# Patient Record
Sex: FEMALE | Race: WHITE | NOT HISPANIC OR LATINO | Employment: FULL TIME | URBAN - METROPOLITAN AREA
[De-identification: names, ages, dates, MRNs, and addresses within clinical notes are randomized per-mention and may not be internally consistent; named-entity substitution may affect disease eponyms.]

---

## 2017-05-12 ENCOUNTER — TRANSFERRED RECORDS (OUTPATIENT)
Dept: HEALTH INFORMATION MANAGEMENT | Facility: CLINIC | Age: 62
End: 2017-05-12

## 2017-05-30 ENCOUNTER — TRANSFERRED RECORDS (OUTPATIENT)
Dept: HEALTH INFORMATION MANAGEMENT | Facility: CLINIC | Age: 62
End: 2017-05-30

## 2017-11-20 NOTE — TELEPHONE ENCOUNTER
APPT INFO    Date /Time:  12/15/17 1:30PM   Reason for Appt: Migraines   Ref Provider/Clinic: None listed    Are there internal records? If yes, list: none   Patient Contact (Y/N) & Call Details: No- referral received   Action: Faxed request      OUTSIDE RECORDS CHECKLIST     CLINIC NAME COMMENTS REC (x) IMG (x)   [facility] Fax# [phone]

## 2017-12-14 NOTE — TELEPHONE ENCOUNTER
Phone Call:    Who did you talk to? (or) Who did you call? [facility]    Call Detail/Action: [facility] medical records directs me to their copy service, they do not have my fax.      ACTION    What did you do? Re-faxed request      Phone Call:    Who did you talk to? (or) Who did you call? [facility] [place]    Call Detail/Action: She will fax over records ASAP

## 2017-12-15 ENCOUNTER — PRE VISIT (OUTPATIENT)
Dept: NEUROLOGY | Facility: CLINIC | Age: 62
End: 2017-12-15

## 2017-12-15 ENCOUNTER — OFFICE VISIT (OUTPATIENT)
Dept: NEUROLOGY | Facility: CLINIC | Age: 62
End: 2017-12-15
Payer: COMMERCIAL

## 2017-12-15 VITALS
SYSTOLIC BLOOD PRESSURE: 146 MMHG | HEIGHT: 64 IN | HEART RATE: 74 BPM | WEIGHT: 236.9 LBS | BODY MASS INDEX: 40.45 KG/M2 | DIASTOLIC BLOOD PRESSURE: 72 MMHG

## 2017-12-15 DIAGNOSIS — G43.109 MIGRAINE WITH AURA AND WITHOUT STATUS MIGRAINOSUS, NOT INTRACTABLE: Primary | ICD-10-CM

## 2017-12-15 RX ORDER — AMOXICILLIN 500 MG
2400 CAPSULE ORAL DAILY
COMMUNITY

## 2017-12-15 RX ORDER — PROPRANOLOL HCL 60 MG
60 CAPSULE, EXTENDED RELEASE 24HR ORAL DAILY
Qty: 30 CAPSULE | Refills: 1 | Status: SHIPPED | OUTPATIENT
Start: 2017-12-15 | End: 2018-02-12

## 2017-12-15 RX ORDER — BUTALBITAL, ASPIRIN, AND CAFFEINE 325; 50; 40 MG/1; MG/1; MG/1
1 CAPSULE ORAL EVERY 4 HOURS PRN
COMMUNITY
End: 2018-04-06

## 2017-12-15 ASSESSMENT — PAIN SCALES - GENERAL: PAINLEVEL: MODERATE PAIN (5)

## 2017-12-15 NOTE — MR AVS SNAPSHOT
After Visit Summary   12/15/2017    Nasreen Galarza    MRN: [id number]           Patient Information     Date Of Birth          1955        Visit Information        Provider Department      12/15/2017 1:30 PM Ubaldo Combs MD [facility]        Today's Diagnoses     Migraine with aura and without status migrainosus, not intractable    -  1      Care Instructions    Try 2 naproxen (Aleve) instead of Fiorinal          Follow-ups after your visit        Follow-up notes from your care team     Discussed this visit Return in about 7 weeks (around 2/2/2018).      Your next 10 appointments already scheduled     Feb 09, 2018 10:00 AM CST   (Arrive by 9:45 AM)   Return Visit with Ubaldo Combs MD   [facility] ([facility])    [address] [place] [id number] [phone]              Who to contact     Please call your clinic at [phone] to:    Ask questions about your health    Make or cancel appointments    Discuss your medicines    Learn about your test results    Speak to your doctor   If you have compliments or concerns about an experience at your clinic, or if you wish to file a complaint, please contact [facility] Patient Relations at [phone] or email us at [email or web address]         Additional Information About Your Visit        MyChart Information     [employer] is an electronic gateway that provides easy, online access to your medical records. With [employer], you can request a clinic appointment, read your test results, renew a prescription or communicate with your care team.     To sign up for [employer] visit the website at [email or web address]   You will be asked to enter the access code listed below, as well as some personal information. Please follow the directions to create your username and password.     Your access code is: [email or web address]  Expires: 3/1/2018  6:30 AM     Your  "access code will  in 90 days. If you need help or a new code, please contact your [facility] or call [phone] for assistance.        Care EveryWhere ID     This is your Care EveryWhere ID. This could be used by other organizations to access your [place] medical records  [id number]        Your Vitals Were     Pulse Height BMI (Body Mass Index)             74 1.626 m (5' 4\") 40.66 kg/m2          Blood Pressure from Last 3 Encounters:   12/15/17 146/72    Weight from Last 3 Encounters:   12/15/17 107.5 kg (236 lb 14.4 oz)              Today, you had the following     No orders found for display         Today's Medication Changes          These changes are accurate as of: 12/15/17  1:35 PM.  If you have any questions, ask your nurse or doctor.               Start taking these medicines.        Dose/Directions    propranolol 60 MG 24 hr capsule   Commonly known as:  INDERAL LA   Used for:  Migraine with aura and without status migrainosus, not intractable   Started by:  Ubaldo Combs MD        Dose:  60 mg   Take 1 capsule (60 mg) by mouth daily   Quantity:  30 capsule   Refills:  1            Where to get your medicines      These medications were sent to [facility]pharm[facility] #[id number] - [place], [place] - [address]  [address], [place] [address]     Phone:  [phone]     propranolol 60 MG 24 hr capsule                Primary Care Provider    None Specified       No primary provider on file.        Equal Access to Services     [facility]UDIA [facility] : Dary Montilla, waaxda luqadaha, qaybta kaalmada selena snyder . So [facility] [phone].    ATENCIÓN: Si habla español, tiene a abad disposición servicios gratuitos de asistencia lingüística. Llame al [phone].    We comply with applicable federal civil rights laws and [place] laws. We do not discriminate on the basis of race, color, national origin, age, disability, sex, " sexual orientation, or gender identity.            Thank you!     Thank you for choosing [facility]UR[facility]  for your care. Our goal is always to provide you with excellent care. Hearing back from our patients is one way we can continue to improve our services. Please take a few minutes to complete the written survey that you may receive in the mail after your visit with us. Thank you!             Your Updated Medication List - Protect others around you: Learn how to safely use, store and throw away your medicines at www.disposemymeds.org.          This list is accurate as of: 12/15/17  1:35 PM.  Always use your most recent med list.                   Brand Name Dispense Instructions for use Diagnosis    ATORVASTATIN CALCIUM PO      Take 80 mg by mouth daily        FIORINAL capsule   Generic drug:  butalbital-aspirin-caffeine      Take 1 capsule by mouth every 4 hours as needed for headaches        fish Oil 1200 MG capsule      Take 2,400 mg by mouth daily        magnesium 500 MG Tabs      Take 500 mg by mouth daily        MULTI COMPLETE PO           PAROXETINE HCL PO      Take 10 mg by mouth daily        propranolol 60 MG 24 hr capsule    INDERAL LA    30 capsule    Take 1 capsule (60 mg) by mouth daily    Migraine with aura and without status migrainosus, not intractable       SYNTHROID PO      Take 175 mcg by mouth daily        TOVIAZ PO      Take 8 mg by mouth daily

## 2017-12-15 NOTE — LETTER
12/15/2017       RE: Nasreen Galarza  [address]    [place] [address]     Dear Colleague,    Thank you for referring your patient, Nasreen Galarza, to the [facility] at [facility]. Please see a copy of my visit note below.    Pina Galarza is a 62-year-old female here to establish care for migraine headaches.      She has had these headaches for over 30 years.  They came on after the birth of her son.  They have remained fairly consistent over the years.      Her typical headache starts in the back of her head and then spreads forward.  They are throbbing headaches.  Her most severe headaches are associated with nausea, vomiting and photophobia.  Occasionally, she will get a visual aura that she describes as a fuzziness to her vision.  On occasion she has had some numbness on the right side of her face with the headaches.      She has relocated to [place] from [place] for her job and anticipates she will be here a year or so.      She does recall seeing a neurologist in [place] in around 2003 who prescribed Topamax.  This definitely helped her headaches but she subsequently became aware that it was producing some cognitive side effects and after about 2 years she stopped it.  She recalls trying Effexor which helped a little but was not of great benefit.  She was on Inderal in the past and she thinks it helped, but she is not certain why she discontinued it.      In terms of abortive therapy she tried Imitrex nasal spray in the past which did not help and caused her to vomit.  She may have been on Treximet in the past as well but cannot recall this for certain and does not recall what effect it had.  She has not been on any other triptans that she can recall.      Her current treatment now is with Fiorinal.  She will have about 2 severe migraine headaches a month and then some milder headaches more frequently.  She is averaging about 4 Fiorinal a week.  She  feels that the Fiorinal tends to dalton off her more severe headaches.      Her past medical history is notable for hyperlipidemia.  She had a L4-L5 diskectomy in the past with some residual left L5 sensory symptoms.  She has hot flashes.  She does not have a history of cardiovascular disease and tells me she had a negative stress test in 2011.      CURRENT MEDICATIONS:    1.  Paroxetine 10 mg for hot flashes.   2.  Levothyroxine.   3.  Atorvastatin.   4.  Toviaz.   5.  Fiorinal.   6.  Multivitamin.   7.  Magnesium 500 mg.     8.  Fish oil.      ALLERGIES:  She is allergic to penicillin and sulfa.      She is adopted and is not aware of her family history.      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  She relocated from [place] to [place] in April.  She does not smoke.  She occasionally uses alcohol.      PHYSICAL EXAMINATION:  Reveals patient's heart rate is 74.  Blood pressure 146/72.   Funduscopic examination reveals sharp disc margins.  Pupils are equal, round and react well to light.  Visual fields are intact.  Cranial nerves II-XII are intact.  Motor examination reveals normal strength.  On sensory testing, she reports decreased vibratory appreciation over the left great toe and decreased pinprick over the dorsum of the left foot.  Otherwise, her sensory examination is intact.  She has a slightly antalgic gait.  Cerebellar function is intact.  Reflexes are 1-2+ except for an absent right ankle jerk.  Plantar responses are flexor.      IMPRESSION:  Chronic migraine without and with aura.      PLAN:  I did discuss her Fiorinal use.  I pointed out that frequent use of this drug can sometimes lead to worsening and perpetuation of an underlying headache problem.  We discussed gradually trying to taper off the Fiorinal and consider trying naproxen for acute management for now in place of Fiorinal.      We discussed preventive treatments.  I  suggested we could retry Topamax with a gradual increase in the dose, although she is concerned about cognitive side effects and would not want to do that.      I suggested a tricyclic antidepressant but she declined this.      It was decided to try propranolol which she has been on in the past.  She will start on propranolol XR 60 mg a day.  I reviewed potential side effects including those related to bradycardia and hypotension.      I will be seeing her back in February.         ALBERTO HOLLAND MD             D: 12/15/2017 13:39   T: 12/15/2017 14:14   MT: MEHDI      Name:     NICOLAS WHALEY   MRN:      [id number]        Account:      [id number]   :      1955           Service Date: 12/15/2017      Document: [id number]

## 2017-12-15 NOTE — TELEPHONE ENCOUNTER
Records Received From:  [facility]     Date/Exam/Location  (specify location if different)   Office Notes: 5/12/17, 4/7/16

## 2017-12-15 NOTE — PROGRESS NOTES
Pina Galarza is a 62-year-old female here to establish care for migraine headaches.      She has had these headaches for over 30 years.  They came on after the birth of her son.  They have remained fairly consistent over the years.      Her typical headache starts in the back of her head and then spreads forward.  They are throbbing headaches.  Her most severe headaches are associated with nausea, vomiting and photophobia.  Occasionally, she will get a visual aura that she describes as a fuzziness to her vision.  On occasion she has had some numbness on the right side of her face with the headaches.      She has relocated to [place] from [place] for her job and anticipates she will be here a year or so.      She does recall seeing a neurologist in [place] in around 2003 who prescribed Topamax.  This definitely helped her headaches but she subsequently became aware that it was producing some cognitive side effects and after about 2 years she stopped it.  She recalls trying Effexor which helped a little but was not of great benefit.  She was on Inderal in the past and she thinks it helped, but she is not certain why she discontinued it.      In terms of abortive therapy she tried Imitrex nasal spray in the past which did not help and caused her to vomit.  She may have been on Treximet in the past as well but cannot recall this for certain and does not recall what effect it had.  She has not been on any other triptans that she can recall.      Her current treatment now is with Fiorinal.  She will have about 2 severe migraine headaches a month and then some milder headaches more frequently.  She is averaging about 4 Fiorinal a week.  She feels that the Fiorinal tends to dalton off her more severe headaches.      Her past medical history is notable for hyperlipidemia.  She had a L4-L5 diskectomy in the past with some residual left L5 sensory symptoms.  She has hot flashes.  She does not have a history of  cardiovascular disease and tells me she had a negative stress test in 2011.      CURRENT MEDICATIONS:    1.  Paroxetine 10 mg for hot flashes.   2.  Levothyroxine.   3.  Atorvastatin.   4.  Toviaz.   5.  Fiorinal.   6.  Multivitamin.   7.  Magnesium 500 mg.     8.  Fish oil.      ALLERGIES:  She is allergic to penicillin and sulfa.      She is adopted and is not aware of her family history.      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  She relocated from [place] to [place] in April.  She does not smoke.  She occasionally uses alcohol.      PHYSICAL EXAMINATION:  Reveals patient's heart rate is 74.  Blood pressure 146/72.   Funduscopic examination reveals sharp disc margins.  Pupils are equal, round and react well to light.  Visual fields are intact.  Cranial nerves II-XII are intact.  Motor examination reveals normal strength.  On sensory testing, she reports decreased vibratory appreciation over the left great toe and decreased pinprick over the dorsum of the left foot.  Otherwise, her sensory examination is intact.  She has a slightly antalgic gait.  Cerebellar function is intact.  Reflexes are 1-2+ except for an absent right ankle jerk.  Plantar responses are flexor.      IMPRESSION:  Chronic migraine without and with aura.      PLAN:  I did discuss her Fiorinal use.  I pointed out that frequent use of this drug can sometimes lead to worsening and perpetuation of an underlying headache problem.  We discussed gradually trying to taper off the Fiorinal and consider trying naproxen for acute management for now in place of Fiorinal.      We discussed preventive treatments.  I suggested we could retry Topamax with a gradual increase in the dose, although she is concerned about cognitive side effects and would not want to do that.      I suggested a tricyclic antidepressant but she declined this.      It was decided to try propranolol which she  has been on in the past.  She will start on propranolol XR 60 mg a day.  I reviewed potential side effects including those related to bradycardia and hypotension.      I will be seeing her back in February.         ALBERTO HOLLAND MD             D: 12/15/2017 13:39   T: 12/15/2017 14:14   MT: MEHDI      Name:     NICOLAS WHALEY   MRN:      [id number]        Account:      [id number]   :      1955           Service Date: 12/15/2017      Document: [id number]

## 2018-02-12 DIAGNOSIS — G43.109 MIGRAINE WITH AURA AND WITHOUT STATUS MIGRAINOSUS, NOT INTRACTABLE: ICD-10-CM

## 2018-02-12 RX ORDER — PROPRANOLOL HCL 60 MG
CAPSULE, EXTENDED RELEASE 24HR ORAL
Qty: 30 CAPSULE | Refills: 1 | Status: SHIPPED | OUTPATIENT
Start: 2018-02-12 | End: 2018-04-06

## 2018-02-19 ENCOUNTER — HEALTH MAINTENANCE LETTER (OUTPATIENT)
Age: 63
End: 2018-02-19

## 2018-02-22 ENCOUNTER — HOSPITAL ENCOUNTER (EMERGENCY)
Facility: CLINIC | Age: 63
Discharge: HOME | End: 2018-02-23
Attending: EMERGENCY MEDICINE | Admitting: EMERGENCY MEDICINE
Payer: COMMERCIAL

## 2018-02-22 ENCOUNTER — APPOINTMENT (OUTPATIENT)
Dept: MRI IMAGING | Facility: CLINIC | Age: 63
End: 2018-02-22
Attending: EMERGENCY MEDICINE
Payer: COMMERCIAL

## 2018-02-22 VITALS
RESPIRATION RATE: 18 BRPM | HEART RATE: 78 BPM | DIASTOLIC BLOOD PRESSURE: 104 MMHG | SYSTOLIC BLOOD PRESSURE: 150 MMHG | TEMPERATURE: 99.3 F | WEIGHT: 228.6 LBS | HEIGHT: 64 IN | OXYGEN SATURATION: 98 % | BODY MASS INDEX: 39.03 KG/M2

## 2018-02-22 DIAGNOSIS — R42 VERTIGO: ICD-10-CM

## 2018-02-22 LAB
ANION GAP SERPL CALCULATED.3IONS-SCNC: 7 MMOL/L (ref 3–14)
BASOPHILS # BLD AUTO: 0 10E9/L (ref 0–0.2)
BASOPHILS NFR BLD AUTO: 0.2 %
BUN SERPL-MCNC: 18 MG/DL (ref 7–30)
CALCIUM SERPL-MCNC: 9.1 MG/DL (ref 8.5–10.1)
CHLORIDE SERPL-SCNC: 107 MMOL/L (ref 94–109)
CO2 SERPL-SCNC: 26 MMOL/L (ref 20–32)
CREAT SERPL-MCNC: 0.79 MG/DL (ref 0.52–1.04)
DIFFERENTIAL METHOD BLD: NORMAL
EOSINOPHIL # BLD AUTO: 0.1 10E9/L (ref 0–0.7)
EOSINOPHIL NFR BLD AUTO: 1.5 %
ERYTHROCYTE [DISTWIDTH] IN BLOOD BY AUTOMATED COUNT: 13.9 % (ref 10–15)
GFR SERPL CREATININE-BSD FRML MDRD: 74 ML/MIN/1.7M2
GLUCOSE SERPL-MCNC: 104 MG/DL (ref 70–99)
HCT VFR BLD AUTO: 41.1 % (ref 35–47)
HGB BLD-MCNC: 13.5 G/DL (ref 11.7–15.7)
IMM GRANULOCYTES # BLD: 0 10E9/L (ref 0–0.4)
IMM GRANULOCYTES NFR BLD: 0.2 %
LYMPHOCYTES # BLD AUTO: 1.7 10E9/L (ref 0.8–5.3)
LYMPHOCYTES NFR BLD AUTO: 25.8 %
MCH RBC QN AUTO: 28.6 PG (ref 26.5–33)
MCHC RBC AUTO-ENTMCNC: 32.8 G/DL (ref 31.5–36.5)
MCV RBC AUTO: 87 FL (ref 78–100)
MONOCYTES # BLD AUTO: 0.5 10E9/L (ref 0–1.3)
MONOCYTES NFR BLD AUTO: 7 %
NEUTROPHILS # BLD AUTO: 4.3 10E9/L (ref 1.6–8.3)
NEUTROPHILS NFR BLD AUTO: 65.3 %
NRBC # BLD AUTO: 0 10*3/UL
NRBC BLD AUTO-RTO: 0 /100
PLATELET # BLD AUTO: 288 10E9/L (ref 150–450)
POTASSIUM SERPL-SCNC: 4 MMOL/L (ref 3.4–5.3)
RBC # BLD AUTO: 4.72 10E12/L (ref 3.8–5.2)
SODIUM SERPL-SCNC: 140 MMOL/L (ref 133–144)
WBC # BLD AUTO: 6.6 10E9/L (ref 4–11)

## 2018-02-22 PROCEDURE — A9585 GADOBUTROL INJECTION: HCPCS | Performed by: EMERGENCY MEDICINE

## 2018-02-22 PROCEDURE — 99285 EMERGENCY DEPT VISIT HI MDM: CPT | Mod: 25 | Performed by: EMERGENCY MEDICINE

## 2018-02-22 PROCEDURE — 80048 BASIC METABOLIC PNL TOTAL CA: CPT | Performed by: EMERGENCY MEDICINE

## 2018-02-22 PROCEDURE — 25000128 H RX IP 250 OP 636: Performed by: EMERGENCY MEDICINE

## 2018-02-22 PROCEDURE — 96361 HYDRATE IV INFUSION ADD-ON: CPT | Performed by: EMERGENCY MEDICINE

## 2018-02-22 PROCEDURE — 70544 MR ANGIOGRAPHY HEAD W/O DYE: CPT

## 2018-02-22 PROCEDURE — 85025 COMPLETE CBC W/AUTO DIFF WBC: CPT | Performed by: EMERGENCY MEDICINE

## 2018-02-22 PROCEDURE — 96360 HYDRATION IV INFUSION INIT: CPT | Performed by: EMERGENCY MEDICINE

## 2018-02-22 PROCEDURE — 99284 EMERGENCY DEPT VISIT MOD MDM: CPT | Mod: Z6 | Performed by: EMERGENCY MEDICINE

## 2018-02-22 RX ORDER — GADOBUTROL 604.72 MG/ML
10 INJECTION INTRAVENOUS ONCE
Status: COMPLETED | OUTPATIENT
Start: 2018-02-22 | End: 2018-02-22

## 2018-02-22 RX ORDER — MECLIZINE HCL 12.5 MG 12.5 MG/1
12.5 TABLET ORAL 4 TIMES DAILY PRN
Qty: 30 TABLET | Refills: 0 | Status: SHIPPED | OUTPATIENT
Start: 2018-02-22 | End: 2018-04-06

## 2018-02-22 RX ORDER — SODIUM CHLORIDE 9 MG/ML
1000 INJECTION, SOLUTION INTRAVENOUS CONTINUOUS
Status: DISCONTINUED | OUTPATIENT
Start: 2018-02-22 | End: 2018-02-23 | Stop reason: HOSPADM

## 2018-02-22 RX ADMIN — SODIUM CHLORIDE 1000 ML: 9 INJECTION, SOLUTION INTRAVENOUS at 13:15

## 2018-02-22 RX ADMIN — GADOBUTROL 10 ML: 604.72 INJECTION INTRAVENOUS at 15:28

## 2018-02-22 ASSESSMENT — ENCOUNTER SYMPTOMS
SORE THROAT: 0
HEADACHES: 1
NECK PAIN: 1
NUMBNESS: 0
NAUSEA: 1
WEAKNESS: 0
SPEECH DIFFICULTY: 0
SEIZURES: 0
DIZZINESS: 1
PHOTOPHOBIA: 1

## 2018-02-22 NOTE — ED PROVIDER NOTES
"  History     Chief Complaint   Patient presents with     Dizziness     Hypertension     Headache     HPI  Nasreen Galarza is a 62 year old female with a history of hypercholesterolemia, migraines, depression, and thyroid disease who presents for evaluation of a headache and dizziness. Patient reports on Friday, six days ago, she had a dentist appointment which last much longer than she thought it was going to, and after the appointment she had the onset of \"vertigo\" described as \"things were moving\" and she felt \"off balance\".  She feels that her dizziness worsens with any movement of her head or change in position.  Over the weekend her vertigo persisted, but she complains Monday morning, three days ago, her vertigo acutely worsened to the point where she was dizzy even when laying flat. She was seen and evaluated in a [facility] last night for her vertigo at which time patient was told she was hypertensive with a blood pressure of 199/90. She was surprised by this as she has no prior history of hypertension. This morning, she became increasingly concerned when she had the onset of a left-sided headache with associated neck pain. She does report a history of migraines, but states her current headache is more localized behind her left ear compared to typical migraines which are more diffuse. She has mild associated nausea, and notes her vertigo did worsen when her headache started this morning. She reports if she moves too quickly she will fall over, and yesterday did use a cane while at work. No double vision, though notes it is somewhat difficult for her to focus her vision. No head trauma or recent injury. She denies ear pain, sore throat, or tinnitus. No increased photophobia compared to baseline. No numbness, weakness, or focal deficits. No history of vertigo.      I have reviewed the Medications, Allergies, Past Medical and Surgical History, and Social History in the [employer] system.  Past Medical History: " "  Diagnosis Date     Depressive disorder      Hypercholesterolemia      Migraines      Thyroid disease        Past Surgical History:   Procedure Laterality Date     ARTHROPLASTY HIP       CHOLECYSTECTOMY       GASTRIC RESTRICTIVE PROCEDURE, OPEN, REMOVE/REPLACE SUBCUTANEOUS PORT       ORTHOPEDIC SURGERY      L4-L5 diskectomy '91       No family history on file.    Social History   Substance Use Topics     Smoking status: Former Smoker     Smokeless tobacco: Never Used     Alcohol use Yes      Comment: occ       No current facility-administered medications for this encounter.      Current Outpatient Prescriptions   Medication     meclizine (ANTIVERT) 12.5 MG tablet     PAROXETINE HCL PO     Levothyroxine Sodium (SYNTHROID PO)     ATORVASTATIN CALCIUM PO     Fesoterodine Fumarate (TOVIAZ PO)     Multiple Vitamins-Minerals (MULTI COMPLETE PO)     magnesium 500 MG TABS     Omega-3 Fatty Acids (FISH OIL) 1200 MG capsule     propranolol (INDERAL LA) 60 MG 24 hr capsule     butalbital-aspirin-caffeine (FIORINAL) capsule        Allergies   Allergen Reactions     Penicillins Anaphylaxis     [place] [Sulfur] Rash       Review of Systems   Constitutional: Negative for fever.   HENT: Negative for sore throat and tinnitus.    Eyes: Positive for photophobia (chronic, unchanged). Negative for visual disturbance.   Respiratory: Negative for shortness of breath.    Cardiovascular: Negative for chest pain.   Gastrointestinal: Positive for nausea. Negative for abdominal pain.   Musculoskeletal: Positive for gait problem (\"off balance\") and neck pain (left-sided).   Neurological: Positive for dizziness and headaches (left temporal). Negative for seizures, speech difficulty, weakness and numbness.   All other systems reviewed and are negative.      Physical Exam   BP: 176/89  Pulse: 78  Temp: 99.3  F (37.4  C)  Resp: 18  Height: 162.6 cm (5' 4\")  Weight: 103.7 kg (228 lb 9.6 oz)  SpO2: 98 %      Physical Exam   Constitutional: She is " oriented to person, place, and time. No distress.   HENT:   Head: Atraumatic.   Right Ear: Tympanic membrane and ear canal normal.   Left Ear: Tympanic membrane and ear canal normal.   Mouth/Throat: Oropharynx is clear and moist. No oropharyngeal exudate.   Eyes: Pupils are equal, round, and reactive to light. No scleral icterus.   Neck: Neck supple.       Cardiovascular: Normal heart sounds and intact distal pulses.    Pulmonary/Chest: Breath sounds normal. No respiratory distress.   Abdominal: Soft. Bowel sounds are normal. There is no tenderness.   Musculoskeletal: She exhibits no edema or tenderness.   Neurological: She is alert and oriented to person, place, and time. She has normal strength. No cranial nerve deficit or sensory deficit. GCS eye subscore is 4. GCS verbal subscore is 5. GCS motor subscore is 6.   1 beat of nystagmus bilaterally.  No rotatory or vertical nystagmus.  Normal HINTS exam   Skin: Skin is warm. No rash noted. She is not diaphoretic.   Nursing note and vitals reviewed.      ED Course     ED Course     Procedures       12:51 PM  The patient was seen and examined by Dr. Linda in Room 14.          Critical Care time:  none             Labs Ordered and Resulted from Time of ED Arrival Up to the Time of Departure from the ED   BASIC METABOLIC PANEL - Abnormal; Notable for the following:        Result Value    Glucose 104 (*)     All other components within normal limits   CBC WITH PLATELETS DIFFERENTIAL   PERIPHERAL IV CATHETER            Assessments & Plan (with Medical Decision Making)   The patient has a normal neurologic exam however has worsening vertigo after a prolonged position with her head fell backwards in a dental chair.  This could put her at risk for posterior circulation compromise.  Given her age, high cholesterol and persistent symptoms I spoke with the stroke fellow on-call who agreed with the plan to do a stroke MRI.  The MRI was normal.  Although she was hypertensive  on arrival her blood pressure improved as her symptoms calmed.  Her hypertension is likely a response to her upsetting vertigo symptoms.  She will be treated with Antivert and follow-up with the balance center.  The patient agrees to return if she feels at all worse.  Her headache appears to be more consistent with muscle strain and spasm and not an cranial process.    I have reviewed the nursing notes.    I have reviewed the findings, diagnosis, plan and need for follow up with the patient.    Discharge Medication List as of 2/22/2018  4:43 PM      START taking these medications    Details   meclizine (ANTIVERT) 12.5 MG tablet Take 1 tablet (12.5 mg) by mouth 4 times daily as needed for dizziness, Disp-30 tablet, R-0, Local Print             Final diagnoses:   Vertigo   IJudy, am serving as a trained medical scribe to document services personally performed by Aamir Linda MD, based on the provider's statements to me.   Aamir PHELPS MD, was physically present and have reviewed and verified the accuracy of this note documented by Judy Kincaid.      2/22/2018   [facility], [place], EMERGENCY DEPARTMENT     Alden Lidna MD  02/23/18 1260

## 2018-02-22 NOTE — ED AVS SNAPSHOT
[facility], [place], Emergency Department    [address] [place] [address]    Phone:  [phone]                                       Nasreen Galarza   MRN: [id number]    Department:  [facility], Emergency Department   Date of Visit:  2/22/2018           After Visit Summary Signature Page     I have received my discharge instructions, and my questions have been answered. I have discussed any challenges I see with this plan with the nurse or doctor.    ..........................................................................................................................................  Patient/Patient Representative Signature      ..........................................................................................................................................  Patient Representative Print Name and Relationship to Patient    ..................................................               ................................................  Date                                            Time    ..........................................................................................................................................  Reviewed by Signature/Title    ...................................................              ..............................................  Date                                                            Time

## 2018-02-22 NOTE — ED NOTES
Pt presents with headache, dizziness and high BP. She states she has been having vertigo since Fri. She went to a [facility] for eval and they found her BP to be elevated 190s/90s. Pt does not have hx of HTN. She does have hx of migraine headaches. Denies vision changes, no N/V.

## 2018-02-22 NOTE — DISCHARGE INSTRUCTIONS
Please make an appointment to follow up with [facility] (phone: [phone]) and Neurology Clinic (phone: [phone]) as soon as possible if not improving.           *BENIGN POSITIONAL VERTIGO         The inner ear is located behind the middle ear. It is a part of the balance center of the body. It contains small calcium particles within fluid filled canals (semi-circular canals). These particles can move out of position as a result of aging, head trauma or disease of the inner ear. Once that happens, movement of the head into certain positions may cause the particles to stimulate the inner ear and create the feeling of vertigo.  Vertigo is a false feeling of motion (as if you or the room is spinning). A vertigo attack may cause sudden nausea, vomiting and heavy sweating. Severe vertigo causes a loss of balance and may result in falling. During an attack of vertigo, head movement and body position changes will worsen symptoms.  An episode of vertigo may last seconds, minutes or hours. Once you are over the first episode of vertigo, it may never return. Sometimes symptoms recur off and on over several weeks or longer.  HOME CARE:    If symptoms are severe, rest quietly in bed. Change positions slowly. There is usually one position that will feel best, such as lying on one side or lying on your back with your head slightly raised on pillows.    Do not drive or work with dangerous machinery for one week after symptoms disappear, in case of a sudden return of symptoms.    Take medicine as prescribed to relieve your symptoms. Unless another medicine was prescribed for nausea, vomiting and vertigo, you may use over-the-counter motion sickness pills, such as meclizine (Bonine, Bonamine, Antivert) or dimenhydrinate (Dramamine).  FOLLOW UP with your doctor or as directed by our staff. Report any persistent ringing in the ear or hearing loss to your doctor.  [NOTE: If you had a CT or MRI scan, it will be reviewed by  a specialist. You will be notified of any new findings that may affect your care.]  GET PROMPT MEDICAL ATTENTION if any of the following occur:    Worsening of vertigo not controlled by the medicine prescribed    Repeated vomiting not controlled by the medicine prescribed    Increased weakness or fainting    Severe headache or unusual drowsiness or confusion    Weakness of an arm or leg or one side of the face    Difficulty with speech or vision    Seizure    0226-6662 [employer], [address] [place] [address], [place] [address]. All rights reserved. This information is not intended as a substitute for professional medical care. Always follow your healthcare professional's instructions.

## 2018-02-22 NOTE — ED AVS SNAPSHOT
[facility], Emergency Department    [address] [facility] [address]    Phone:  [phone]                                       Nasreen Galarza   MRN: [id number]    Department:  [facility], [place], Emergency Department   Date of Visit:  2/22/2018           Patient Information     Date Of Birth          1955        Your diagnoses for this visit were:     Vertigo        You were seen by Alden Linda MD.        Discharge Instructions       Please make an appointment to follow up with [facility] (phone: [phone]) and Neurology Clinic (phone: [phone]) as soon as possible if not improving.           *BENIGN POSITIONAL VERTIGO         The inner ear is located behind the middle ear. It is a part of the balance center of the body. It contains small calcium particles within fluid filled canals (semi-circular canals). These particles can move out of position as a result of aging, head trauma or disease of the inner ear. Once that happens, movement of the head into certain positions may cause the particles to stimulate the inner ear and create the feeling of vertigo.  Vertigo is a false feeling of motion (as if you or the room is spinning). A vertigo attack may cause sudden nausea, vomiting and heavy sweating. Severe vertigo causes a loss of balance and may result in falling. During an attack of vertigo, head movement and body position changes will worsen symptoms.  An episode of vertigo may last seconds, minutes or hours. Once you are over the first episode of vertigo, it may never return. Sometimes symptoms recur off and on over several weeks or longer.  HOME CARE:    If symptoms are severe, rest quietly in bed. Change positions slowly. There is usually one position that will feel best, such as lying on one side or lying on your back with your head slightly raised on pillows.    Do not drive or work with dangerous machinery for one week after symptoms disappear, in case of a sudden return  of symptoms.    Take medicine as prescribed to relieve your symptoms. Unless another medicine was prescribed for nausea, vomiting and vertigo, you may use over-the-counter motion sickness pills, such as meclizine (Bonine, Bonamine, Antivert) or dimenhydrinate (Dramamine).  FOLLOW UP with your doctor or as directed by our staff. Report any persistent ringing in the ear or hearing loss to your doctor.  [NOTE: If you had a CT or MRI scan, it will be reviewed by a specialist. You will be notified of any new findings that may affect your care.]  GET PROMPT MEDICAL ATTENTION if any of the following occur:    Worsening of vertigo not controlled by the medicine prescribed    Repeated vomiting not controlled by the medicine prescribed    Increased weakness or fainting    Severe headache or unusual drowsiness or confusion    Weakness of an arm or leg or one side of the face    Difficulty with speech or vision    Seizure    6600-0122 [employer], [address] [place] [address]. All rights reserved. This information is not intended as a substitute for professional medical care. Always follow your healthcare professional's instructions.        Future Appointments        Provider Department Dept Phone Center    4/6/2018 3:00 PM Ubaldo Combs MD [facility] Neurology [phone] [facility]      24 Hour Appointment Hotline       To make an appointment at any [facility], call [phone]). If you don't have a family doctor or clinic, we will help you find one. [facility] clinics are conveniently located to serve the needs of you and your family.             Review of your medicines      START taking        Dose / Directions Last dose taken    meclizine 12.5 MG tablet   Commonly known as:  ANTIVERT   Dose:  12.5 mg   Quantity:  30 tablet        Take 1 tablet (12.5 mg) by mouth 4 times daily as needed for dizziness   Refills:  0          Our records show that you are taking the medicines listed  below. If these are incorrect, please call your family doctor or clinic.        Dose / Directions Last dose taken    ATORVASTATIN CALCIUM PO   Dose:  80 mg        Take 80 mg by mouth daily   Refills:  0        FIORINAL capsule   Dose:  1 capsule   Generic drug:  butalbital-aspirin-caffeine        Take 1 capsule by mouth every 4 hours as needed for headaches   Refills:  0        fish Oil 1200 MG capsule   Dose:  2400 mg        Take 2,400 mg by mouth daily   Refills:  0        magnesium 500 MG Tabs   Dose:  500 mg        Take 500 mg by mouth daily   Refills:  0        MULTI COMPLETE PO        Refills:  0        PAROXETINE HCL PO   Dose:  10 mg        Take 10 mg by mouth daily   Refills:  0        propranolol 60 MG 24 hr capsule   Commonly known as:  INDERAL LA   Quantity:  30 capsule        TAKE 1 CAPSULE (60 MG) BY MOUTH DAILY   Refills:  1        SYNTHROID PO   Dose:  175 mcg        Take 175 mcg by mouth daily   Refills:  0        TOVIAZ PO   Dose:  8 mg        Take 8 mg by mouth daily   Refills:  0                Prescriptions were sent or printed at these locations (1 Prescription)                   Other Prescriptions                Printed at Department/Unit printer (1 of 1)         meclizine (ANTIVERT) 12.5 MG tablet                Procedures and tests performed during your visit     Basic metabolic panel    CBC with platelets differential    MR Brain for Stroke Complete (UU,UA,SH)    Peripheral IV catheter      Orders Needing Specimen Collection     None      Pending Results     Date and Time Order Name Status Description    2/22/2018 1307 MR Brain for Stroke Complete (UU,UA,SH) Preliminary             Pending Culture Results     No orders found from 2/20/2018 to 2/23/2018.            Pending Results Instructions     If you had any lab results that were not finalized at the time of your Discharge, you can call the ED Lab Result RN at [phone]. You will be contacted by this team for any positive Lab results  or changes in treatment. The nurses are available 7 days a week from 10A to 6:30P.  You can leave a message 24 hours per day and they will return your call.        Thank you for choosing [facility]       Thank you for choosing [facility] for your care. Our goal is always to provide you with excellent care. Hearing back from our patients is one way we can continue to improve our services. Please take a few minutes to complete the written survey that you may receive in the mail after you visit with us. Thank you!        [employer] Information     [employer] gives you secure access to your electronic health record. If you see a primary care provider, you can also send messages to your care team and make appointments. If you have questions, please call your primary care clinic.  If you do not have a primary care provider, please call [phone] and they will assist you.        Care EveryWhere ID     This is your Care EveryWhere ID. This could be used by other organizations to access your [facility] medical records  [id number]        Equal Access to Services     TISHA MARQUEZ AH: Hadii wagner Montilla, sandi paul, keshawn snyder, selena juarez . So [facility] [phone].    ATENCIÓN: Si habla español, tiene a abad disposición servicios gratuitos de asistencia lingüística. Llame al [phone].    We comply with applicable federal civil rights laws and [place] laws. We do not discriminate on the basis of race, color, national origin, age, disability, sex, sexual orientation, or gender identity.            After Visit Summary       This is your record. Keep this with you and show to your community pharmacist(s) and doctor(s) at your next visit.

## 2018-02-23 ASSESSMENT — ENCOUNTER SYMPTOMS
FEVER: 0
SHORTNESS OF BREATH: 0
ABDOMINAL PAIN: 0

## 2018-03-23 ASSESSMENT — ANXIETY QUESTIONNAIRES
2. NOT BEING ABLE TO STOP OR CONTROL WORRYING: NOT AT ALL
1. FEELING NERVOUS, ANXIOUS, OR ON EDGE: SEVERAL DAYS
GAD7 TOTAL SCORE: 1
7. FEELING AFRAID AS IF SOMETHING AWFUL MIGHT HAPPEN: NOT AT ALL
4. TROUBLE RELAXING: NOT AT ALL
6. BECOMING EASILY ANNOYED OR IRRITABLE: NOT AT ALL
3. WORRYING TOO MUCH ABOUT DIFFERENT THINGS: NOT AT ALL
5. BEING SO RESTLESS THAT IT IS HARD TO SIT STILL: NOT AT ALL

## 2018-04-03 ASSESSMENT — ENCOUNTER SYMPTOMS
MYALGIAS: 1
DIZZINESS: 1
JOINT SWELLING: 0
MUSCLE WEAKNESS: 0
LOSS OF CONSCIOUSNESS: 0
STIFFNESS: 0
POLYPHAGIA: 0
STIFFNESS: 0
NIGHT SWEATS: 0
INCREASED ENERGY: 0
DECREASED APPETITE: 0
WEAKNESS: 0
DOUBLE VISION: 0
POLYDIPSIA: 0
EYE WATERING: 0
HEADACHES: 1
SPEECH CHANGE: 0
BACK PAIN: 1
HEADACHES: 1
WEIGHT GAIN: 0
ARTHRALGIAS: 0
TREMORS: 0
NECK PAIN: 0
FATIGUE: 0
WEIGHT LOSS: 1
LOSS OF CONSCIOUSNESS: 0
EYE REDNESS: 1
MEMORY LOSS: 0
DIZZINESS: 1
SEIZURES: 0
MUSCLE WEAKNESS: 0
WEAKNESS: 0
TINGLING: 0
SPEECH CHANGE: 0
NUMBNESS: 1
TINGLING: 0
ALTERED TEMPERATURE REGULATION: 0
NUMBNESS: 0
SEIZURES: 0
CHILLS: 0
MYALGIAS: 1
MUSCLE CRAMPS: 0
TREMORS: 0
FEVER: 0
EYE PAIN: 0
MUSCLE CRAMPS: 1
MEMORY LOSS: 1
JOINT SWELLING: 0
HALLUCINATIONS: 0
BACK PAIN: 1
ARTHRALGIAS: 1
PARALYSIS: 0
NECK PAIN: 1
EYE IRRITATION: 1
PARALYSIS: 0
DISTURBANCES IN COORDINATION: 0

## 2018-04-03 ASSESSMENT — ANXIETY QUESTIONNAIRES
6. BECOMING EASILY ANNOYED OR IRRITABLE: SEVERAL DAYS
4. TROUBLE RELAXING: NOT AT ALL
3. WORRYING TOO MUCH ABOUT DIFFERENT THINGS: NOT AT ALL
7. FEELING AFRAID AS IF SOMETHING AWFUL MIGHT HAPPEN: NOT AT ALL
2. NOT BEING ABLE TO STOP OR CONTROL WORRYING: NOT AT ALL
1. FEELING NERVOUS, ANXIOUS, OR ON EDGE: SEVERAL DAYS
7. FEELING AFRAID AS IF SOMETHING AWFUL MIGHT HAPPEN: NOT AT ALL
GAD7 TOTAL SCORE: 2
GAD7 TOTAL SCORE: 2
5. BEING SO RESTLESS THAT IT IS HARD TO SIT STILL: NOT AT ALL

## 2018-04-04 ASSESSMENT — ANXIETY QUESTIONNAIRES: GAD7 TOTAL SCORE: 2

## 2018-04-06 ENCOUNTER — OFFICE VISIT (OUTPATIENT)
Dept: NEUROLOGY | Facility: CLINIC | Age: 63
End: 2018-04-06
Payer: COMMERCIAL

## 2018-04-06 VITALS
HEIGHT: 64 IN | WEIGHT: 229.5 LBS | RESPIRATION RATE: 24 BRPM | HEART RATE: 92 BPM | DIASTOLIC BLOOD PRESSURE: 79 MMHG | BODY MASS INDEX: 39.18 KG/M2 | TEMPERATURE: 98.2 F | SYSTOLIC BLOOD PRESSURE: 130 MMHG | OXYGEN SATURATION: 93 %

## 2018-04-06 DIAGNOSIS — G43.109 MIGRAINE WITH AURA AND WITHOUT STATUS MIGRAINOSUS, NOT INTRACTABLE: Primary | ICD-10-CM

## 2018-04-06 RX ORDER — RIZATRIPTAN BENZOATE 5 MG/1
5-10 TABLET ORAL
Qty: 18 TABLET | Refills: 1 | Status: SHIPPED | OUTPATIENT
Start: 2018-04-06 | End: 2020-08-31

## 2018-04-06 ASSESSMENT — PAIN SCALES - GENERAL: PAINLEVEL: NO PAIN (0)

## 2018-04-06 NOTE — LETTER
4/6/2018       RE: Nasreen Galarza  [address]    [facility] [address]     Dear Colleague,    Thank you for referring your patient, Nasreen Galarza, to the [facility] NEUROLOGY at [facility]. Please see a copy of my visit note below.    Service Date: 04/06/2018      INTERVAL HISTORY:   Nasreen Galarza returns for scheduled followup.  She is a patient who has migraine headaches.  She has had these for over 30 years.  Generally her headaches are without aura but occasionally she has had a visual aura or facial numbness with her headaches.      She tells me she has actually done well since I saw her back in December.  She has only had 2 headaches this past month.  She decided to stop propranolol because it made her sleepy  and she is not on any preventive agent.  She has not used any Fiorinal.  She will just use ibuprofen or an ice pack when she gets a headache.      She previously had tried 2 sumatriptan products (Imitrex nasal spray and Treximet) and did not find them effective.      She was in the emergency room on 02/22/2018 with vertigo.  She had a brain MRI scan that fortunately did not reveal any acute intracranial abnormalities such as a stroke.  I did review the images.  There were a few nonspecific small T2 signal changes in the white matter that could represent small vessel degeneration but also can be seen in migraine.  Neck and intracranial MR angiograms were normal.  She tells me her vertigo has improved.      CURRENT MEDICATIONS:   1.  Paroxetine.     2.  Levothyroxine.     3.  Atorvastatin.     4.  Toviaz.     5.  Multivitamin.     6.  Magnesium.     7.  Omega 3 fish oil.      PHYSICAL EXAMINATION:   GENERAL:   She is alert and in good spirits.     VITAL SIGNS:   Heart rate 92.  Blood pressure 130/79.     NEUROLOGIC:   Funduscopic examination is unremarkable.  Visual fields are intact.  She has full extraocular motility without nystagmus.  Speech is clear.   Motor examination reveals intact strength.  There is no pronator drift.  Sensory examination reveals no deficit.  Finger-to-nose is done well.  Gait is normal.  Reflexes are 1-2+ and symmetric.      IMPRESSION:   1.  Migraine.   2.  Vertigo -- largely resolved.      PLAN:  We discussed trying another preventive agent, but she is comfortable with how she is doing now and is having infrequent headaches.      I discussed trying a different triptan medication for her acute migraines and she would be interested in this.  She is going to try rizatriptan at a dose of 5-10 mg p.r.n.  I reviewed side effects.  I told her if she develops any chest pain she should not use the drug.  She, however, does not have any known history of cardiovascular disease.      She will let me know how she does with the rizatriptan.  She does have Fiorinal that she can utilize if the rizatriptan is ineffective or poorly tolerated.  As long as she uses Fiorinal infrequently I think it is acceptable in her case.      I plan to see her back in 6 months.      Ubaldo Combs MD         D: 2018   T: 2018   MT: AKA      Name:     NICOLAS WHALEY   MRN:      [id number]        Account:      [id number]   :      1955           Service Date: 2018      Document: [id number]

## 2018-04-06 NOTE — PROGRESS NOTES
Service Date: 04/06/2018      INTERVAL HISTORY:   Nasreen Galarza returns for scheduled followup.  She is a patient who has migraine headaches.  She has had these for over 30 years.  Generally her headaches are without aura but occasionally she has had a visual aura or facial numbness with her headaches.      She tells me she has actually done well since I saw her back in December.  She has only had 2 headaches this past month.  She decided to stop propranolol because it made her sleepy  and she is not on any preventive agent.  She has not used any Fiorinal.  She will just use ibuprofen or an ice pack when she gets a headache.      She previously had tried 2 sumatriptan products (Imitrex nasal spray and Treximet) and did not find them effective.      She was in the emergency room on 02/22/2018 with vertigo.  She had a brain MRI scan that fortunately did not reveal any acute intracranial abnormalities such as a stroke.  I did review the images.  There were a few nonspecific small T2 signal changes in the white matter that could represent small vessel degeneration but also can be seen in migraine.  Neck and intracranial MR angiograms were normal.  She tells me her vertigo has improved.      CURRENT MEDICATIONS:   1.  Paroxetine.     2.  Levothyroxine.     3.  Atorvastatin.     4.  Toviaz.     5.  Multivitamin.     6.  Magnesium.     7.  Omega 3 fish oil.      PHYSICAL EXAMINATION:   GENERAL:   She is alert and in good spirits.     VITAL SIGNS:   Heart rate 92.  Blood pressure 130/79.     NEUROLOGIC:   Funduscopic examination is unremarkable.  Visual fields are intact.  She has full extraocular motility without nystagmus.  Speech is clear.  Motor examination reveals intact strength.  There is no pronator drift.  Sensory examination reveals no deficit.  Finger-to-nose is done well.  Gait is normal.  Reflexes are 1-2+ and symmetric.      IMPRESSION:   1.  Migraine.   2.  Vertigo -- largely resolved.      PLAN:  We discussed  trying another preventive agent, but she is comfortable with how she is doing now and is having infrequent headaches.      I discussed trying a different triptan medication for her acute migraines and she would be interested in this.  She is going to try rizatriptan at a dose of 5-10 mg p.r.n.  I reviewed side effects.  I told her if she develops any chest pain she should not use the drug.  She, however, does not have any known history of cardiovascular disease.      She will let me know how she does with the rizatriptan.  She does have Fiorinal that she can utilize if the rizatriptan is ineffective or poorly tolerated.  As long as she uses Fiorinal infrequently I think it is acceptable in her case.      I plan to see her back in 6 months.      MD ALBERTO Cao MD             D: 2018   T: 2018   MT: AKA      Name:     NICOLAS WHALEY   MRN:      [id number]        Account:      [id number]   :      1955           Service Date: 2018      Document: [id number]

## 2018-04-06 NOTE — MR AVS SNAPSHOT
After Visit Summary   4/6/2018    Nasreen Galarza    MRN: [id number]           Patient Information     Date Of Birth          1955        Visit Information        Provider Department      4/6/2018 3:00 PM Ubaldo Combs MD [facility] Neurology        Today's Diagnoses     Migraine with aura and without status migrainosus, not intractable    -  1       Follow-ups after your visit        Follow-up notes from your care team     Discussed this visit Return in about 6 months (around 10/6/2018).      Your next 10 appointments already scheduled     Apr 16, 2018  9:30 AM CDT   New Patient Visit with Gabriela Castañeda MD PhD   [facility] ([facility])    [place] [facility]  [address]  [address]  [address] [address]   [phone]              Who to contact     Please call your clinic at [phone] to:    Ask questions about your health    Make or cancel appointments    Discuss your medicines    Learn about your test results    Speak to your doctor            Additional Information About Your Visit        [employer] Information     [employer] gives you secure access to your electronic health record. If you see a primary care provider, you can also send messages to your care team and make appointments. If you have questions, please call your primary care clinic.  If you do not have a primary care provider, please call [phone] and they will assist you.      [employer] is an electronic gateway that provides easy, online access to your medical records. With [employer], you can request a clinic appointment, read your test results, renew a prescription or communicate with your care team.     To access your existing account, please contact your [facility] or call [phone] for assistance.        Care EveryWhere ID     This is your Care EveryWhere ID. This could be used by other organizations to access your [facility]  "records  [id number]        Your Vitals Were     Pulse Temperature Respirations Height Pulse Oximetry Breastfeeding?    92 98.2  F (36.8  C) (Oral) 24 1.626 m (5' 4\") 93% No    BMI (Body Mass Index)                   39.39 kg/m2            Blood Pressure from Last 3 Encounters:   04/06/18 130/79   02/22/18 (!) 150/104   12/15/17 146/72    Weight from Last 3 Encounters:   04/06/18 104.1 kg (229 lb 8 oz)   02/22/18 103.7 kg (228 lb 9.6 oz)   12/15/17 107.5 kg (236 lb 14.4 oz)              Today, you had the following     No orders found for display         Today's Medication Changes          These changes are accurate as of 4/6/18  3:23 PM.  If you have any questions, ask your nurse or doctor.               Start taking these medicines.        Dose/Directions    rizatriptan 5 MG tablet   Commonly known as:  MAXALT   Used for:  Migraine with aura and without status migrainosus, not intractable   Started by:  Ubaldo Combs MD        Dose:  5-10 mg   Take 1-2 tablets (5-10 mg) by mouth at onset of headache for migraine May repeat in 2 hours. Max 6 tablets/24 hours.   Quantity:  18 tablet   Refills:  1            Where to get your medicines      These medications were sent to [facility]/pharmacy #[id number] - [place], [place] - [address] [place] [address]  [address], [place] [address]     Phone:  [phone]     rizatriptan 5 MG tablet                Primary Care Provider Fax #    Physician No Ref-Primary [phone]       No address on file        Equal Access to Services     [facility] : Hadii wagner shaikh Socecy, waaxda luqadaha, qaybta kaalmada adeegemily, selena george. So [facility] [phone].    ATENCIÓN: Si habla español, tiene a abad disposición servicios gratuitos de asistencia lingüística. Llame al [phone].    We comply with applicable federal civil rights laws and [place] laws. We do not discriminate on the basis of race, color, national origin, age, disability, sex, sexual " orientation, or gender identity.            Thank you!     Thank you for choosing [facility] NEUROLOGY  for your care. Our goal is always to provide you with excellent care. Hearing back from our patients is one way we can continue to improve our services. Please take a few minutes to complete the written survey that you may receive in the mail after your visit with us. Thank you!             Your Updated Medication List - Protect others around you: Learn how to safely use, store and throw away your medicines at www.disposemymeds.org.          This list is accurate as of 4/6/18  3:23 PM.  Always use your most recent med list.                   Brand Name Dispense Instructions for use Diagnosis    ATORVASTATIN CALCIUM PO      Take 80 mg by mouth daily        fish Oil 1200 MG capsule      Take 2,400 mg by mouth daily        magnesium 500 MG Tabs      Take 500 mg by mouth daily        MULTI COMPLETE PO           PAROXETINE HCL PO      Take 10 mg by mouth daily        rizatriptan 5 MG tablet    MAXALT    18 tablet    Take 1-2 tablets (5-10 mg) by mouth at onset of headache for migraine May repeat in 2 hours. Max 6 tablets/24 hours.    Migraine with aura and without status migrainosus, not intractable       SYNTHROID PO      Take 175 mcg by mouth daily        TOVIAZ PO      Take 8 mg by mouth daily

## 2018-04-16 ENCOUNTER — OFFICE VISIT (OUTPATIENT)
Dept: FAMILY MEDICINE | Facility: CLINIC | Age: 63
End: 2018-04-16
Attending: FAMILY MEDICINE
Payer: COMMERCIAL

## 2018-04-16 VITALS
SYSTOLIC BLOOD PRESSURE: 121 MMHG | DIASTOLIC BLOOD PRESSURE: 77 MMHG | HEIGHT: 64 IN | WEIGHT: 230 LBS | HEART RATE: 101 BPM | BODY MASS INDEX: 39.27 KG/M2

## 2018-04-16 DIAGNOSIS — Z00.00 ROUTINE GENERAL MEDICAL EXAMINATION AT A HEALTH CARE FACILITY: Primary | ICD-10-CM

## 2018-04-16 DIAGNOSIS — E78.5 HYPERLIPIDEMIA LDL GOAL <100: ICD-10-CM

## 2018-04-16 DIAGNOSIS — E03.4 HYPOTHYROIDISM DUE TO ACQUIRED ATROPHY OF THYROID: ICD-10-CM

## 2018-04-16 PROCEDURE — 87624 HPV HI-RISK TYP POOLED RSLT: CPT | Performed by: FAMILY MEDICINE

## 2018-04-16 PROCEDURE — G0145 SCR C/V CYTO,THINLAYER,RESCR: HCPCS | Performed by: FAMILY MEDICINE

## 2018-04-16 PROCEDURE — G0463 HOSPITAL OUTPT CLINIC VISIT: HCPCS | Mod: ZF

## 2018-04-16 ASSESSMENT — PAIN SCALES - GENERAL: PAINLEVEL: NO PAIN (0)

## 2018-04-16 NOTE — LETTER
4/16/2018       RE: Nasreen Galarza  [address]    [facility] [address]     Dear Colleague,    Thank you for referring your patient, Nasreen Galarza, to the [facility] at [facility]. Please see a copy of my visit note below.              Primary Care Center  New Patient Evaluation    Name: Nasreen Galarza MRN: [id number]       Age: 62 year old           Chief Complaint:    YOB: 1955       CC:annual and establish care            HPI and ROS:   HPI:    62 year old female with a PMH of  hypothyroidism, migraines, and hypercholesterolemia and wants to establish care and wants a physical today.  She is PM Gr3P10 021. Last pap was 2 yrs  and HPV negative. No vaginal bleeding, no burning or itching. Not sexually active. Has hx of abnormal pap about 10-12 yrs ago and had cryo - all f/u has been normal     Last mammogram 2017   DEXA was >5 yrs ago and was normal  Calcium: yogurt, milk daily - multivit only; no vit D  Exercise: minimal   Colonoscopy - 10 yrs ago           ROS:             Past Medical History:     Past Medical History:   Diagnosis Date     Hypercholesterolemia      Migraines      Thyroid disease              Past Surgical History:      Past Surgical History:   Procedure Laterality Date     ABDOMEN SURGERY  2008    Lap band     ARTHROPLASTY HIP  2011    L hip replacement     CHOLECYSTECTOMY  1996     ORTHOPEDIC SURGERY  1991    L4-L5 diskectomy '91             Social History:     Social History     Social History     Marital status: Single     Spouse name: N/A     Number of children: N/A     Years of education: N/A     Occupational History     Not on file.     Social History Main Topics     Smoking status: Former Smoker     Packs/day: 2.00     Years: 18.00     Types: Cigarettes     Start date: 1/1/1970     Quit date: 10/18/1988     Smokeless tobacco: Never Used     Alcohol use Yes      Comment: occ     Drug use: No     Sexual  "activity: Not Currently     Partners: Male     Birth control/ protection: None     Other Topics Concern     Parent/Sibling W/ Cabg, Mi Or Angioplasty Before 65f 55m? No     Social History Narrative                Family History:     Family History   Problem Relation Age of Onset     Unknown/Adopted No family hx of                  Immunizations:     Immunization History   Administered Date(s) Administered     Pneumococcal 23 valent 04/16/2007     TDAP Vaccine (Boostrix) 12/23/2011     Zoster vaccine, live 04/07/2016             Allergies:     Allergies   Allergen Reactions     Penicillins Anaphylaxis     Sulfa Drugs Rash     [place] [Sulfur] Rash             Medications:     Current Outpatient Prescriptions on File Prior to Visit:  rizatriptan (MAXALT) 5 MG tablet Take 1-2 tablets (5-10 mg) by mouth at onset of headache for migraine May repeat in 2 hours. Max 6 tablets/24 hours.   PAROXETINE HCL PO Take 10 mg by mouth daily   Levothyroxine Sodium (SYNTHROID PO) Take 175 mcg by mouth daily   ATORVASTATIN CALCIUM PO Take 80 mg by mouth daily   Fesoterodine Fumarate (TOVIAZ PO) Take 8 mg by mouth daily   Multiple Vitamins-Minerals (MULTI COMPLETE PO)    magnesium 500 MG TABS Take 500 mg by mouth daily   Omega-3 Fatty Acids (FISH OIL) 1200 MG capsule Take 2,400 mg by mouth daily     No current facility-administered medications on file prior to visit.          Exam:   /77  Pulse 101  Ht 1.626 m (5' 4\")  Wt 104.3 kg (230 lb)  BMI 39.48 kg/m2    General: NAD, alert and conversational  HEENT PEERL; ears clear, OP MMM and upper bridge,   Neck supple and no palpable thyroid, no bruits  CV: Normal S1,S2 with RRR no murmurs, rubs or gallops  Resp: Lungs CTA bilaterally with no wheezes or crackles  Abd: Soft, NTND with no organomegaly or masses  Pelvic Exam:  Vulva: No external lesions, decreased hair distribution, no adenopathy  Vagina: pale and dry, no abnormal discharge, not rugated, no lesions  Cervix: Pap smear is " taken, small speckled appearance to cervix, pale pap done    Uterus: small size, anteverted, non-tender, mobile  Ovaries: No mass, non-tender,   Rectal exam: No mass, non-tender, normal sphincter tone  MSK: no joint or bony abnormalities, normal muscle bulk  Neuro: Grossly nonfocal, CNII-XII intact bilaterally  Skin: No concerning lesions or rashes        Labs:   pending         Assessment and Plan:     Assessment: Postmenopausal older female comes in to establish care has a history of hypothyroidism, hypercholesterolemia and migraines and wants a physical    Plan  1.  Hypothyroidism.  She remains on supplement we will check a TSH  2.  Hypercholesterolemia.  She is currently on a statin will check a lipid when fasting  3.  Migraines.  She recently received a prescription for Maxalt and has not yet tried it.  4.  Hot flashes.  She remains on Paxil and this seems to help.  5.  Urinary urgency.  She currently is taking fesoterodine fumarate and this works well.  6.  Healthcare maintenance.  Immunizations up-to-date.  Encouraged her to get the new zoster vaccine at [facility].  Discussed calcium and vitamin D.  Would like her to schedule a DEXA.  Blood work as above.  Encouraged her to start weightbearing exercise and think about weight loss.  It has been 10 years so her colonoscopy is due.  The cervix had a speckled pattern therefore decided to get a Pap with HPV.Mammogram ordered       Again, thank you for allowing me to participate in the care of your patient.      Sincerely,    Gabriela Castañeda MD PhD

## 2018-04-16 NOTE — PROGRESS NOTES
Primary Care Center  New Patient Evaluation    Name: Nasreen Galarza MRN: [id number]       Age: 62 year old           Chief Complaint:    YOB: 1955       CC:annual and establish care            HPI and ROS:   HPI:    62 year old female with a PMH of  hypothyroidism, migraines, and hypercholesterolemia and wants to establish care and wants a physical today.  She is PM Gr3P10 021. Last pap was 2 yrs  and HPV negative. No vaginal bleeding, no burning or itching. Not sexually active. Has hx of abnormal pap about 10-12 yrs ago and had cryo - all f/u has been normal     Last mammogram 2017   DEXA was >5 yrs ago and was normal  Calcium: yogurt, milk daily - multivit only; no vit D  Exercise: minimal   Colonoscopy - 10 yrs ago           ROS:  Answers for HPI/ROS submitted by the patient on 4/3/2018   PHQ-2 Score: 0  Skin Symptoms: No  HENT Symptoms: No  EYE SYMPTOMS: Yes  HEART SYMPTOMS: No  LUNG SYMPTOMS: No  INTESTINAL SYMPTOMS: No  URINARY SYMPTOMS: No  GYNECOLOGIC SYMPTOMS: No  BREAST SYMPTOMS: No  SKELETAL SYMPTOMS: Yes  BLOOD SYMPTOMS: No  NERVOUS SYSTEM SYMPTOMS: Yes  MENTAL HEALTH SYMPTOMS: No  Eye pain: No  Vision loss: No  Dry eyes: No  Watery eyes: No  Eye bulging: No  Double vision: No  Flashing of lights: No  Spots: No  Floaters: No  Redness: Yes  Tunnel Vision: No  Yellowing of eyes: No  Eye irritation: Yes  Back pain: Yes  Muscle aches: Yes  Neck pain: Yes  Swollen joints: No  Joint pain: No  Bone pain: No  Muscle cramps: No  Muscle weakness: No  Joint stiffness: No  Bone fracture: No  Trouble with coordination: No  Dizziness or trouble with balance: Yes  Fainting or black-out spells: No  Memory loss: No  Headache: Yes  Seizures: No  Speech problems: No  Tingling: No  Tremor: No  Weakness: No  Difficulty walking: No  Paralysis: No  Numbness: No             Past Medical History:     Past Medical History:   Diagnosis Date     Hypercholesterolemia      Migraines      Thyroid disease               Past Surgical History:      Past Surgical History:   Procedure Laterality Date     ABDOMEN SURGERY  2008    Lap band     ARTHROPLASTY HIP  2011    L hip replacement     CHOLECYSTECTOMY  1996     ORTHOPEDIC SURGERY  1991    L4-L5 diskectomy '91             Social History:     Social History     Social History     Marital status: Single     Spouse name: N/A     Number of children: N/A     Years of education: N/A     Occupational History     Not on file.     Social History Main Topics     Smoking status: Former Smoker     Packs/day: 2.00     Years: 18.00     Types: Cigarettes     Start date: 1/1/1970     Quit date: 10/18/1988     Smokeless tobacco: Never Used     Alcohol use Yes      Comment: occ     Drug use: No     Sexual activity: Not Currently     Partners: Male     Birth control/ protection: None     Other Topics Concern     Parent/Sibling W/ Cabg, Mi Or Angioplasty Before 65f 55m? No     Social History Narrative                Family History:     Family History   Problem Relation Age of Onset     Unknown/Adopted No family hx of                  Immunizations:     Immunization History   Administered Date(s) Administered     Pneumococcal 23 valent 04/16/2007     TDAP Vaccine (Boostrix) 12/23/2011     Zoster vaccine, live 04/07/2016             Allergies:     Allergies   Allergen Reactions     Penicillins Anaphylaxis     Sulfa Drugs Rash     [place] [Sulfur] Rash             Medications:     Current Outpatient Prescriptions on File Prior to Visit:  rizatriptan (MAXALT) 5 MG tablet Take 1-2 tablets (5-10 mg) by mouth at onset of headache for migraine May repeat in 2 hours. Max 6 tablets/24 hours.   PAROXETINE HCL PO Take 10 mg by mouth daily   Levothyroxine Sodium (SYNTHROID PO) Take 175 mcg by mouth daily   ATORVASTATIN CALCIUM PO Take 80 mg by mouth daily   Fesoterodine Fumarate (TOVIAZ PO) Take 8 mg by mouth daily   Multiple Vitamins-Minerals (MULTI COMPLETE PO)    magnesium 500 MG TABS Take 500 mg by mouth  "daily   Omega-3 Fatty Acids (FISH OIL) 1200 MG capsule Take 2,400 mg by mouth daily     No current facility-administered medications on file prior to visit.          Exam:   /77  Pulse 101  Ht 1.626 m (5' 4\")  Wt 104.3 kg (230 lb)  BMI 39.48 kg/m2    General: NAD, alert and conversational  HEENT PEERL; ears clear, OP MMM and upper bridge,   Neck supple and no palpable thyroid, no bruits  CV: Normal S1,S2 with RRR no murmurs, rubs or gallops  Resp: Lungs CTA bilaterally with no wheezes or crackles  Abd: Soft, NTND with no organomegaly or masses  Pelvic Exam:  Vulva: No external lesions, decreased hair distribution, no adenopathy  Vagina: pale and dry, no abnormal discharge, not rugated, no lesions  Cervix: Pap smear is taken, small speckled appearance to cervix, pale pap done    Uterus: small size, anteverted, non-tender, mobile  Ovaries: No mass, non-tender,   Rectal exam: No mass, non-tender, normal sphincter tone  MSK: no joint or bony abnormalities, normal muscle bulk  Neuro: Grossly nonfocal, CNII-XII intact bilaterally  Skin: No concerning lesions or rashes        Labs:   pending         Assessment and Plan:     Assessment: Postmenopausal older female comes in to establish care has a history of hypothyroidism, hypercholesterolemia and migraines and wants a physical    Plan  1.  Hypothyroidism.  She remains on supplement we will check a TSH  2.  Hypercholesterolemia.  She is currently on a statin will check a lipid when fasting  3.  Migraines.  She recently received a prescription for Maxalt and has not yet tried it.  4.  Hot flashes.  She remains on Paxil and this seems to help.  5.  Urinary urgency.  She currently is taking fesoterodine fumarate and this works well.  6.  Healthcare maintenance.  Immunizations up-to-date.  Encouraged her to get the new zoster vaccine at [facility].  Discussed calcium and vitamin D.  Would like her to schedule a DEXA.  Blood work as above.  Encouraged her to start " weightbearing exercise and think about weight loss.  It has been 10 years so her colonoscopy is due.  The cervix had a speckled pattern therefore decided to get a Pap with HPV.Mammogram ordered     Gabriela Castañeda MD, PhD

## 2018-04-16 NOTE — MR AVS SNAPSHOT
After Visit Summary   4/16/2018    Nasreen Galarza    MRN: [id number]           Patient Information     Date Of Birth          1955        Visit Information        Provider Department      4/16/2018 9:30 AM Gabriela Castañeda MD PhD [facility]        Today's Diagnoses     Routine general medical examination at a health care facility    -  1    Hyperlipidemia LDL goal <100        Hypothyroidism due to acquired atrophy of thyroid          Care Instructions    Get new zoster vaccine at [facility]  Patient should take 1200mg of calcium/day in divided doses diet and food together  and vitamin D3 1000IU/day.  Schedule a DXA -  - schedule at the [facility]  Blood work when fasting   Find a  and start weight bearing exercise - goal is 30 min 5x/wk  Sign up for my chart here      Nutritious diet  Eat 1200 mg calcium total every day.  Many people achieve this by a diet containing calcium (milk, yogurt, cheese, and other dairy products, supplemented food) and 1 calcium pill. If you don't eat dairy, you should take a calcium supplement 2 times a day.  Eat 1000 IU of vitamin D on daily basis.    Eat a variety of fruits and vegetables and eat whole grains.  Try to choose foods with a high NuVal score, if your grocery store shows this number. High numbers, like 80 or 90, are nutritious foods, and low scores, like 10 are less nutritious foods.          Men should drink no more than 2 alcoholic beverages daily on average; women should drink no more than 1 alcoholic beverage daily on average.    Everyone should avoid all tobacco and nicotine-containing products.    Exercise: Aim for:  Moderate activity (where you can still talk while doing it, such as walking about 100 steps per minute, or 3,000 steps in 30 minutes) for 30 minutes at least 5 days a week  Or  Vigorous exercise (you are working so hard you are breathing hard and fast and your heart rate has gone up, such as playing  basketball or soccer) at least 75 minutes weekly    If you have trouble doing 30 minutes of activity, all at once, try small bursts of activity. Go to www.abefitness.com for suggestions on how you can do this at home or work.     All adults should try to do muscle strengthening exercise at least 2 days a week    Safety  Always wear bike/motorcycle helmet and seatbelt in a vehicle  Make sure your home has smoke and carbon monoxide detectors.  Wear broad spectrum sunscreen of at least SPF 15 on sun-exposed skin.    Preventing disease  See your dentist at least once a year  Have your eyes checked every 1-2 years.  Get a flu shot each year.              Follow-ups after your visit        Additional Services     GASTROENTEROLOGY ADULT REF PROCEDURE ONLY [facility] [phone]       Last Lab Result: Creatinine (mg/dL)       Date                     Value                 02/22/2018               0.79             ----------  Body mass index is 39.48 kg/(m^2).     Needed:  No  Language:  English    Patient will be contacted to schedule procedure.     Please be aware that coverage of these services is subject to the terms and limitations of your health insurance plan.  Call member services at your health plan with any benefit or coverage questions.  Any procedures must be performed at a [place] facility OR coordinated by your clinic's referral office.    Please bring the following with you to your appointment:    (1) Any X-Rays, CTs or MRIs which have been performed.  Contact the facility where they were done to arrange for  prior to your scheduled appointment.    (2) List of current medications   (3) This referral request   (4) Any documents/labs given to you for this referral                  Follow-up notes from your care team     Return in about 1 year (around 4/16/2019).      Future tests that were ordered for you today     Open Future Orders        Priority Expected Expires Ordered     "Mammogram Screening Bilateral Routine  4/16/2019 4/16/2018    TSH - Reflex to FT4 Routine  4/16/2019 4/16/2018    Lipid Panel Routine  4/16/2019 4/16/2018            Who to contact     Please call your clinic at [phone] to:    Ask questions about your health    Make or cancel appointments    Discuss your medicines    Learn about your test results    Speak to your doctor            Additional Information About Your Visit        [employer] Information     [employer] gives you secure access to your electronic health record. If you see a primary care provider, you can also send messages to your care team and make appointments. If you have questions, please call your primary care clinic.  If you do not have a primary care provider, please call [phone] and they will assist you.      [employer] is an electronic gateway that provides easy, online access to your medical records. With [employer], you can request a clinic appointment, read your test results, renew a prescription or communicate with your care team.     To access your existing account, please contact your [facility] or call [phone] for assistance.        Care EveryWhere ID     This is your Care EveryWhere ID. This could be used by other organizations to access your [place] medical records  [id number]        Your Vitals Were     Pulse Height BMI (Body Mass Index)             101 1.626 m (5' 4\") 39.48 kg/m2          Blood Pressure from Last 3 Encounters:   04/16/18 121/77   04/06/18 130/79   02/22/18 (!) 150/104    Weight from Last 3 Encounters:   04/16/18 104.3 kg (230 lb)   04/06/18 104.1 kg (229 lb 8 oz)   02/22/18 103.7 kg (228 lb 9.6 oz)              We Performed the Following     Basic metabolic panel     GASTROENTEROLOGY ADULT REF PROCEDURE ONLY [facility] [phone]        Primary Care Provider Fax #    Physician No Ref-Primary [phone]       No address on file        Equal Access to Services     TISHA " GAAR : Hadii aad ku neel [place], waaxda luqadaha, qaybta kayonda lillian, selena ciro amaliafrancesco faria beckytyrone juarez . So [facility] [phone].    ATENCIÓN: Si habla español, tiene a abad disposición servicios gratuitos de asistencia lingüística. Llame al [phone].    We comply with applicable federal civil rights laws and [place] laws. We do not discriminate on the basis of race, color, national origin, age, disability, sex, sexual orientation, or gender identity.            Thank you!     Thank you for choosing [facility]  for your care. Our goal is always to provide you with excellent care. Hearing back from our patients is one way we can continue to improve our services. Please take a few minutes to complete the written survey that you may receive in the mail after your visit with us. Thank you!             Your Updated Medication List - Protect others around you: Learn how to safely use, store and throw away your medicines at www.disposemymeds.org.          This list is accurate as of 4/16/18 10:23 AM.  Always use your most recent med list.                   Brand Name Dispense Instructions for use Diagnosis    ATORVASTATIN CALCIUM PO      Take 80 mg by mouth daily        fish Oil 1200 MG capsule      Take 2,400 mg by mouth daily        magnesium 500 MG Tabs      Take 500 mg by mouth daily        MULTI COMPLETE PO           PAROXETINE HCL PO      Take 10 mg by mouth daily        rizatriptan 5 MG tablet    MAXALT    18 tablet    Take 1-2 tablets (5-10 mg) by mouth at onset of headache for migraine May repeat in 2 hours. Max 6 tablets/24 hours.    Migraine with aura and without status migrainosus, not intractable       SYNTHROID PO      Take 175 mcg by mouth daily        TOVIAZ PO      Take 8 mg by mouth daily

## 2018-04-16 NOTE — PATIENT INSTRUCTIONS
Get new zoster vaccine at [facility]  Patient should take 1200mg of calcium/day in divided doses diet and food together  and vitamin D3 1000IU/day.  Schedule a DXA -  - schedule at the [facility]  Blood work when fasting   Find a  and start weight bearing exercise - goal is 30 min 5x/wk  Sign up for my chart here      Nutritious diet  Eat 1200 mg calcium total every day.  Many people achieve this by a diet containing calcium (milk, yogurt, cheese, and other dairy products, supplemented food) and 1 calcium pill. If you don't eat dairy, you should take a calcium supplement 2 times a day.  Eat 1000 IU of vitamin D on daily basis.    Eat a variety of fruits and vegetables and eat whole grains.  Try to choose foods with a high NuVal score, if your grocery store shows this number. High numbers, like 80 or 90, are nutritious foods, and low scores, like 10 are less nutritious foods.          Men should drink no more than 2 alcoholic beverages daily on average; women should drink no more than 1 alcoholic beverage daily on average.    Everyone should avoid all tobacco and nicotine-containing products.    Exercise: Aim for:  Moderate activity (where you can still talk while doing it, such as walking about 100 steps per minute, or 3,000 steps in 30 minutes) for 30 minutes at least 5 days a week  Or  Vigorous exercise (you are working so hard you are breathing hard and fast and your heart rate has gone up, such as playing basketball or soccer) at least 75 minutes weekly    If you have trouble doing 30 minutes of activity, all at once, try small bursts of activity. Go to www.[employer] for suggestions on how you can do this at home or work.     All adults should try to do muscle strengthening exercise at least 2 days a week    Safety  Always wear bike/motorcycle helmet and seatbelt in a vehicle  Make sure your home has smoke and carbon monoxide detectors.  Wear broad spectrum sunscreen of at least SPF 15 on sun-exposed  skin.    Preventing disease  See your dentist at least once a year  Have your eyes checked every 1-2 years.  Get a flu shot each year.

## 2018-04-18 LAB
COPATH REPORT: NORMAL
PAP: NORMAL

## 2018-04-20 LAB
FINAL DIAGNOSIS: NORMAL
HPV HR 12 DNA CVX QL NAA+PROBE: NEGATIVE
HPV16 DNA SPEC QL NAA+PROBE: NEGATIVE
HPV18 DNA SPEC QL NAA+PROBE: NEGATIVE
SPECIMEN DESCRIPTION: NORMAL
SPECIMEN SOURCE CVX/VAG CYTO: NORMAL

## 2018-04-24 ENCOUNTER — TELEPHONE (OUTPATIENT)
Dept: GASTROENTEROLOGY | Facility: CLINIC | Age: 63
End: 2018-04-24

## 2018-04-25 ENCOUNTER — TELEPHONE (OUTPATIENT)
Dept: GASTROENTEROLOGY | Facility: CLINIC | Age: 63
End: 2018-04-25

## 2018-04-26 ENCOUNTER — TELEPHONE (OUTPATIENT)
Dept: GASTROENTEROLOGY | Facility: OUTPATIENT CENTER | Age: 63
End: 2018-04-26

## 2018-04-26 NOTE — TELEPHONE ENCOUNTER
Patient taking any blood thinners ? No    Heart disease ? denies    Lung disease ?denies      Sleep apnea ?denies    Diabetic ?denies    Kidney disease ?denies    Dialysis ? n/a    Electronic implanted medical devices ?denies    Are you taking any narcotic pain medication ? no  What is your daily dosage ?    PTSD ? n/a    Prep instructions reviewed with patient ? Patient declined review, has had exam before.  policy, MAC sedation plan reviewed. Advised patient to have someone stay with her post exam    Pharmacy : n/a    Indication for procedure : Screening colonoscopy    Referring provider :Gabriela Castañeda MD PhD      Arrival Time : Patient will arrive at 2 PM

## 2018-04-30 ENCOUNTER — TRANSFERRED RECORDS (OUTPATIENT)
Dept: HEALTH INFORMATION MANAGEMENT | Facility: CLINIC | Age: 63
End: 2018-04-30

## 2018-04-30 ENCOUNTER — DOCUMENTATION ONLY (OUTPATIENT)
Dept: GASTROENTEROLOGY | Facility: OUTPATIENT CENTER | Age: 63
End: 2018-04-30
Payer: COMMERCIAL

## 2018-05-02 LAB — COPATH REPORT: NORMAL

## 2018-05-29 DIAGNOSIS — E78.5 HYPERLIPIDEMIA LDL GOAL <100: ICD-10-CM

## 2018-05-29 DIAGNOSIS — E03.4 HYPOTHYROIDISM DUE TO ACQUIRED ATROPHY OF THYROID: ICD-10-CM

## 2018-05-29 DIAGNOSIS — Z00.00 ROUTINE GENERAL MEDICAL EXAMINATION AT A HEALTH CARE FACILITY: ICD-10-CM

## 2018-05-29 LAB
ANION GAP SERPL CALCULATED.3IONS-SCNC: 10 MMOL/L (ref 3–14)
BUN SERPL-MCNC: 16 MG/DL (ref 7–30)
CALCIUM SERPL-MCNC: 9.7 MG/DL (ref 8.5–10.1)
CHLORIDE SERPL-SCNC: 105 MMOL/L (ref 94–109)
CHOLEST SERPL-MCNC: 169 MG/DL
CO2 SERPL-SCNC: 26 MMOL/L (ref 20–32)
CREAT SERPL-MCNC: 0.84 MG/DL (ref 0.52–1.04)
GFR SERPL CREATININE-BSD FRML MDRD: 69 ML/MIN/1.7M2
GLUCOSE SERPL-MCNC: 113 MG/DL (ref 70–99)
HDLC SERPL-MCNC: 49 MG/DL
LDLC SERPL CALC-MCNC: 100 MG/DL
NONHDLC SERPL-MCNC: 120 MG/DL
POTASSIUM SERPL-SCNC: 4.6 MMOL/L (ref 3.4–5.3)
SODIUM SERPL-SCNC: 141 MMOL/L (ref 133–144)
T4 FREE SERPL-MCNC: 1.43 NG/DL (ref 0.76–1.46)
TRIGL SERPL-MCNC: 100 MG/DL
TSH SERPL DL<=0.005 MIU/L-ACNC: 0.08 MU/L (ref 0.4–4)

## 2018-05-29 PROCEDURE — 84439 ASSAY OF FREE THYROXINE: CPT | Performed by: FAMILY MEDICINE

## 2018-05-29 PROCEDURE — 84443 ASSAY THYROID STIM HORMONE: CPT | Performed by: FAMILY MEDICINE

## 2018-05-29 PROCEDURE — 80061 LIPID PANEL: CPT | Performed by: FAMILY MEDICINE

## 2018-05-29 PROCEDURE — 80048 BASIC METABOLIC PNL TOTAL CA: CPT | Performed by: FAMILY MEDICINE

## 2018-05-29 PROCEDURE — 36415 COLL VENOUS BLD VENIPUNCTURE: CPT | Performed by: FAMILY MEDICINE

## 2018-06-05 ENCOUNTER — MYC MEDICAL ADVICE (OUTPATIENT)
Dept: FAMILY MEDICINE | Facility: CLINIC | Age: 63
End: 2018-06-05

## 2018-06-05 DIAGNOSIS — Z13.820 SCREENING FOR OSTEOPOROSIS: Primary | ICD-10-CM

## 2018-06-07 DIAGNOSIS — Z13.820 SCREENING FOR OSTEOPOROSIS: Primary | ICD-10-CM

## 2018-06-11 NOTE — TELEPHONE ENCOUNTER
Pt. Pharmacy requesting clarificaton on atorvastatin order. Order sent today would only give 11 day supply per fill. Gave verbal authorization to adjust to 80mg tabs, dispense 90 w/ 3 refills

## 2018-06-16 ENCOUNTER — MYC MEDICAL ADVICE (OUTPATIENT)
Dept: FAMILY MEDICINE | Facility: CLINIC | Age: 63
End: 2018-06-16

## 2018-06-16 DIAGNOSIS — R39.15 URINARY URGENCY: Primary | ICD-10-CM

## 2018-06-18 ENCOUNTER — RADIANT APPOINTMENT (OUTPATIENT)
Dept: BONE DENSITY | Facility: CLINIC | Age: 63
End: 2018-06-18
Attending: FAMILY MEDICINE
Payer: COMMERCIAL

## 2018-06-18 DIAGNOSIS — Z00.00 ROUTINE GENERAL MEDICAL EXAMINATION AT A HEALTH CARE FACILITY: ICD-10-CM

## 2018-06-18 DIAGNOSIS — Z13.820 SCREENING FOR OSTEOPOROSIS: ICD-10-CM

## 2018-06-18 DIAGNOSIS — Z12.31 VISIT FOR SCREENING MAMMOGRAM: ICD-10-CM

## 2018-06-18 PROCEDURE — 77067 SCR MAMMO BI INCL CAD: CPT | Mod: TC | Performed by: OBSTETRICS & GYNECOLOGY

## 2018-06-18 PROCEDURE — 77063 BREAST TOMOSYNTHESIS BI: CPT | Mod: TC | Performed by: OBSTETRICS & GYNECOLOGY

## 2018-06-18 PROCEDURE — 77080 DXA BONE DENSITY AXIAL: CPT | Mod: TC | Performed by: OBSTETRICS & GYNECOLOGY

## 2018-06-21 ENCOUNTER — TELEPHONE (OUTPATIENT)
Dept: OBGYN | Facility: CLINIC | Age: 63
End: 2018-06-21

## 2018-06-21 DIAGNOSIS — R39.15 URINARY URGENCY: ICD-10-CM

## 2018-06-21 RX ORDER — FESOTERODINE FUMARATE 8 MG/1
8 TABLET, FILM COATED, EXTENDED RELEASE ORAL DAILY
Qty: 90 TABLET | Refills: 3 | Status: SHIPPED | OUTPATIENT
Start: 2018-06-21 | End: 2020-02-07

## 2018-06-21 RX ORDER — FESOTERODINE FUMARATE 8 MG/1
8 TABLET, FILM COATED, EXTENDED RELEASE ORAL DAILY
Qty: 90 TABLET | Refills: 3 | Status: SHIPPED | OUTPATIENT
Start: 2018-06-21 | End: 2018-06-21

## 2018-06-21 NOTE — TELEPHONE ENCOUNTER
Re- prescribed      Disp Refills Start End ALFRED   Fesoterodine Fumarate (TOVIAZ) 8 MG TB24           so able to print hard copy since Nasreen sends to [facility] d/t cost and had Kathleen Sandra NP approve it  Left vm for Nasreen available for her to  at [facility]

## 2018-06-21 NOTE — TELEPHONE ENCOUNTER
----- Message from Merlene Cui sent at 6/21/2018  1:03 PM CDT -----  Regarding: For Lois/pt call back  Contact: [phone]  Needs hard copy of RX (not electronic).  She fills it in [facility] for $89/90 days vs. $800/month here.

## 2018-06-21 NOTE — TELEPHONE ENCOUNTER
Received refill request for toviaz.  Last in clinic 4/2018 to establish care with Dr Castañeda. Dr Castañeda's note states 5.  Urinary urgency.  She currently is taking fesoterodine fumarate and this works well.  Refill e-prescribed to  [facility]. Left vm for Nasreen with above poc

## 2018-08-23 ENCOUNTER — OFFICE VISIT (OUTPATIENT)
Dept: FAMILY MEDICINE | Facility: CLINIC | Age: 63
End: 2018-08-23
Payer: COMMERCIAL

## 2018-08-23 VITALS
DIASTOLIC BLOOD PRESSURE: 67 MMHG | BODY MASS INDEX: 39.15 KG/M2 | HEART RATE: 70 BPM | TEMPERATURE: 98.2 F | SYSTOLIC BLOOD PRESSURE: 134 MMHG | OXYGEN SATURATION: 96 % | WEIGHT: 229.3 LBS | HEIGHT: 64 IN

## 2018-08-23 DIAGNOSIS — E66.09 CLASS 2 OBESITY DUE TO EXCESS CALORIES WITHOUT SERIOUS COMORBIDITY WITH BODY MASS INDEX (BMI) OF 39.0 TO 39.9 IN ADULT: ICD-10-CM

## 2018-08-23 DIAGNOSIS — N95.1 MENOPAUSAL SYNDROME (HOT FLASHES): ICD-10-CM

## 2018-08-23 DIAGNOSIS — Z00.00 ROUTINE GENERAL MEDICAL EXAMINATION AT A HEALTH CARE FACILITY: Primary | ICD-10-CM

## 2018-08-23 DIAGNOSIS — Z71.89 ADVANCED CARE PLANNING/COUNSELING DISCUSSION: ICD-10-CM

## 2018-08-23 DIAGNOSIS — E03.9 HYPOTHYROIDISM, UNSPECIFIED TYPE: ICD-10-CM

## 2018-08-23 DIAGNOSIS — E66.812 CLASS 2 OBESITY DUE TO EXCESS CALORIES WITHOUT SERIOUS COMORBIDITY WITH BODY MASS INDEX (BMI) OF 39.0 TO 39.9 IN ADULT: ICD-10-CM

## 2018-08-23 DIAGNOSIS — Z11.4 SCREENING FOR HIV (HUMAN IMMUNODEFICIENCY VIRUS): ICD-10-CM

## 2018-08-23 DIAGNOSIS — Z11.59 NEED FOR HEPATITIS C SCREENING TEST: ICD-10-CM

## 2018-08-23 PROCEDURE — 84443 ASSAY THYROID STIM HORMONE: CPT | Performed by: FAMILY MEDICINE

## 2018-08-23 PROCEDURE — 84439 ASSAY OF FREE THYROXINE: CPT | Performed by: FAMILY MEDICINE

## 2018-08-23 PROCEDURE — 99386 PREV VISIT NEW AGE 40-64: CPT | Performed by: FAMILY MEDICINE

## 2018-08-23 PROCEDURE — 87389 HIV-1 AG W/HIV-1&-2 AB AG IA: CPT | Performed by: FAMILY MEDICINE

## 2018-08-23 PROCEDURE — 36415 COLL VENOUS BLD VENIPUNCTURE: CPT | Performed by: FAMILY MEDICINE

## 2018-08-23 PROCEDURE — 86803 HEPATITIS C AB TEST: CPT | Performed by: FAMILY MEDICINE

## 2018-08-23 NOTE — PROGRESS NOTES
SUBJECTIVE:   CC: Nasreen Galarza is an 63 year old woman who presents for preventive health visit.     Physical   Annual:     Getting at least 3 servings of Calcium per day:  Yes    Bi-annual eye exam:  Yes    Dental care twice a year:  Yes    Sleep apnea or symptoms of sleep apnea:  Sleep apnea    Diet:  Regular (no restrictions)    Frequency of exercise:  1 day/week    Duration of exercise:  15-30 minutes    Taking medications regularly:  Yes    Medication side effects:  Not applicable    Additional concerns today:  YES    Patient presents to clinic for routine wellness visit.  Denies any concerns at this time.  Surgical history significant for left hip arthroplasty in 2011.  Also has bursitis on the right hip.  The patient underwent a LAP-BAND for weight loss in 2000 but had many complications of the band had to be deflated.  Continues to have the desire to lose weight.    Today's PHQ-2 Score:   PHQ-2 ( 1999 Pf[employer]) 8/23/2018   Q1: Little interest or pleasure in doing things 0   Q2: Feeling down, depressed or hopeless 0   PHQ-2 Score 0   Q1: Little interest or pleasure in doing things Not at all   Q2: Feeling down, depressed or hopeless Not at all   PHQ-2 Score 0       Abuse: Current or Past(Physical, Sexual or Emotional)- No  Do you feel safe in your environment - Yes    Social History   Substance Use Topics     Smoking status: Former Smoker     Packs/day: 2.00     Years: 18.00     Types: Cigarettes     Start date: 1/1/1970     Quit date: 10/18/1988     Smokeless tobacco: Never Used     Alcohol use Yes      Comment: occ 1x/month     Alcohol Use 8/23/2018   If you drink alcohol do you typically have greater than 3 drinks per day OR greater than 7 drinks per week? No   No flowsheet data found.    Reviewed orders with patient.  Reviewed health maintenance and updated orders accordingly - Yes  Patient over age 50, mutual decision to screen reflected in health maintenance.    Pertinent mammograms are reviewed under  "the imaging tab.  History of abnormal Pap smear: NO - age 30-65 PAP every 5 years with negative HPV co-testing recommended  PAP / HPV Latest Ref Rng & Units 4/16/2018   PAP - NIL   HPV 16 DNA NEG:Negative Negative   HPV 18 DNA NEG:Negative Negative   OTHER HR HPV NEG:Negative Negative     Reviewed and updated as needed this visit by clinical staff  Tobacco  Allergies  Meds         Reviewed and updated as needed this visit by Provider            Review of Systems  CONSTITUTIONAL: NEGATIVE for fever, chills, change in weight  INTEGUMENTARU/SKIN: NEGATIVE for worrisome rashes, moles or lesions  EYES: NEGATIVE for vision changes or irritation  ENT: NEGATIVE for ear, mouth and throat problems  RESP: NEGATIVE for significant cough or SOB  BREAST: NEGATIVE for masses, tenderness or discharge  CV: NEGATIVE for chest pain, palpitations or peripheral edema  GI: NEGATIVE for nausea, abdominal pain, heartburn, or change in bowel habits  : NEGATIVE for unusual urinary or vaginal symptoms. Periods are regular.  MUSCULOSKELETAL: NEGATIVE for significant arthralgias or myalgia  NEURO: NEGATIVE for weakness, dizziness or paresthesias  PSYCHIATRIC: NEGATIVE for changes in mood or affect     OBJECTIVE:   /67  Pulse 70  Temp 98.2  F (36.8  C) (Oral)  Ht 5' 4\" (1.626 m)  Wt 229 lb 4.8 oz (104 kg)  SpO2 96%  BMI 39.36 kg/m2  Physical Exam  GENERAL: healthy, alert and no distress  EYES: Eyes grossly normal to inspection, PERRL and conjunctivae and sclerae normal  HENT: ear canals and TM's normal, nose and mouth without ulcers or lesions  NECK: no adenopathy, no asymmetry, masses, or scars and thyroid normal to palpation  RESP: lungs clear to auscultation - no rales, rhonchi or wheezes  CV: regular rate and rhythm, normal S1 S2, no S3 or S4, no murmur, click or rub, no peripheral edema and peripheral pulses strong  ABDOMEN: soft, nontender, no hepatosplenomegaly, no masses and bowel sounds normal  MS: no gross " "musculoskeletal defects noted, no edema  SKIN: no suspicious lesions or rashes  NEURO: Normal strength and tone, mentation intact and speech normal  PSYCH: mentation appears normal, affect normal/bright    Diagnostic Test Results:  No results found for this or any previous visit (from the past 24 hour(s)).    ASSESSMENT/PLAN:       ICD-10-CM    1. Routine general medical examination at a health care facility Z00.00    2. Hypothyroidism, unspecified type E03.9 TSH with free T4 reflex   3. Need for hepatitis C screening test Z11.59 Hepatitis C antibody   4. Screening for HIV (human immunodeficiency virus) Z11.4 HIV Antigen Antibody Combo     Her levothyroxine level was recently changed to 150 mcg per day.  She is due for a repeat lab.    -[place]  Her disability sticker  and desires to renew it. She is at a risk of falling if she walks more than 200 feet. Has bursitis on the right and morbidly obese with BMI of 39.     We had a discussion regarding advanced care planning.  Healthcare directive was printed and given to patient.  Advised to drop off at the clinic.    COUNSELING:  Reviewed preventive health counseling, as reflected in patient instructions       Regular exercise       Healthy diet/nutrition    BP Readings from Last 1 Encounters:   18 134/67     Estimated body mass index is 39.36 kg/(m^2) as calculated from the following:    Height as of this encounter: 5' 4\" (1.626 m).    Weight as of this encounter: 229 lb 4.8 oz (104 kg).     reports that she quit smoking about 29 years ago. Her smoking use included Cigarettes. She started smoking about 48 years ago. She has a 36.00 pack-year smoking history. She has never used smokeless tobacco.    Counseling Resources:  ATP IV Guidelines  Pooled Cohorts Equation Calculator  Breast Cancer Risk Calculator  FRAX Risk Assessment  ICSI Preventive Guidelines  Dietary Guidelines for Americans,   Safety[employer]'s MyPlate  ASA " Prophylaxis  Lung CA Screening    Noemi Mayre MD  [facility]

## 2018-08-23 NOTE — NURSING NOTE
"Chief Complaint   Patient presents with     Physical     /67  Pulse 70  Temp 98.2  F (36.8  C) (Oral)  Ht 5' 4\" (1.626 m)  Wt 229 lb 4.8 oz (104 kg)  SpO2 96%  BMI 39.36 kg/m2 Estimated body mass index is 39.36 kg/(m^2) as calculated from the following:    Height as of this encounter: 5' 4\" (1.626 m).    Weight as of this encounter: 229 lb 4.8 oz (104 kg).  Medication Reconciliation: complete      Health Maintenance that is potentially due pending provider review:  NONE    n/a    MARQUEZ Perdue  "

## 2018-08-23 NOTE — MR AVS SNAPSHOT
After Visit Summary   8/23/2018    Nasreen Galarza    MRN: [id number]           Patient Information     Date Of Birth          1955        Visit Information        Provider Department      8/23/2018 3:40 PM Noemi Mayer MD [facility]        Today's Diagnoses     Hypothyroidism, unspecified type    -  1      Care Instructions      Preventive Health Recommendations  Female Ages 50 - 64    Yearly exam: See your health care provider every year in order to  o Review health changes.   o Discuss preventive care.    o Review your medicines if your doctor has prescribed any.      Get a Pap test every three years (unless you have an abnormal result and your provider advises testing more often).    If you get Pap tests with HPV test, you only need to test every 5 years, unless you have an abnormal result.     You do not need a Pap test if your uterus was removed (hysterectomy) and you have not had cancer.    You should be tested each year for STDs (sexually transmitted diseases) if you're at risk.     Have a mammogram every 1 to 2 years.    Have a colonoscopy at age 50, or have a yearly FIT test (stool test). These exams screen for colon cancer.      Have a cholesterol test every 5 years, or more often if advised.    Have a diabetes test (fasting glucose) every three years. If you are at risk for diabetes, you should have this test more often.     If you are at risk for osteoporosis (brittle bone disease), think about having a bone density scan (DEXA).    Shots: Get a flu shot each year. Get a tetanus shot every 10 years.    Nutrition:     Eat at least 5 servings of fruits and vegetables each day.    Eat whole-grain bread, whole-wheat pasta and brown rice instead of white grains and rice.    Get adequate Calcium and Vitamin D.     Lifestyle    Exercise at least 150 minutes a week (30 minutes a day, 5 days a week). This will help you control your weight and prevent disease.    Limit alcohol to  "one drink per day.    No smoking.     Wear sunscreen to prevent skin cancer.     See your dentist every six months for an exam and cleaning.    See your eye doctor every 1 to 2 years.            Follow-ups after your visit        Who to contact     If you have questions or need follow up information about today's clinic visit or your schedule please contact [facility] directly at [phone].  Normal or non-critical lab and imaging results will be communicated to you by Eventialshart, letter or phone within 4 business days after the clinic has received the results. If you do not hear from us within 7 days, please contact the clinic through MollyWatrt or phone. If you have a critical or abnormal lab result, we will notify you by phone as soon as possible.  Submit refill requests through eMagin or call your pharmacy and they will forward the refill request to us. Please allow 3 business days for your refill to be completed.          Additional Information About Your Visit        EventialsharBlue Egg Information     eMagin gives you secure access to your electronic health record. If you see a primary care provider, you can also send messages to your care team and make appointments. If you have questions, please call your primary care clinic.  If you do not have a primary care provider, please call [phone] and they will assist you.        Care EveryWhere ID     This is your Care EveryWhere ID. This could be used by other organizations to access your [place] medical records  [id number]        Your Vitals Were     Pulse Temperature Height Pulse Oximetry BMI (Body Mass Index)       70 98.2  F (36.8  C) (Oral) 5' 4\" (1.626 m) 96% 39.36 kg/m2        Blood Pressure from Last 3 Encounters:   08/23/18 134/67   04/16/18 121/77   04/06/18 130/79    Weight from Last 3 Encounters:   08/23/18 229 lb 4.8 oz (104 kg)   04/16/18 230 lb (104.3 kg)   04/06/18 229 lb 8 oz (104.1 kg)              We Performed the Following     Hepatitis C " antibody     HIV Antigen Antibody Combo     TSH with free T4 reflex        Primary Care Provider Office Phone # Fax #    [facility] [phone]       [facility] [address]  [facility] [address]        Equal Access to Services     TISHA MARQUEZ : Hadii aad ku hadasho Soomaali, waaxda luqadaha, qaybta kaalmada adeegyada, waxay alissonin haymaria luisafrancesco fosswilltyrone george. So [facility] [phone].    ATENCIÓN: Si habla español, tiene a abad disposición servicios gratuitos de asistencia lingüística. Rob al [phone].    We comply with applicable federal civil rights laws and [place] laws. We do not discriminate on the basis of race, color, national origin, age, disability, sex, sexual orientation, or gender identity.            Thank you!     Thank you for choosing [facility]  for your care. Our goal is always to provide you with excellent care. Hearing back from our patients is one way we can continue to improve our services. Please take a few minutes to complete the written survey that you may receive in the mail after your visit with us. Thank you!             Your Updated Medication List - Protect others around you: Learn how to safely use, store and throw away your medicines at www.disposemymeds.org.          This list is accurate as of 8/23/18  4:05 PM.  Always use your most recent med list.                   Brand Name Dispense Instructions for use Diagnosis    atorvastatin 10 MG tablet    LIPITOR    90 tablet    Take 8 tablets (80 mg) by mouth daily    Hypercholesterolemia       Fesoterodine Fumarate 8 MG Tb24    TOVIAZ    90 tablet    Take 1 tablet (8 mg) by mouth daily    Urinary urgency       fish Oil 1200 MG capsule      Take 2,400 mg by mouth daily        levothyroxine 150 MCG tablet    SYNTHROID/LEVOTHROID    90 tablet    Take 1 tablet (150 mcg) by mouth daily    Hypothyroidism due to acquired atrophy of thyroid        magnesium 500 MG Tabs      Take 500 mg by mouth daily        MULTI COMPLETE PO           PARoxetine 10 MG tablet    PAXIL    90 tablet    Take 1 tablet (10 mg) by mouth At Bedtime    Menopausal syndrome (hot flashes)       rizatriptan 5 MG tablet    MAXALT    18 tablet    Take 1-2 tablets (5-10 mg) by mouth at onset of headache for migraine May repeat in 2 hours. Max 6 tablets/24 hours.    Migraine with aura and without status migrainosus, not intractable

## 2018-08-24 PROBLEM — E03.9 HYPOTHYROIDISM, UNSPECIFIED TYPE: Status: ACTIVE | Noted: 2018-08-24

## 2018-08-24 PROBLEM — E66.812 CLASS 2 OBESITY DUE TO EXCESS CALORIES WITHOUT SERIOUS COMORBIDITY WITH BODY MASS INDEX (BMI) OF 39.0 TO 39.9 IN ADULT: Status: ACTIVE | Noted: 2018-08-24

## 2018-08-24 PROBLEM — N95.1 MENOPAUSAL SYNDROME (HOT FLASHES): Status: ACTIVE | Noted: 2018-08-24

## 2018-08-24 PROBLEM — E66.09 CLASS 2 OBESITY DUE TO EXCESS CALORIES WITHOUT SERIOUS COMORBIDITY WITH BODY MASS INDEX (BMI) OF 39.0 TO 39.9 IN ADULT: Status: ACTIVE | Noted: 2018-08-24

## 2018-08-24 LAB
HCV AB SERPL QL IA: NONREACTIVE
HIV 1+2 AB+HIV1 P24 AG SERPL QL IA: NONREACTIVE
T4 FREE SERPL-MCNC: 1.29 NG/DL (ref 0.76–1.46)
TSH SERPL DL<=0.005 MIU/L-ACNC: 0.1 MU/L (ref 0.4–4)

## 2018-08-27 ENCOUNTER — TELEPHONE (OUTPATIENT)
Dept: FAMILY MEDICINE | Facility: CLINIC | Age: 63
End: 2018-08-27

## 2018-08-27 DIAGNOSIS — E03.4 HYPOTHYROIDISM DUE TO ACQUIRED ATROPHY OF THYROID: ICD-10-CM

## 2018-08-27 RX ORDER — LEVOTHYROXINE SODIUM 137 UG/1
150 TABLET ORAL DAILY
Qty: 30 TABLET | Refills: 0 | Status: SHIPPED | OUTPATIENT
Start: 2018-08-27 | End: 2018-08-28

## 2018-08-27 NOTE — TELEPHONE ENCOUNTER
Notes Recorded by Noemi Mayer MD on 8/27/2018 at 2:09 PM  Please Call  ______________________________     Dear Nasreen,   Here are your recent results.  Your TSH level  continues to be low at 0.10.  I would recommend decreasing your levothyroxine dose to bring your TSH level to the therapeutic ranges.  A new dose of Levothyroxine 137 mcg was sent to patient's pharmacy.     Noemi Mayer MD

## 2018-08-27 NOTE — PROGRESS NOTES
Please Call  ______________________________     Dear Nasreen,   Here are your recent results.  Your TSH level  continues to be low at 0.10.  I would recommend decreasing your levothyroxine dose to bring your TSH level to the therapeutic ranges.  A new dose of Levothyroxine 137 mcg was sent to patient's pharmacy.     Noemi Mayer MD

## 2018-08-27 NOTE — TELEPHONE ENCOUNTER
Reason for Call:  Other prescription    Detailed comments: patient wants pharmacy changed to [facility] [address], please send synthroid there.  Also asking if it can be for 90 days instead of 30.  She figured that would be the preferred option to try the new dosage.     Phone Number Patient can be reached at: Home number on file [phone] (home)    Best Time: any    Can we leave a detailed message on this number? YES    Call taken on 8/27/2018 at 5:11 PM by Aury Gonzalez

## 2018-08-28 RX ORDER — LEVOTHYROXINE SODIUM 137 UG/1
150 TABLET ORAL DAILY
Qty: 30 TABLET | Refills: 0 | Status: SHIPPED | OUTPATIENT
Start: 2018-08-28 | End: 2018-09-25

## 2018-08-28 NOTE — TELEPHONE ENCOUNTER
Rx sent to [facility].  Attempt to call [facility] on [address] where Rx originally sent.  Pharmacy is closed  FS sent in #30 due to patient needing to recheck her labs  VM left asking patient to call back.  Val Arrington RN

## 2018-08-30 ENCOUNTER — OFFICE VISIT (OUTPATIENT)
Dept: FAMILY MEDICINE | Facility: CLINIC | Age: 63
End: 2018-08-30
Payer: COMMERCIAL

## 2018-08-30 VITALS
TEMPERATURE: 98.4 F | BODY MASS INDEX: 38.93 KG/M2 | HEIGHT: 64 IN | DIASTOLIC BLOOD PRESSURE: 83 MMHG | SYSTOLIC BLOOD PRESSURE: 177 MMHG | OXYGEN SATURATION: 98 % | WEIGHT: 228 LBS

## 2018-08-30 DIAGNOSIS — B00.9 HSV (HERPES SIMPLEX VIRUS) INFECTION: Primary | ICD-10-CM

## 2018-08-30 PROCEDURE — 99213 OFFICE O/P EST LOW 20 MIN: CPT | Performed by: FAMILY MEDICINE

## 2018-08-30 RX ORDER — VALACYCLOVIR HYDROCHLORIDE 500 MG/1
500 TABLET, FILM COATED ORAL 2 TIMES DAILY
Qty: 6 TABLET | Refills: 3 | Status: SHIPPED | OUTPATIENT
Start: 2018-08-30 | End: 2019-10-22

## 2018-08-30 NOTE — PROGRESS NOTES
"  SUBJECTIVE:   Nasreen Galarza is a 63 year old female who presents to clinic today for the following health issues:    Vaginal Problem  Vaginal sore    The patient has a history of herpes diagnosed in the 1970s.  She had a sporadic outbreak once every 1-2 years.  Most outbreaks resolves spontaneously without medications.  Current outbreak is not resolving spontaneously.    Problem list and histories reviewed & adjusted, as indicated.  Additional history: as documented    Patient Active Problem List   Diagnosis     Migraine with aura and without status migrainosus, not intractable     Hypothyroidism, unspecified type     Class 2 obesity due to excess calories without serious comorbidity with body mass index (BMI) of 39.0 to 39.9 in adult     Menopausal syndrome (hot flashes)     Past Surgical History:   Procedure Laterality Date     ABDOMEN SURGERY  2008    Lap band     ARTHROPLASTY HIP  2011    L hip replacement     CHOLECYSTECTOMY  1996     ORTHOPEDIC SURGERY  1991    L4-L5 diskectomy '91       Social History   Substance Use Topics     Smoking status: Former Smoker     Packs/day: 2.00     Years: 18.00     Types: Cigarettes     Start date: 1/1/1970     Quit date: 10/18/1988     Smokeless tobacco: Never Used     Alcohol use Yes      Comment: occ 1x/month     Family History   Problem Relation Age of Onset     Adopted: Yes     Unknown/Adopted No family hx of            Reviewed and updated as needed this visit by clinical staff  Tobacco  Meds       Reviewed and updated as needed this visit by Provider         ROS:  Constitutional, HEENT, cardiovascular, pulmonary, gi and gu systems are negative, except as otherwise noted.    OBJECTIVE:     /83 (BP Location: Right arm, Cuff Size: Adult Large)  Temp 98.4  F (36.9  C) (Oral)  Ht 5' 4\" (1.626 m)  Wt 228 lb (103.4 kg)  SpO2 98%  BMI 39.14 kg/m2  Body mass index is 39.14 kg/(m^2).  GENERAL: healthy, alert and no distress  RESP: lungs clear to auscultation - no " rales, rhonchi or wheezes  PSYCH: mentation appears normal, affect normal/bright    Diagnostic Test Results:  none     ASSESSMENT/PLAN:     1. HSV (herpes simplex virus) infection  - valACYclovir (VALTREX) 500 MG tablet; Take 1 tablet (500 mg) by mouth 2 times daily 3 days only.  Dispense: 6 tablet; Refill: 3    Toriws CHASIDY Mayer MD  [facility]

## 2018-08-30 NOTE — MR AVS SNAPSHOT
"              After Visit Summary   8/30/2018    Nasreen Galarza    MRN: [id number]           Patient Information     Date Of Birth          1955        Visit Information        Provider Department      8/30/2018 4:20 PM Noemi Mayer MD [facility]        Today's Diagnoses     HSV (herpes simplex virus) infection    -  1       Follow-ups after your visit        Who to contact     If you have questions or need follow up information about today's clinic visit or your schedule please contact [facility] directly at [phone].  Normal or non-critical lab and imaging results will be communicated to you by Advanced Mobile Solutionshart, letter or phone within 4 business days after the clinic has received the results. If you do not hear from us within 7 days, please contact the clinic through Advanced Mobile Solutionshart or phone. If you have a critical or abnormal lab result, we will notify you by phone as soon as possible.  Submit refill requests through Photobucket or call your pharmacy and they will forward the refill request to us. Please allow 3 business days for your refill to be completed.          Additional Information About Your Visit        MyChart Information     Photobucket gives you secure access to your electronic health record. If you see a primary care provider, you can also send messages to your care team and make appointments. If you have questions, please call your primary care clinic.  If you do not have a primary care provider, please call [phone] and they will assist you.        Care EveryWhere ID     This is your Care EveryWhere ID. This could be used by other organizations to access your [place] medical records  [id number]        Your Vitals Were     Temperature Height Pulse Oximetry BMI (Body Mass Index)          98.4  F (36.9  C) (Oral) 5' 4\" (1.626 m) 98% 39.14 kg/m2         Blood Pressure from Last 3 Encounters:   08/30/18 177/83   08/23/18 134/67   04/16/18 121/77    Weight from Last 3 Encounters: "   08/30/18 228 lb (103.4 kg)   08/23/18 229 lb 4.8 oz (104 kg)   04/16/18 230 lb (104.3 kg)              Today, you had the following     No orders found for display         Today's Medication Changes          These changes are accurate as of 8/30/18 11:59 PM.  If you have any questions, ask your nurse or doctor.               Start taking these medicines.        Dose/Directions    valACYclovir 500 MG tablet   Commonly known as:  VALTREX   Used for:  HSV (herpes simplex virus) infection   Started by:  Noemi Mayer MD        Dose:  500 mg   Take 1 tablet (500 mg) by mouth 2 times daily 3 days only.   Quantity:  6 tablet   Refills:  3            Where to get your medicines      These medications were sent to [facility] [address] [place] [address] AT [address] [address]    Hours:  24-hours Phone:  [phone]     valACYclovir 500 MG tablet                Primary Care Provider Office Phone # Fax #    Noemi Mayer [phone] [address] [address]        Equal Access to Services     CLAUDIA MARQUEZ AH: Hadii aad quita hadasho Sokhoiali, waaxda luqadaha, qaybta kaalmada adeegyada, selena george. So [facility] [phone].    ATENCIÓN: Si habla español, tiene a abad disposición servicios gratuitos de asistencia lingüística. Llame al [phone].    We comply with applicable federal civil rights laws and [place] laws. We do not discriminate on the basis of race, color, national origin, age, disability, sex, sexual orientation, or gender identity.            Thank you!     Thank you for choosing [facility]  for your care. Our goal is always to provide you with excellent care. Hearing back from our patients is one way we can continue to improve our services. Please take a few minutes to complete the written survey that you may receive in the mail after your visit with us. Thank  you!             Your Updated Medication List - Protect others around you: Learn how to safely use, store and throw away your medicines at www.disposemymeds.org.          This list is accurate as of 8/30/18 11:59 PM.  Always use your most recent med list.                   Brand Name Dispense Instructions for use Diagnosis    atorvastatin 10 MG tablet    LIPITOR    90 tablet    Take 8 tablets (80 mg) by mouth daily    Hypercholesterolemia       Fesoterodine Fumarate 8 MG Tb24    TOVIAZ    90 tablet    Take 1 tablet (8 mg) by mouth daily    Urinary urgency       fish Oil 1200 MG capsule      Take 2,400 mg by mouth daily        levothyroxine 137 MCG tablet    SYNTHROID/LEVOTHROID    30 tablet    Take 1 tablet (137 mcg) by mouth daily    Hypothyroidism due to acquired atrophy of thyroid       magnesium 500 MG Tabs      Take 500 mg by mouth daily        MULTI COMPLETE PO           PARoxetine 10 MG tablet    PAXIL    90 tablet    Take 1 tablet (10 mg) by mouth At Bedtime    Menopausal syndrome (hot flashes)       rizatriptan 5 MG tablet    MAXALT    18 tablet    Take 1-2 tablets (5-10 mg) by mouth at onset of headache for migraine May repeat in 2 hours. Max 6 tablets/24 hours.    Migraine with aura and without status migrainosus, not intractable       valACYclovir 500 MG tablet    VALTREX    6 tablet    Take 1 tablet (500 mg) by mouth 2 times daily 3 days only.    HSV (herpes simplex virus) infection

## 2018-08-30 NOTE — NURSING NOTE
"Chief Complaint   Patient presents with     Vaginal Problem     sore in the vaginal area     initial /83 (BP Location: Right arm, Cuff Size: Adult Large)  Temp 98.4  F (36.9  C) (Oral)  Ht 5' 4\" (1.626 m)  Wt 228 lb (103.4 kg)  SpO2 98%  BMI 39.14 kg/m2 Estimated body mass index is 39.14 kg/(m^2) as calculated from the following:    Height as of this encounter: 5' 4\" (1.626 m).    Weight as of this encounter: 228 lb (103.4 kg).  BP completed using cuff size: regular.   R arm      Health Maintenance that is potentially due pending provider review:  NONE    n/a    Dominik Palacios ma  "

## 2018-08-31 PROBLEM — B00.9 HSV (HERPES SIMPLEX VIRUS) INFECTION: Status: ACTIVE | Noted: 2018-08-31

## 2018-08-31 NOTE — TELEPHONE ENCOUNTER
Called [facility]  Pharmacist states her insurance doesn't cover [facility].  Rx was not filled there.  Val Arrington RN

## 2018-09-14 DIAGNOSIS — E03.4 HYPOTHYROIDISM DUE TO ACQUIRED ATROPHY OF THYROID: ICD-10-CM

## 2018-09-14 RX ORDER — LEVOTHYROXINE SODIUM 137 UG/1
150 TABLET ORAL DAILY
Qty: 30 TABLET | Refills: 0 | Status: CANCELLED | OUTPATIENT
Start: 2018-09-14

## 2018-09-14 NOTE — TELEPHONE ENCOUNTER
"PHARMACY REQUESTING THE 150MCG TABLET DOSE, BUT IT APPEARS DOSE WAS LOWERED TO 137MCG AT THE 8/23/18 OFFICE VISIT.    Requested Prescriptions   Pending Prescriptions Disp Refills     levothyroxine (SYNTHROID/LEVOTHROID) 137 MCG tablet  PHARMACY REQUESTING 150MCG.  Last Written Prescription Date:  8/28/18  Last Fill Quantity: 30 TABLET,  # refills: 0   Last office visit: 8/30/2018 with prescribing provider:  GIDEON   Future Office Visit:     30 tablet 0     Sig: Take 1 tablet (137 mcg) by mouth daily    Thyroid Protocol Failed    9/14/2018  9:02 AM       Failed - Normal TSH on file in past 12 months    Recent Labs   Lab Test  08/23/18   1615   TSH  0.10*             Passed - Patient is 12 years or older       Passed - Recent (12 mo) or future (30 days) visit within the authorizing provider's specialty    Patient had office visit in the last 12 months or has a visit in the next 30 days with authorizing provider or within the authorizing provider's specialty.  See \"Patient Info\" tab in inbasket, or \"Choose Columns\" in Meds & Orders section of the refill encounter.           Passed - No active pregnancy on record    If patient is pregnant or has had a positive pregnancy test, please check TSH.         Passed - No positive pregnancy test in past 12 months    If patient is pregnant or has had a positive pregnancy test, please check TSH.            "

## 2018-09-17 ENCOUNTER — OFFICE VISIT (OUTPATIENT)
Dept: FAMILY MEDICINE | Facility: CLINIC | Age: 63
End: 2018-09-17
Payer: COMMERCIAL

## 2018-09-17 VITALS
DIASTOLIC BLOOD PRESSURE: 69 MMHG | RESPIRATION RATE: 16 BRPM | HEART RATE: 78 BPM | HEIGHT: 64 IN | TEMPERATURE: 98.1 F | BODY MASS INDEX: 38.62 KG/M2 | WEIGHT: 226.2 LBS | OXYGEN SATURATION: 100 % | SYSTOLIC BLOOD PRESSURE: 145 MMHG

## 2018-09-17 DIAGNOSIS — L30.9 PERIORBITAL DERMATITIS: Primary | ICD-10-CM

## 2018-09-17 DIAGNOSIS — Z23 NEED FOR PROPHYLACTIC VACCINATION AND INOCULATION AGAINST INFLUENZA: ICD-10-CM

## 2018-09-17 PROCEDURE — 99213 OFFICE O/P EST LOW 20 MIN: CPT | Mod: 25 | Performed by: FAMILY MEDICINE

## 2018-09-17 PROCEDURE — 90686 IIV4 VACC NO PRSV 0.5 ML IM: CPT | Performed by: FAMILY MEDICINE

## 2018-09-17 PROCEDURE — 90471 IMMUNIZATION ADMIN: CPT | Performed by: FAMILY MEDICINE

## 2018-09-17 NOTE — MR AVS SNAPSHOT
After Visit Summary   9/17/2018    Nasreen Galarza    MRN: [id number]           Patient Information     Date Of Birth          1955        Visit Information        Provider Department      9/17/2018 4:20 PM Noemi Mayer MD [facility]        Today's Diagnoses     Periorbital dermatitis    -  1    Need for prophylactic vaccination and inoculation against influenza          Care Instructions      What is Atopic Dermatitis?  Atopic dermatitis (also called eczema) causes chronic skin irritation. It is often found in infants, teens, and adults. This disease often runs in families (is genetic). It may also be linked to allergies, such as hay fever and sometimes asthma. Patches of skin become dry, red, itchy, and scaly. In older adults, abnormally dry skin is often called xerosis. Sometimes eczema is only on the hands or feet. It often improves when the skin is well hydrated. It gets worse when the skin is dry. You can help control symptoms by practicing good self-care. Avoid anything that causes flare-ups (such as sunburn or vigorous scratching).  Where do you have symptoms?  Atopic dermatitis symptoms can appear anywhere on the body. But in most cases they vary based on the person s age. In infants, irritation is often seen on the cheeks, chin, near the mouth, and under the eyelids. In children ages 2 through 10, skin folds, such as the backs of the knees, or in the arm crease, are most often affected. In children 11 and older and in adults, symptoms can affect many areas.  What triggers symptoms?  Symptoms flare because of many things. These include skin dryness, scratching, stress, harsh soaps, and irritants such as dust or wool. Try to avoid anything that causes flare-ups.  Recognizing what causes flare-ups  To figure out what causes atopic dermatitis to flare, keep a list of things that seem to affect your skin. Start by filling in the spaces below. Then keep writing them down in a  "notebook or diary. The things that affect each person vary. So keep your own list and try to avoid your triggers.    Date Last Reviewed: 2/1/2017 2000-2017 [employer]. [address] [place] [address], [place] [address]. All rights reserved. This information is not intended as a substitute for professional medical care. Always follow your healthcare professional's instructions.                Follow-ups after your visit        Who to contact     If you have questions or need follow up information about today's clinic visit or your schedule please contact [facility] directly at [phone].  Normal or non-critical lab and imaging results will be communicated to you by [employer], letter or phone within 4 business days after the clinic has received the results. If you do not hear from us within 7 days, please contact the clinic through [facility] or phone. If you have a critical or abnormal lab result, we will notify you by phone as soon as possible.  Submit refill requests through [employer] or call your pharmacy and they will forward the refill request to us. Please allow 3 business days for your refill to be completed.          Additional Information About Your Visit        MyChart Information     [employer] gives you secure access to your electronic health record. If you see a primary care provider, you can also send messages to your care team and make appointments. If you have questions, please call your primary care clinic.  If you do not have a primary care provider, please call [phone] and they will assist you.        Care EveryWhere ID     This is your Care EveryWhere ID. This could be used by other organizations to access your [place] medical records  [id number]        Your Vitals Were     Pulse Temperature Respirations Height Pulse Oximetry Breastfeeding?    78 98.1  F (36.7  C) (Oral) 16 5' 4\" (1.626 m) 100% No    BMI (Body Mass Index)                   38.83 kg/m2            Blood " Pressure from Last 3 Encounters:   09/17/18 145/69   08/30/18 177/83   08/23/18 134/67    Weight from Last 3 Encounters:   09/17/18 226 lb 3.2 oz (102.6 kg)   08/30/18 228 lb (103.4 kg)   08/23/18 229 lb 4.8 oz (104 kg)              We Performed the Following     ADMIN INFLUENZA VIRUS VAC     FLU VACCINE, 3 YRS +, IM (FLUZONE)        Primary Care Provider Office Phone # Fax #    Noemi Prakash Mayer [phone] [address]  [facility] [address]        Equal Access to Services     [facility] : Hadii wagner Montilla, wajaimeda beverly, keshawn peoplesalmada lillian, selena juarez . So [facility] [phone].    ATENCIÓN: Si habla español, tiene a abad disposición servicios gratuitos de asistencia lingüística. Llame al [phone].    We comply with applicable federal civil rights laws and [place] laws. We do not discriminate on the basis of race, color, national origin, age, disability, sex, sexual orientation, or gender identity.            Thank you!     Thank you for choosing [facility]  for your care. Our goal is always to provide you with excellent care. Hearing back from our patients is one way we can continue to improve our services. Please take a few minutes to complete the written survey that you may receive in the mail after your visit with us. Thank you!             Your Updated Medication List - Protect others around you: Learn how to safely use, store and throw away your medicines at www.disposemymeds.org.          This list is accurate as of 9/17/18  4:28 PM.  Always use your most recent med list.                   Brand Name Dispense Instructions for use Diagnosis    atorvastatin 10 MG tablet    LIPITOR    90 tablet    Take 8 tablets (80 mg) by mouth daily    Hypercholesterolemia       Fesoterodine Fumarate 8 MG Tb24    TOVIAZ    90 tablet    Take 1 tablet (8 mg) by mouth daily    Urinary urgency       fish Oil 1200 MG capsule       Take 2,400 mg by mouth daily        levothyroxine 137 MCG tablet    SYNTHROID/LEVOTHROID    30 tablet    Take 1 tablet (137 mcg) by mouth daily    Hypothyroidism due to acquired atrophy of thyroid       magnesium 500 MG Tabs      Take 500 mg by mouth daily        MULTI COMPLETE PO           PARoxetine 10 MG tablet    PAXIL    90 tablet    Take 1 tablet (10 mg) by mouth At Bedtime    Menopausal syndrome (hot flashes)       rizatriptan 5 MG tablet    MAXALT    18 tablet    Take 1-2 tablets (5-10 mg) by mouth at onset of headache for migraine May repeat in 2 hours. Max 6 tablets/24 hours.    Migraine with aura and without status migrainosus, not intractable       valACYclovir 500 MG tablet    VALTREX    6 tablet    Take 1 tablet (500 mg) by mouth 2 times daily 3 days only.    HSV (herpes simplex virus) infection

## 2018-09-17 NOTE — PATIENT INSTRUCTIONS
What is Atopic Dermatitis?  Atopic dermatitis (also called eczema) causes chronic skin irritation. It is often found in infants, teens, and adults. This disease often runs in families (is genetic). It may also be linked to allergies, such as hay fever and sometimes asthma. Patches of skin become dry, red, itchy, and scaly. In older adults, abnormally dry skin is often called xerosis. Sometimes eczema is only on the hands or feet. It often improves when the skin is well hydrated. It gets worse when the skin is dry. You can help control symptoms by practicing good self-care. Avoid anything that causes flare-ups (such as sunburn or vigorous scratching).  Where do you have symptoms?  Atopic dermatitis symptoms can appear anywhere on the body. But in most cases they vary based on the person s age. In infants, irritation is often seen on the cheeks, chin, near the mouth, and under the eyelids. In children ages 2 through 10, skin folds, such as the backs of the knees, or in the arm crease, are most often affected. In children 11 and older and in adults, symptoms can affect many areas.  What triggers symptoms?  Symptoms flare because of many things. These include skin dryness, scratching, stress, harsh soaps, and irritants such as dust or wool. Try to avoid anything that causes flare-ups.  Recognizing what causes flare-ups  To figure out what causes atopic dermatitis to flare, keep a list of things that seem to affect your skin. Start by filling in the spaces below. Then keep writing them down in a notebook or diary. The things that affect each person vary. So keep your own list and try to avoid your triggers.    Date Last Reviewed: 2/1/2017 2000-2017 [employer]. [address] [place] [address], [place] [address]. All rights reserved. This information is not intended as a substitute for professional medical care. Always follow your healthcare professional's instructions.

## 2018-09-17 NOTE — PROGRESS NOTES
SUBJECTIVE:   Nasreen Galarza is a 63 year old female who presents to clinic today for the following health issues:      EYE LID CONCERNS- Dry and Itchy      Duration: 1 week    Description (location/character/radiation): both eyelids dry and itchy    Intensity:  moderate    Accompanying signs and symptoms: none    History (similar episodes/previous evaluation): None    Precipitating or alleviating factors: None    Therapies tried and outcome: Aquaphor- didn't help   Denies any other rashes.  Denies any vision changes.  Denies any changes to her face cream, lotion or detergent.    Problem list and histories reviewed & adjusted, as indicated.  Additional history: as documented    Patient Active Problem List   Diagnosis     Migraine with aura and without status migrainosus, not intractable     Hypothyroidism, unspecified type     Class 2 obesity due to excess calories without serious comorbidity with body mass index (BMI) of 39.0 to 39.9 in adult     Menopausal syndrome (hot flashes)     HSV (herpes simplex virus) infection     Past Surgical History:   Procedure Laterality Date     ABDOMEN SURGERY  2008    Lap band     ARTHROPLASTY HIP  2011    L hip replacement     CHOLECYSTECTOMY  1996     ORTHOPEDIC SURGERY  1991    L4-L5 diskectomy '91       Social History   Substance Use Topics     Smoking status: Former Smoker     Packs/day: 2.00     Years: 18.00     Types: Cigarettes     Start date: 1/1/1970     Quit date: 10/18/1988     Smokeless tobacco: Never Used     Alcohol use Yes      Comment: occ 1x/month     Family History   Problem Relation Age of Onset     Adopted: Yes     Unknown/Adopted No family hx of            Reviewed and updated as needed this visit by clinical staff  Allergies  Meds       Reviewed and updated as needed this visit by Provider         ROS:  Constitutional, HEENT, cardiovascular, pulmonary, gi and gu systems are negative, except as otherwise noted.    OBJECTIVE:     /69  Pulse 78  Temp  "98.1  F (36.7  C) (Oral)  Resp 16  Ht 5' 4\" (1.626 m)  Wt 226 lb 3.2 oz (102.6 kg)  SpO2 100%  Breastfeeding? No  BMI 38.83 kg/m2  Body mass index is 38.83 kg/(m^2).  GENERAL: healthy, alert and no distress  RESP: lungs clear to auscultation - no rales, rhonchi or wheezes  CV: regular rate and rhythm, normal S1 S2, no S3 or S4, no murmur, click or rub, no peripheral edema and peripheral pulses strong  SKIN: mild periorbital dermatitis.  Worse in bilateral upper lids.  Some flaky skin was noted.    Diagnostic Test Results:  none     ASSESSMENT/PLAN:       ICD-10-CM    1. Periorbital dermatitis L30.9    2. Need for prophylactic vaccination and inoculation against influenza Z23 FLU VACCINE, 3 YRS +, IM (FLUZONE)     ADMIN INFLUENZA VIRUS VAC     Examinations consistent with periorbital dermatitis.  Medical management versus conservative management were discussed with the patient.  Because the rash is currently very mild, I would recommend conservative management and monitoring for the next few weeks.  If the rash becomes worse I would consider a trial of tacrolimus.     Noemi Mayer MD  [facility]  "

## 2018-09-25 DIAGNOSIS — E03.4 HYPOTHYROIDISM DUE TO ACQUIRED ATROPHY OF THYROID: ICD-10-CM

## 2018-09-25 RX ORDER — LEVOTHYROXINE SODIUM 137 UG/1
150 TABLET ORAL DAILY
Qty: 30 TABLET | Refills: 0 | Status: SHIPPED | OUTPATIENT
Start: 2018-09-25 | End: 2018-10-23

## 2018-09-25 NOTE — TELEPHONE ENCOUNTER
"Medication is being filled for 1 time refill only due to:  Patient needs labs due to recheck labs in October.     Dose changes after 8/23/18 lab result.  Val Arrington RN    Requested Prescriptions   Signed Prescriptions Disp Refills     levothyroxine (SYNTHROID/LEVOTHROID) 137 MCG tablet 30 tablet 0     Sig: Take 1 tablet (137 mcg) by mouth daily Due for lab recheck in October    Thyroid Protocol Failed    9/25/2018 11:53 AM       Failed - Normal TSH on file in past 12 months    Recent Labs   Lab Test  08/23/18   1615   TSH  0.10*             Passed - Patient is 12 years or older       Passed - Recent (12 mo) or future (30 days) visit within the authorizing provider's specialty    Patient had office visit in the last 12 months or has a visit in the next 30 days with authorizing provider or within the authorizing provider's specialty.  See \"Patient Info\" tab in inbasket, or \"Choose Columns\" in Meds & Orders section of the refill encounter.           Passed - No active pregnancy on record    If patient is pregnant or has had a positive pregnancy test, please check TSH.         Passed - No positive pregnancy test in past 12 months    If patient is pregnant or has had a positive pregnancy test, please check TSH.                  "

## 2018-10-06 ENCOUNTER — MYC MEDICAL ADVICE (OUTPATIENT)
Dept: FAMILY MEDICINE | Facility: CLINIC | Age: 63
End: 2018-10-06

## 2018-10-08 NOTE — TELEPHONE ENCOUNTER
I see that hip pain has not been addressed previously. I would recommend an office visit and we will do the X-rays during her visit.     Leyla

## 2018-10-08 NOTE — TELEPHONE ENCOUNTER
FS,   Please see below MyChart message   Doesn't look like pt's seen you for hip pain  Do you want OV to discuss and do xray?  Please advise  Thanks,  Allie ANSARI RN

## 2018-10-19 ENCOUNTER — RADIANT APPOINTMENT (OUTPATIENT)
Dept: GENERAL RADIOLOGY | Facility: CLINIC | Age: 63
End: 2018-10-19
Attending: FAMILY MEDICINE
Payer: COMMERCIAL

## 2018-10-19 ENCOUNTER — OFFICE VISIT (OUTPATIENT)
Dept: FAMILY MEDICINE | Facility: CLINIC | Age: 63
End: 2018-10-19
Payer: COMMERCIAL

## 2018-10-19 VITALS
HEIGHT: 64 IN | SYSTOLIC BLOOD PRESSURE: 134 MMHG | DIASTOLIC BLOOD PRESSURE: 75 MMHG | BODY MASS INDEX: 39.37 KG/M2 | WEIGHT: 230.6 LBS | OXYGEN SATURATION: 99 % | HEART RATE: 81 BPM

## 2018-10-19 DIAGNOSIS — M25.551 HIP PAIN, RIGHT: ICD-10-CM

## 2018-10-19 DIAGNOSIS — E66.09 CLASS 2 OBESITY DUE TO EXCESS CALORIES WITHOUT SERIOUS COMORBIDITY WITH BODY MASS INDEX (BMI) OF 39.0 TO 39.9 IN ADULT: ICD-10-CM

## 2018-10-19 DIAGNOSIS — M25.551 HIP PAIN, RIGHT: Primary | ICD-10-CM

## 2018-10-19 DIAGNOSIS — E66.812 CLASS 2 OBESITY DUE TO EXCESS CALORIES WITHOUT SERIOUS COMORBIDITY WITH BODY MASS INDEX (BMI) OF 39.0 TO 39.9 IN ADULT: ICD-10-CM

## 2018-10-19 PROCEDURE — 73502 X-RAY EXAM HIP UNI 2-3 VIEWS: CPT | Mod: FY

## 2018-10-19 PROCEDURE — 99214 OFFICE O/P EST MOD 30 MIN: CPT | Performed by: FAMILY MEDICINE

## 2018-10-19 NOTE — PROGRESS NOTES
SUBJECTIVE:   Nasreen Galarza is a 63 year old female who presents to clinic today for the following health issues:      Chief Complaint   Patient presents with     Musculoskeletal Problem     Right Hip Pain- Needs to have hip xray done so that she can be seen at [facility]     The patient presents to clinic for evaluation of right hip pain. She reports intermittent episodes of right hip pain. Pain is mild 3-4/10. She had two episodes when she felt that the hip clicked and moved from its socket. Pain at that time was 7/10. Denies any recent fall. Pain has been worsening over the past 3-4 months. Denies any numbness or tingling. She had a left hip replacement in 2011. Current symptoms are similar to her symptoms prior to the last hip replacement. The patient desires to see an orthopedic doctor affiliated with [facility] and desires to have imaging done prior to getting an appointment.     Mail results/images to:   [facility] ortho  [address] [place] [id number]    Pain control with  - Taking aleve a few times per week.     Problem list and histories reviewed & adjusted, as indicated.  Additional history: as documented    Patient Active Problem List   Diagnosis     Migraine with aura and without status migrainosus, not intractable     Hypothyroidism, unspecified type     Class 2 obesity due to excess calories without serious comorbidity with body mass index (BMI) of 39.0 to 39.9 in adult     Menopausal syndrome (hot flashes)     HSV (herpes simplex virus) infection     Past Surgical History:   Procedure Laterality Date     ABDOMEN SURGERY  2008    Lap band     ARTHROPLASTY HIP  2011    L hip replacement     CHOLECYSTECTOMY  1996     ORTHOPEDIC SURGERY  1991    L4-L5 diskectomy '91       Social History   Substance Use Topics     Smoking status: Former Smoker     Packs/day: 2.00     Years: 18.00     Types: Cigarettes     Start date: 1/1/1970     Quit date: 10/18/1988     Smokeless tobacco: Never Used     Alcohol  "use Yes      Comment: occ 1x/month     Family History   Problem Relation Age of Onset     Adopted: Yes     Unknown/Adopted No family hx of            Reviewed and updated as needed this visit by clinical staff  Tobacco  Allergies  Meds  Med Hx  Surg Hx  Fam Hx  Soc Hx      Reviewed and updated as needed this visit by Provider         ROS:  Constitutional, HEENT, cardiovascular, pulmonary, gi and gu systems are negative, except as otherwise noted.    OBJECTIVE:     /75  Pulse 81  Ht 5' 4\" (1.626 m)  Wt 230 lb 9.6 oz (104.6 kg)  SpO2 99%  Breastfeeding? No  BMI 39.58 kg/m2  Body mass index is 39.58 kg/(m^2).  GENERAL: healthy, alert and no distress  RESP: lungs clear to auscultation - no rales, rhonchi or wheezes  CV: regular rate and rhythm, normal S1 S2, no S3 or S4, no murmur, click or rub, no peripheral edema and peripheral pulses strong  ABDOMEN: soft, nontender, no hepatosplenomegaly, no masses and bowel sounds normal  MS: RLE exam shows normal strength and muscle mass, no deformities, no erythema, induration, or nodules, no evidence of joint effusion, ROM of all joints is normal and no evidence of joint instability and LLE exam shows normal strength and muscle mass, no deformities, no erythema, induration, or nodules, no evidence of joint effusion and ROM of all joints is normal    Diagnostic Test Results:  none     ASSESSMENT/PLAN:       ICD-10-CM    1. Hip pain, right M25.551 XR Pelvis w Hip Right G/E 2 Views   2. Class 2 obesity due to excess calories without serious comorbidity with body mass index (BMI) of 39.0 to 39.9 in adult E66.09     Z68.39        Assessment: Exact etiology of the pain is not clear at this time. Will order X-rays today and treat/manage based on X-ray results.   Plan:  - XR Pelvis w Hip Right G/E 2 Views; Future      Noemi Mayer MD  [facility]      "

## 2018-10-19 NOTE — MR AVS SNAPSHOT
After Visit Summary   10/19/2018    Nasreen Galarza    MRN: [id number]           Patient Information     Date Of Birth          1955        Visit Information        Provider Department      10/19/2018 1:00 PM Noemi Mayer MD [facility]        Today's Diagnoses     Hip pain, right    -  1    Class 2 obesity due to excess calories without serious comorbidity with body mass index (BMI) of 39.0 to 39.9 in adult           Follow-ups after your visit        Follow-up notes from your care team     Return in about 3 days (around 10/22/2018).      Who to contact     If you have questions or need follow up information about today's clinic visit or your schedule please contact [facility] directly at [phone].  Normal or non-critical lab and imaging results will be communicated to you by MyChart, letter or phone within 4 business days after the clinic has received the results. If you do not hear from us within 7 days, please contact the clinic through MyChart or phone. If you have a critical or abnormal lab result, we will notify you by phone as soon as possible.  Submit refill requests through [employer] or call your pharmacy and they will forward the refill request to us. Please allow 3 business days for your refill to be completed.          Additional Information About Your Visit        MyChart Information     [employer] gives you secure access to your electronic health record. If you see a primary care provider, you can also send messages to your care team and make appointments. If you have questions, please call your primary care clinic.  If you do not have a primary care provider, please call [phone] and they will assist you.        Care EveryWhere ID     This is your Care EveryWhere ID. This could be used by other organizations to access your [place] medical records  [id number]        Your Vitals Were     Pulse Height Pulse Oximetry Breastfeeding? BMI (Body Mass Index)  "      81 5' 4\" (1.626 m) 99% No 39.58 kg/m2        Blood Pressure from Last 3 Encounters:   10/19/18 134/75   09/17/18 145/69   08/30/18 177/83    Weight from Last 3 Encounters:   10/19/18 230 lb 9.6 oz (104.6 kg)   09/17/18 226 lb 3.2 oz (102.6 kg)   08/30/18 228 lb (103.4 kg)               Primary Care Provider Office Phone # Fax #    Noemi Prakash Mayer [phone] [address] [facility] [address]  [facility] [address]        Equal Access to Services     TISHA MARQUEZ : Hadii wagner Montilla, wamichael paul, keshawn kaalmada lillian, selena george. So [facility] [phone].    ATENCIÓN: Si habla español, tiene a abad disposición servicios gratuitos de asistencia lingüística. Llame al [phone].    We comply with applicable federal civil rights laws and [place] laws. We do not discriminate on the basis of race, color, national origin, age, disability, sex, sexual orientation, or gender identity.            Thank you!     Thank you for choosing [facility]  for your care. Our goal is always to provide you with excellent care. Hearing back from our patients is one way we can continue to improve our services. Please take a few minutes to complete the written survey that you may receive in the mail after your visit with us. Thank you!             Your Updated Medication List - Protect others around you: Learn how to safely use, store and throw away your medicines at www.disposemymeds.org.          This list is accurate as of 10/19/18 11:59 PM.  Always use your most recent med list.                   Brand Name Dispense Instructions for use Diagnosis    atorvastatin 10 MG tablet    LIPITOR    90 tablet    Take 8 tablets (80 mg) by mouth daily    Hypercholesterolemia       Fesoterodine Fumarate 8 MG Tb24    TOVIAZ    90 tablet    Take 1 tablet (8 mg) by mouth daily    Urinary urgency       fish Oil 1200 MG capsule      Take 2,400 mg by mouth daily        " levothyroxine 137 MCG tablet    SYNTHROID/LEVOTHROID    30 tablet    Take 1 tablet (137 mcg) by mouth daily Due for lab recheck in October    Hypothyroidism due to acquired atrophy of thyroid       magnesium 500 MG Tabs      Take 500 mg by mouth daily        MULTI COMPLETE PO           PARoxetine 10 MG tablet    PAXIL    90 tablet    Take 1 tablet (10 mg) by mouth At Bedtime    Menopausal syndrome (hot flashes)       rizatriptan 5 MG tablet    MAXALT    18 tablet    Take 1-2 tablets (5-10 mg) by mouth at onset of headache for migraine May repeat in 2 hours. Max 6 tablets/24 hours.    Migraine with aura and without status migrainosus, not intractable       valACYclovir 500 MG tablet    VALTREX    6 tablet    Take 1 tablet (500 mg) by mouth 2 times daily 3 days only.    HSV (herpes simplex virus) infection

## 2018-10-23 DIAGNOSIS — E03.4 HYPOTHYROIDISM DUE TO ACQUIRED ATROPHY OF THYROID: ICD-10-CM

## 2018-10-24 NOTE — TELEPHONE ENCOUNTER
"Routing refill request to provider for review/approval because:  Labs out of range:  TSH  Patient due for 6 wk recheck of TSH if you approve  Please place lab orders - do you want T3 and T4 too?  Allie ANSARI RN    Requested Prescriptions   Pending Prescriptions Disp Refills     levothyroxine (SYNTHROID/LEVOTHROID) 137 MCG tablet 30 tablet 0     Sig: Take 1 tablet (137 mcg) by mouth daily Due for lab recheck in October    Thyroid Protocol Failed    10/23/2018  4:52 PM       Failed - Normal TSH on file in past 12 months    Recent Labs   Lab Test  08/23/18   1615   TSH  0.10*             Passed - Patient is 12 years or older       Passed - Recent (12 mo) or future (30 days) visit within the authorizing provider's specialty    Patient had office visit in the last 12 months or has a visit in the next 30 days with authorizing provider or within the authorizing provider's specialty.  See \"Patient Info\" tab in inbasket, or \"Choose Columns\" in Meds & Orders section of the refill encounter.             Passed - No active pregnancy on record    If patient is pregnant or has had a positive pregnancy test, please check TSH.         Passed - No positive pregnancy test in past 12 months    If patient is pregnant or has had a positive pregnancy test, please check TSH.              "

## 2018-10-25 RX ORDER — LEVOTHYROXINE SODIUM 137 UG/1
150 TABLET ORAL DAILY
Qty: 30 TABLET | Refills: 0 | Status: SHIPPED | OUTPATIENT
Start: 2018-10-25 | End: 2018-11-08

## 2018-10-25 NOTE — TELEPHONE ENCOUNTER
TSH is sufficient.     Rx for Levothyroxine 137mcg was signed. Nasreen is due for a repeat TSH.     Leyla

## 2018-11-03 ENCOUNTER — MYC MEDICAL ADVICE (OUTPATIENT)
Dept: FAMILY MEDICINE | Facility: CLINIC | Age: 63
End: 2018-11-03

## 2018-11-07 DIAGNOSIS — E03.4 HYPOTHYROIDISM DUE TO ACQUIRED ATROPHY OF THYROID: ICD-10-CM

## 2018-11-07 PROCEDURE — 36415 COLL VENOUS BLD VENIPUNCTURE: CPT | Performed by: FAMILY MEDICINE

## 2018-11-07 PROCEDURE — 84443 ASSAY THYROID STIM HORMONE: CPT | Performed by: FAMILY MEDICINE

## 2018-11-08 ENCOUNTER — MYC MEDICAL ADVICE (OUTPATIENT)
Dept: FAMILY MEDICINE | Facility: CLINIC | Age: 63
End: 2018-11-08

## 2018-11-08 DIAGNOSIS — E03.4 HYPOTHYROIDISM DUE TO ACQUIRED ATROPHY OF THYROID: ICD-10-CM

## 2018-11-08 LAB — TSH SERPL DL<=0.005 MIU/L-ACNC: 3.24 MU/L (ref 0.4–4)

## 2018-11-08 RX ORDER — LEVOTHYROXINE SODIUM 137 UG/1
137 TABLET ORAL DAILY
Qty: 90 TABLET | Refills: 1 | Status: SHIPPED | OUTPATIENT
Start: 2018-11-08 | End: 2019-05-20

## 2018-11-08 NOTE — PROGRESS NOTES
Please call the patient with the results.     __________________  Dear Nasreen,   Here are your recent results. Your TSH is excellent.       Best regards,   Noemi Mayer MD

## 2018-11-09 ENCOUNTER — MYC MEDICAL ADVICE (OUTPATIENT)
Dept: FAMILY MEDICINE | Facility: CLINIC | Age: 63
End: 2018-11-09

## 2018-12-20 ENCOUNTER — TRANSFERRED RECORDS (OUTPATIENT)
Dept: HEALTH INFORMATION MANAGEMENT | Facility: CLINIC | Age: 63
End: 2018-12-20

## 2019-01-11 ENCOUNTER — MYC MEDICAL ADVICE (OUTPATIENT)
Dept: FAMILY MEDICINE | Facility: CLINIC | Age: 64
End: 2019-01-11

## 2019-01-11 ENCOUNTER — E-VISIT (OUTPATIENT)
Dept: FAMILY MEDICINE | Facility: CLINIC | Age: 64
End: 2019-01-11
Payer: COMMERCIAL

## 2019-01-11 DIAGNOSIS — F41.0 ANXIETY ATTACK: Primary | ICD-10-CM

## 2019-01-11 DIAGNOSIS — N95.1 MENOPAUSAL SYNDROME (HOT FLASHES): ICD-10-CM

## 2019-01-11 PROCEDURE — 99444 ZZC PHYSICIAN ONLINE EVALUATION & MANAGEMENT SERVICE: CPT | Performed by: FAMILY MEDICINE

## 2019-01-11 RX ORDER — PAROXETINE 20 MG/1
20 TABLET, FILM COATED ORAL AT BEDTIME
Qty: 30 TABLET | Refills: 0 | Status: CANCELLED | OUTPATIENT
Start: 2019-01-11 | End: 2019-02-10

## 2019-01-11 ASSESSMENT — ANXIETY QUESTIONNAIRES
6. BECOMING EASILY ANNOYED OR IRRITABLE: MORE THAN HALF THE DAYS
5. BEING SO RESTLESS THAT IT IS HARD TO SIT STILL: MORE THAN HALF THE DAYS
GAD7 TOTAL SCORE: 19
7. FEELING AFRAID AS IF SOMETHING AWFUL MIGHT HAPPEN: NEARLY EVERY DAY
GAD7 TOTAL SCORE: 19
2. NOT BEING ABLE TO STOP OR CONTROL WORRYING: NEARLY EVERY DAY
GAD7 TOTAL SCORE: 19
1. FEELING NERVOUS, ANXIOUS, OR ON EDGE: NEARLY EVERY DAY
7. FEELING AFRAID AS IF SOMETHING AWFUL MIGHT HAPPEN: NEARLY EVERY DAY
3. WORRYING TOO MUCH ABOUT DIFFERENT THINGS: NEARLY EVERY DAY
4. TROUBLE RELAXING: NEARLY EVERY DAY

## 2019-01-11 ASSESSMENT — PATIENT HEALTH QUESTIONNAIRE - PHQ9
SUM OF ALL RESPONSES TO PHQ QUESTIONS 1-9: 21
10. IF YOU CHECKED OFF ANY PROBLEMS, HOW DIFFICULT HAVE THESE PROBLEMS MADE IT FOR YOU TO DO YOUR WORK, TAKE CARE OF THINGS AT HOME, OR GET ALONG WITH OTHER PEOPLE: SOMEWHAT DIFFICULT
SUM OF ALL RESPONSES TO PHQ QUESTIONS 1-9: 21

## 2019-01-11 NOTE — TELEPHONE ENCOUNTER
Deanelia Heller.   I read your note from your E visit.  It seems that recent changes in your life are causing anxiety.  We normally manage mental health conditions such as anxiety and depression with serotonin specific reuptake inhibitors.  You are already taking Paxil (10 mg) once daily.  I would recommend increasing the dose to 15mg once daily x 1 week and then increase it to 20 mg once daily.   It will take your body 2 weeks in order to adapt to the new level and your mental health should improve with adjustment of your medication and therapy.    What are your thoughts and going up on the Paxil?    Leyla

## 2019-01-12 ASSESSMENT — PATIENT HEALTH QUESTIONNAIRE - PHQ9: SUM OF ALL RESPONSES TO PHQ QUESTIONS 1-9: 21

## 2019-01-12 ASSESSMENT — ANXIETY QUESTIONNAIRES: GAD7 TOTAL SCORE: 19

## 2019-01-15 ENCOUNTER — MYC MEDICAL ADVICE (OUTPATIENT)
Dept: FAMILY MEDICINE | Facility: CLINIC | Age: 64
End: 2019-01-15

## 2019-01-15 RX ORDER — LORAZEPAM 0.5 MG/1
0.5 TABLET ORAL EVERY 6 HOURS PRN
Qty: 12 TABLET | Refills: 0 | Status: SHIPPED | OUTPATIENT
Start: 2019-01-15 | End: 2019-01-16

## 2019-01-16 DIAGNOSIS — F41.0 ANXIETY ATTACK: ICD-10-CM

## 2019-01-16 RX ORDER — LORAZEPAM 0.5 MG/1
.25-.5 TABLET ORAL EVERY 6 HOURS PRN
Qty: 12 TABLET | Refills: 0 | Status: SHIPPED | OUTPATIENT
Start: 2019-01-16 | End: 2020-08-31

## 2019-01-16 NOTE — TELEPHONE ENCOUNTER
Dear Nasreen.   I placed a prescription for Ativan for you. I will check if we can print it and fax it to your pharmacy.     I hope this helps.      Leyla

## 2019-01-21 ENCOUNTER — OFFICE VISIT (OUTPATIENT)
Dept: OPTOMETRY | Facility: CLINIC | Age: 64
End: 2019-01-21
Payer: COMMERCIAL

## 2019-01-21 DIAGNOSIS — H52.4 PRESBYOPIA: ICD-10-CM

## 2019-01-21 DIAGNOSIS — Z01.01 ENCOUNTER FOR EXAMINATION OF EYES AND VISION WITH ABNORMAL FINDINGS: Primary | ICD-10-CM

## 2019-01-21 DIAGNOSIS — H25.13 NUCLEAR AGE-RELATED CATARACT, BOTH EYES: ICD-10-CM

## 2019-01-21 DIAGNOSIS — H52.03 HYPERMETROPIA, BILATERAL: ICD-10-CM

## 2019-01-21 DIAGNOSIS — H52.223 REGULAR ASTIGMATISM OF BOTH EYES: ICD-10-CM

## 2019-01-21 PROCEDURE — 92015 DETERMINE REFRACTIVE STATE: CPT | Performed by: OPTOMETRIST

## 2019-01-21 PROCEDURE — 92004 COMPRE OPH EXAM NEW PT 1/>: CPT | Performed by: OPTOMETRIST

## 2019-01-21 ASSESSMENT — TONOMETRY
OD_IOP_MMHG: 16
IOP_METHOD: APPLANATION
OS_IOP_MMHG: 14

## 2019-01-21 ASSESSMENT — REFRACTION_WEARINGRX
OS_CYLINDER: +0.75
OD_CYLINDER: +0.25
OS_ADD: +2.75
OS_AXIS: 032
SPECS_TYPE: PAL
OD_AXIS: 153
OD_ADD: +2.75
OS_SPHERE: +0.50
OD_SPHERE: +0.50

## 2019-01-21 ASSESSMENT — REFRACTION_MANIFEST
OD_ADD: +2.50
OS_AXIS: 175
OD_CYLINDER: +0.75
OS_CYLINDER: +0.75
OS_SPHERE: +0.25
OD_AXIS: 175
OD_SPHERE: +0.50
OS_ADD: +2.50

## 2019-01-21 ASSESSMENT — EXTERNAL EXAM - RIGHT EYE: OD_EXAM: NORMAL

## 2019-01-21 ASSESSMENT — VISUAL ACUITY
OS_SC: 20/200
OS_CC: 20/30
OD_CC: 20/25
METHOD: SNELLEN - LINEAR
CORRECTION_TYPE: GLASSES
OS_SC: 20/40
OD_CC+: -1
OD_SC: 20/50
OD_SC: 20/200
OS_CC: 20/20
OD_CC: 20/40

## 2019-01-21 ASSESSMENT — SLIT LAMP EXAM - LIDS
COMMENTS: NORMAL
COMMENTS: NORMAL

## 2019-01-21 ASSESSMENT — CUP TO DISC RATIO
OD_RATIO: 0.45
OS_RATIO: 0.4

## 2019-01-21 ASSESSMENT — CONF VISUAL FIELD
OD_NORMAL: 1
OS_NORMAL: 1

## 2019-01-21 ASSESSMENT — EXTERNAL EXAM - LEFT EYE: OS_EXAM: NORMAL

## 2019-01-21 NOTE — PROGRESS NOTES
Chief Complaint   Patient presents with     Annual Eye Exam      Accompanied by self  Last Eye Exam: 1 year ago  Dilated Previously: Yes    What are you currently using to see?  Glasses-1 year old       Distance Vision Acuity: Satisfied with vision    Near Vision Acuity: Not satisfied     Eye Comfort: dry  Do you use eye drops? : Yes: unsure-systane?  Occupation or Hobbies:     Lidya Toney, Optometric Tech          Medical, surgical and family histories reviewed and updated 1/21/2019.       OBJECTIVE: See Ophthalmology exam    ASSESSMENT:    ICD-10-CM    1. Encounter for examination of eyes and vision with abnormal findings Z01.01    2. Nuclear age-related cataract, both eyes H25.13    3. 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 Regular astigmatism of both eyes H52.223    5. Presbyopia H52.4       PLAN:     Patient Instructions   You have the start of mild cataracts.  You may notice some blurred vision or glare with night driving.  It is important that you wear good sunglasses to protect your eyes from the ultraviolet light from the sun. I recommend that you return in 1 year for an eye exam unless there are any sudden changes in your vision.       Nasreen was advised of today's exam findings.  Fill glasses prescription  Allow 2 weeks to adapt to change in glasses  Wear glasses full time  Copy of glasses Rx provided today.  Return in 1 year for eye exam, or sooner if needed.    The affects of the dilating drops last for 4- 6 hours.  You will be more sensitive to light and vision will be blurry up close.  Mydriatic sunglasses were given if needed.      Alexi Perea O.D.  [address]  [place]  [address]    [phone]

## 2019-01-21 NOTE — PATIENT INSTRUCTIONS
You have the start of mild cataracts.  You may notice some blurred vision or glare with night driving.  It is important that you wear good sunglasses to protect your eyes from the ultraviolet light from the sun. I recommend that you return in 1 year for an eye exam unless there are any sudden changes in your vision.       Nasreen was advised of today's exam findings.  Fill glasses prescription  Allow 2 weeks to adapt to change in glasses  Wear glasses full time  Copy of glasses Rx provided today.  Return in 1 year for eye exam, or sooner if needed.    The affects of the dilating drops last for 4- 6 hours.  You will be more sensitive to light and vision will be blurry up close.  Mydriatic sunglasses were given if needed.      Alexi Perea O.D.  [address]. [place]  [address]    [phone]

## 2019-01-21 NOTE — LETTER
1/21/2019         RE: Nasreen Galarza  [address]  [address]  [facility] [address]        Dear Colleague,    Thank you for referring your patient, Nasreen Galarza, to the [facility]. Please see a copy of my visit note below.    Chief Complaint   Patient presents with     Annual Eye Exam      Accompanied by self  Last Eye Exam: 1 year ago  Dilated Previously: Yes    What are you currently using to see?  Glasses-1 year old       Distance Vision Acuity: Satisfied with vision    Near Vision Acuity: Not satisfied     Eye Comfort: dry  Do you use eye drops? : Yes: unsure-systane?  Occupation or Hobbies:     Lidya Toney, Optometric Tech          Medical, surgical and family histories reviewed and updated 1/21/2019.       OBJECTIVE: See Ophthalmology exam    ASSESSMENT:    ICD-10-CM    1. Encounter for examination of eyes and vision with abnormal findings Z01.01    2. Nuclear age-related cataract, both eyes H25.13    3. 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 Regular astigmatism of both eyes H52.223    5. Presbyopia H52.4       PLAN:     Patient Instructions   You have the start of mild cataracts.  You may notice some blurred vision or glare with night driving.  It is important that you wear good sunglasses to protect your eyes from the ultraviolet light from the sun. I recommend that you return in 1 year for an eye exam unless there are any sudden changes in your vision.       Nasreen was advised of today's exam findings.  Fill glasses prescription  Allow 2 weeks to adapt to change in glasses  Wear glasses full time  Copy of glasses Rx provided today.  Return in 1 year for eye exam, or sooner if needed.    The affects of the dilating drops last for 4- 6 hours.  You will be more sensitive to light and vision will be blurry up close.  Mydriatic sunglasses were given if needed.      Alexi Perea O.D.  [facility]  [address] [place]  [address] [phone]           Again, thank you for allowing me to participate in the care of your patient.        Sincerely,        Alexi Perea OD

## 2019-02-04 ENCOUNTER — OFFICE VISIT (OUTPATIENT)
Dept: FAMILY MEDICINE | Facility: CLINIC | Age: 64
End: 2019-02-04
Payer: COMMERCIAL

## 2019-02-04 VITALS — TEMPERATURE: 98.1 F | WEIGHT: 239.13 LBS | BODY MASS INDEX: 41.05 KG/M2

## 2019-02-04 DIAGNOSIS — H00.012 HORDEOLUM EXTERNUM OF RIGHT LOWER EYELID: Primary | ICD-10-CM

## 2019-02-04 PROCEDURE — 99213 OFFICE O/P EST LOW 20 MIN: CPT | Performed by: FAMILY MEDICINE

## 2019-02-04 RX ORDER — DOXYCYCLINE HYCLATE 100 MG
100 TABLET ORAL 2 TIMES DAILY
Qty: 14 TABLET | Refills: 0 | Status: SHIPPED | OUTPATIENT
Start: 2019-02-04 | End: 2019-03-22

## 2019-02-04 ASSESSMENT — PAIN SCALES - GENERAL: PAINLEVEL: NO PAIN (0)

## 2019-02-04 NOTE — PROGRESS NOTES
SUBJECTIVE:   Nasreen Galarza is a 63 year old female who presents to clinic today for the following health issues:      Eye(s) Problem      Duration: on going issue    Description:  Location: right  Pain: YES-   Redness: YES  Discharge: no    Accompanying signs and symptoms: patient drainge bump and sore to the tender     History (Trauma, foreign body exposure,): None    Precipitating or alleviating factors (contact use): None    Therapies tried and outcome:       Pt drained the lesion on her eye herself and some pus came out     There has been no foreign body in eye,   allergies.    No fever    Social History     Socioeconomic History     Marital status: Single     Spouse name: None     Number of children: None     Years of education: None     Highest education level: None   Social Needs     Financial resource strain: None     Food insecurity - worry: None     Food insecurity - inability: None     Transportation needs - medical: None     Transportation needs - non-medical: None   Occupational History     Occupation:       Employer: [employer]   Tobacco Use     Smoking status: Former Smoker     Packs/day: 2.00     Years: 18.00     Pack years: 36.00     Types: Cigarettes     Start date: 1970     Last attempt to quit: 10/18/1988     Years since quittin.3     Smokeless tobacco: Never Used   Substance and Sexual Activity     Alcohol use: Yes     Comment: occ 1x/month     Drug use: No     Sexual activity: Not Currently     Partners: Male     Birth control/protection: None   Other Topics Concern     Parent/sibling w/ CABG, MI or angioplasty before 65F 55M? No   Social History Narrative    Single, x2. Recently moved here.  Lives in a condo - child in [place]  Works for [employer]       Patient Active Problem List   Diagnosis     Migraine with aura and without status migrainosus, not intractable     Hypothyroidism, unspecified type     Class 2 obesity due to excess calories without  serious comorbidity with body mass index (BMI) of 39.0 to 39.9 in adult     Menopausal syndrome (hot flashes)     HSV (herpes simplex virus) infection         OBJECTIVE:  Temp 98.1  F (36.7  C) (Oral)   Wt 108.5 kg (239 lb 2 oz)   Breastfeeding? No   BMI 41.05 kg/m    EYES: Crusting recently drained lesion right lateral canthus  ENT: normal  Lymph: no swollen nodes    ASSESSMENT/plan:    ICD-10-CM    1. Hordeolum externum of right lower eyelid H00.012 doxycycline hyclate (VIBRA-TABS) 100 MG tablet     Draining hordeolum, probably a staph infection  Some localized swelling no obvious cellulitis but very close to her eye so  I recommend starting doxycycline  return to clinic within week if no improvement or if worsening

## 2019-02-18 ENCOUNTER — OFFICE VISIT (OUTPATIENT)
Dept: FAMILY MEDICINE | Facility: CLINIC | Age: 64
End: 2019-02-18
Payer: COMMERCIAL

## 2019-02-18 VITALS
SYSTOLIC BLOOD PRESSURE: 153 MMHG | HEART RATE: 76 BPM | HEIGHT: 64 IN | BODY MASS INDEX: 39.78 KG/M2 | RESPIRATION RATE: 14 BRPM | DIASTOLIC BLOOD PRESSURE: 83 MMHG | OXYGEN SATURATION: 95 % | WEIGHT: 233 LBS | TEMPERATURE: 98.4 F

## 2019-02-18 DIAGNOSIS — Z23 NEED FOR SHINGLES VACCINE: ICD-10-CM

## 2019-02-18 DIAGNOSIS — L24.9 IRRITANT CONTACT DERMATITIS, UNSPECIFIED TRIGGER: Primary | ICD-10-CM

## 2019-02-18 DIAGNOSIS — R03.0 ELEVATED BLOOD PRESSURE READING WITHOUT DIAGNOSIS OF HYPERTENSION: ICD-10-CM

## 2019-02-18 PROCEDURE — 99213 OFFICE O/P EST LOW 20 MIN: CPT | Performed by: FAMILY MEDICINE

## 2019-02-18 PROCEDURE — 90750 HZV VACC RECOMBINANT IM: CPT | Performed by: FAMILY MEDICINE

## 2019-02-18 PROCEDURE — 90471 IMMUNIZATION ADMIN: CPT | Performed by: FAMILY MEDICINE

## 2019-02-18 ASSESSMENT — MIFFLIN-ST. JEOR: SCORE: 1596.88

## 2019-02-18 NOTE — NURSING NOTE
"Chief Complaint   Patient presents with     Derm Problem     rash     Imm/Inj     Shingrix #1       Initial /83   Pulse 76   Temp 98.4  F (36.9  C) (Oral)   Resp 14   Ht 1.626 m (5' 4\")   Wt 105.7 kg (233 lb)   SpO2 95%   Breastfeeding? No   BMI 39.99 kg/m   Estimated body mass index is 39.99 kg/m  as calculated from the following:    Height as of this encounter: 1.626 m (5' 4\").    Weight as of this encounter: 105.7 kg (233 lb).  BP completed using cuff size: large    Health Maintenance that is potentially due pending provider review:  Health Maintenance Due   Topic Date Due     ZOSTER IMMUNIZATION (2 of 3) 06/02/2016         Per provider    "

## 2019-02-18 NOTE — PROGRESS NOTES
SUBJECTIVE:   Nasreen Galarza is a 63 year old female who presents to clinic today for the following health issues:      Rash      Duration: 2 weeks    Description  Location: around hairline around back   Itching: moderate    Intensity:  moderate    Accompanying signs and symptoms: bumps, red, itchy, flaky    History (similar episodes/previous evaluation): thinks may be from sleep mask band-threw away old one and got a new one to try    Precipitating or alleviating factors:  New exposures:  None  Recent travel: no      Therapies tried and outcome: hydrocortisone cream -  not effective    Elevated blood pressures today.  Patient is asymptomatic.     Problem list and histories reviewed & adjusted, as indicated.  Additional history: as documented    Patient Active Problem List   Diagnosis     Migraine with aura and without status migrainosus, not intractable     Hypothyroidism, unspecified type     Class 2 obesity due to excess calories without serious comorbidity with body mass index (BMI) of 39.0 to 39.9 in adult     Menopausal syndrome (hot flashes)     HSV (herpes simplex virus) infection     Past Surgical History:   Procedure Laterality Date     ABDOMEN SURGERY      Lap band     ARTHROPLASTY HIP      L hip replacement     CHOLECYSTECTOMY       ORTHOPEDIC SURGERY      L4-L5 diskectomy        Social History     Tobacco Use     Smoking status: Former Smoker     Packs/day: 2.00     Years: 18.00     Pack years: 36.00     Types: Cigarettes     Start date: 1970     Last attempt to quit: 10/18/1988     Years since quittin.3     Smokeless tobacco: Never Used   Substance Use Topics     Alcohol use: Yes     Comment: occ 1x/month     Family History   Adopted: Yes   Problem Relation Age of Onset     Unknown/Adopted No family hx of      Glaucoma No family hx of      Macular Degeneration No family hx of            Reviewed and updated as needed this visit by clinical staff       Reviewed and updated as  "needed this visit by Provider         ROS:  Constitutional, HEENT, cardiovascular, pulmonary, gi and gu systems are negative, except as otherwise noted.    OBJECTIVE:     /83   Pulse 76   Temp 98.4  F (36.9  C) (Oral)   Resp 14   Ht 1.626 m (5' 4\")   Wt 105.7 kg (233 lb)   SpO2 95%   Breastfeeding? No   BMI 39.99 kg/m    Body mass index is 39.99 kg/m .  GENERAL: healthy, alert and no distress  RESP: lungs clear to auscultation - no rales, rhonchi or wheezes  CV: regular rate and rhythm, normal S1 S2, no S3 or S4, no murmur, click or rub, no peripheral edema and peripheral pulses strong  SKIN: Mild erythema on forehead and behind both ears.  Small raised papules    Diagnostic Test Results:  none     ASSESSMENT/PLAN:   1. Irritant contact dermatitis, unspecified trigger  Examination is consistent with contact dermatitis.  Patient is already obtaining a new strap.  She may continue using over-the-counter hydrocortisone for itching and erythema.    2. Elevated blood pressure reading without diagnosis of hypertension  Patient was advised to monitor blood pressure at home.  Return to clinic if blood pressures persistently elevated systolic greater than 140 or diastolic greater than 80.    3. Need for shingles vaccine  - ZOSTER VACCINE RECOMBINANT ADJUVANTED IM NJX      Noemi Mayer MD  [facility]    "

## 2019-02-20 PROBLEM — R03.0 ELEVATED BLOOD PRESSURE READING WITHOUT DIAGNOSIS OF HYPERTENSION: Status: ACTIVE | Noted: 2019-02-20

## 2019-03-22 ENCOUNTER — OFFICE VISIT (OUTPATIENT)
Dept: FAMILY MEDICINE | Facility: CLINIC | Age: 64
End: 2019-03-22
Payer: COMMERCIAL

## 2019-03-22 VITALS
WEIGHT: 230 LBS | DIASTOLIC BLOOD PRESSURE: 80 MMHG | BODY MASS INDEX: 39.27 KG/M2 | OXYGEN SATURATION: 97 % | TEMPERATURE: 98.9 F | SYSTOLIC BLOOD PRESSURE: 152 MMHG | HEART RATE: 80 BPM | HEIGHT: 64 IN

## 2019-03-22 DIAGNOSIS — T22.251A PARTIAL THICKNESS BURN OF RIGHT SHOULDER, INITIAL ENCOUNTER: Primary | ICD-10-CM

## 2019-03-22 DIAGNOSIS — R03.0 ELEVATED BLOOD PRESSURE READING WITHOUT DIAGNOSIS OF HYPERTENSION: ICD-10-CM

## 2019-03-22 PROCEDURE — 99213 OFFICE O/P EST LOW 20 MIN: CPT | Performed by: FAMILY MEDICINE

## 2019-03-22 ASSESSMENT — MIFFLIN-ST. JEOR: SCORE: 1583.27
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
## 2019-04-26 ENCOUNTER — OFFICE VISIT (OUTPATIENT)
Dept: FAMILY MEDICINE | Facility: CLINIC | Age: 64
End: 2019-04-26
Payer: COMMERCIAL

## 2019-04-26 VITALS
OXYGEN SATURATION: 95 % | WEIGHT: 231.7 LBS | HEIGHT: 64 IN | HEART RATE: 71 BPM | SYSTOLIC BLOOD PRESSURE: 120 MMHG | DIASTOLIC BLOOD PRESSURE: 79 MMHG | TEMPERATURE: 98.1 F | BODY MASS INDEX: 39.56 KG/M2

## 2019-04-26 DIAGNOSIS — Z01.84 IMMUNITY STATUS TESTING: ICD-10-CM

## 2019-04-26 DIAGNOSIS — N95.1 MENOPAUSAL SYNDROME (HOT FLASHES): ICD-10-CM

## 2019-04-26 DIAGNOSIS — G43.109 MIGRAINE WITH AURA AND WITHOUT STATUS MIGRAINOSUS, NOT INTRACTABLE: Primary | ICD-10-CM

## 2019-04-26 PROCEDURE — 86735 MUMPS ANTIBODY: CPT | Performed by: FAMILY MEDICINE

## 2019-04-26 PROCEDURE — 99214 OFFICE O/P EST MOD 30 MIN: CPT | Mod: 25 | Performed by: FAMILY MEDICINE

## 2019-04-26 PROCEDURE — 36415 COLL VENOUS BLD VENIPUNCTURE: CPT | Performed by: FAMILY MEDICINE

## 2019-04-26 PROCEDURE — 90471 IMMUNIZATION ADMIN: CPT | Performed by: FAMILY MEDICINE

## 2019-04-26 PROCEDURE — 86762 RUBELLA ANTIBODY: CPT | Performed by: FAMILY MEDICINE

## 2019-04-26 PROCEDURE — 90750 HZV VACC RECOMBINANT IM: CPT | Performed by: FAMILY MEDICINE

## 2019-04-26 PROCEDURE — 86765 RUBEOLA ANTIBODY: CPT | Performed by: FAMILY MEDICINE

## 2019-04-26 RX ORDER — NADOLOL 20 MG/1
20 TABLET ORAL DAILY
Qty: 30 TABLET | Refills: 0 | Status: CANCELLED | OUTPATIENT
Start: 2019-04-26

## 2019-04-26 RX ORDER — ATENOLOL 25 MG/1
25 TABLET ORAL DAILY
Qty: 30 TABLET | Refills: 0 | Status: SHIPPED | OUTPATIENT
Start: 2019-04-26 | End: 2020-09-03

## 2019-04-26 ASSESSMENT — MIFFLIN-ST. JEOR: SCORE: 1590.98

## 2019-04-26 NOTE — NURSING NOTE
"Chief Complaint   Patient presents with     Migraine Mgmt     /79   Pulse 71   Temp 98.1  F (36.7  C) (Oral)   Ht 1.626 m (5' 4\")   Wt 105.1 kg (231 lb 11.2 oz)   SpO2 95%   BMI 39.77 kg/m   Estimated body mass index is 39.77 kg/m  as calculated from the following:    Height as of this encounter: 1.626 m (5' 4\").    Weight as of this encounter: 105.1 kg (231 lb 11.2 oz).  Medication Reconciliation: complete      Health Maintenance that is potentially due pending provider review:  NONE    n/a    MARQUEZ Perdue  "

## 2019-04-26 NOTE — PROGRESS NOTES
SUBJECTIVE:   Nasreen Galarza is a 63 year old female who presents to clinic today for the following   health issues:      Migraine Follow-Up    Headaches symptoms:  Worsened more intense and more frequently     Frequency: 4-5 a week      Duration of headaches: about 8 hours     Able to do normal daily activities/work with migraines: No     Rescue/Relief medication:ibuprofen (Advil, Motrin) and Maxalt              Effectiveness: no relief    Preventative medication: None    Neurologic complications: No new stroke-like symptoms, loss of vision or speech, numbness or weakness    In the past 4 weeks, how often have you gone to Urgent Care or the emergency room because of your headaches?  0      Amount of exercise or physical activity: walking 2-3 days/week for an average of 30-45 minutes    Problems taking medications regularly: No    Medication side effects: none    Diet: regular (no restrictions)  The patient has a long history of migraines.  Over the past 45 weeks she noticed that the frequency of migraines are increasing.  She denies any numbness or weakness.  Denies any intractable headaches.  Also, the patient desires to have her immunity status checked.  She recalls having immunizations in childhood but does not have any documentation.    She is currently taking low-dose Paxil for intermittent hot flashes.    Additional history: as documented    Reviewed  and updated as needed this visit by clinical staff  Tobacco  Allergies  Meds         Reviewed and updated as needed this visit by Provider         Patient Active Problem List   Diagnosis     Migraine with aura and without status migrainosus, not intractable     Hypothyroidism, unspecified type     Class 2 obesity due to excess calories without serious comorbidity with body mass index (BMI) of 39.0 to 39.9 in adult     Menopausal syndrome (hot flashes)     HSV (herpes simplex virus) infection     Elevated blood pressure reading without diagnosis of  "hypertension     Past Surgical History:   Procedure Laterality Date     ABDOMEN SURGERY  2008    Lap band     ARTHROPLASTY HIP  2011    L hip replacement     CHOLECYSTECTOMY  1996     ORTHOPEDIC SURGERY      L4-L5 diskectomy '       Social History     Tobacco Use     Smoking status: Former Smoker     Packs/day: 2.00     Years: 18.00     Pack years: 36.00     Types: Cigarettes     Start date: 1970     Last attempt to quit: 10/18/1988     Years since quittin.5     Smokeless tobacco: Never Used   Substance Use Topics     Alcohol use: Yes     Comment: occ 1x/month     Family History   Adopted: Yes   Problem Relation Age of Onset     Unknown/Adopted No family hx of      Glaucoma No family hx of      Macular Degeneration No family hx of            ROS:  Constitutional, HEENT, cardiovascular, pulmonary, gi and gu systems are negative, except as otherwise noted.    OBJECTIVE:     /79   Pulse 71   Temp 98.1  F (36.7  C) (Oral)   Ht 1.626 m (5' 4\")   Wt 105.1 kg (231 lb 11.2 oz)   SpO2 95%   BMI 39.77 kg/m    Body mass index is 39.77 kg/m .  GENERAL: healthy, alert and no distress  EYES: Eyes grossly normal to inspection, PERRL and conjunctivae and sclerae normal  RESP: lungs clear to auscultation - no rales, rhonchi or wheezes  CV: regular rate and rhythm, normal S1 S2, no S3 or S4, no murmur, click or rub, no peripheral edema and peripheral pulses strong  NEURO: Normal strength and tone, mentation intact and speech normal    Diagnostic Test Results:  Results for orders placed or performed in visit on 19   Rubella Antibody IgG Quantitative   Result Value Ref Range    Rubella Antibody IgG Quantitative >250 IU/mL   Mumps Antibody IgG   Result Value Ref Range    Mumps Antibody IgG 2.1 (H) 0.0 - 0.8 AI   Rubeola Antibody IgG   Result Value Ref Range    Rubeola (Measles) Antibody IgG <0.2 0.0 - 0.8 AI       ASSESSMENT/PLAN:       ICD-10-CM    1. Migraine with aura and without status migrainosus, " not intractable G43.109 atenolol (TENORMIN) 25 MG tablet   2. Immunity status testing Z01.84 Rubella Antibody IgG Quantitative     Mumps Antibody IgG     Rubeola Antibody IgG     ADMIN 1st VACCINE   3. Menopausal syndrome (hot flashes) N95.1      Multiple medication options were discussed with the patient.  She was previously taking Topamax for migraines but did not find any significant improvement.  She is currently taking Maxalt but it has not been very effective.  Several medication options were discussed with the patient.  She is currently taking Paxil for menopausal symptoms which prevented us from using an SSRI for migraines.    Return to clinic if symptoms fails to improve.    Noemi Mayer MD  [facility]

## 2019-04-27 LAB
MEV IGG SER QL IA: <0.2 AI (ref 0–0.8)
MUV IGG SER QL IA: 2.1 AI (ref 0–0.8)
RUBV IGG SERPL IA-ACNC: >250 IU/ML

## 2019-05-20 DIAGNOSIS — E03.4 HYPOTHYROIDISM DUE TO ACQUIRED ATROPHY OF THYROID: ICD-10-CM

## 2019-05-20 RX ORDER — LEVOTHYROXINE SODIUM 137 UG/1
137 TABLET ORAL DAILY
Qty: 90 TABLET | Refills: 1 | Status: SHIPPED | OUTPATIENT
Start: 2019-05-20 | End: 2019-09-18

## 2019-05-20 NOTE — TELEPHONE ENCOUNTER
"Prescription approved per [facility] Refill Protocol.  Allie ANSARI RN    Last Written Prescription Date:  11/8/2018  Last Fill Quantity: 90,  # refills: 1   Last office visit: 4/26/2019 with prescribing provider:     Future Office Visit:    Requested Prescriptions   Pending Prescriptions Disp Refills     levothyroxine (SYNTHROID/LEVOTHROID) 137 MCG tablet 90 tablet 1     Sig: Take 1 tablet (137 mcg) by mouth daily       Thyroid Protocol Passed - 5/20/2019  1:49 PM        Passed - Patient is 12 years or older        Passed - Recent (12 mo) or future (30 days) visit within the authorizing provider's specialty     Patient had office visit in the last 12 months or has a visit in the next 30 days with authorizing provider or within the authorizing provider's specialty.  See \"Patient Info\" tab in inbasket, or \"Choose Columns\" in Meds & Orders section of the refill encounter.              Passed - Medication is active on med list        Passed - Normal TSH on file in past 12 months     Recent Labs   Lab Test 11/07/18  1251   TSH 3.24              Passed - No active pregnancy on record     If patient is pregnant or has had a positive pregnancy test, please check TSH.          Passed - No positive pregnancy test in past 12 months     If patient is pregnant or has had a positive pregnancy test, please check TSH.            "

## 2019-05-22 ENCOUNTER — MEDICAL CORRESPONDENCE (OUTPATIENT)
Dept: HEALTH INFORMATION MANAGEMENT | Facility: CLINIC | Age: 64
End: 2019-05-22

## 2019-05-24 ENCOUNTER — ALLIED HEALTH/NURSE VISIT (OUTPATIENT)
Dept: NURSING | Facility: CLINIC | Age: 64
End: 2019-05-24
Payer: COMMERCIAL

## 2019-05-24 DIAGNOSIS — N95.1 MENOPAUSAL SYNDROME (HOT FLASHES): ICD-10-CM

## 2019-05-24 DIAGNOSIS — Z23 NEED FOR MMR VACCINE: Primary | ICD-10-CM

## 2019-05-24 PROCEDURE — 99207 ZZC NO CHARGE NURSE ONLY: CPT

## 2019-05-24 PROCEDURE — 90471 IMMUNIZATION ADMIN: CPT

## 2019-05-24 PROCEDURE — 90707 MMR VACCINE SC: CPT

## 2019-05-24 NOTE — TELEPHONE ENCOUNTER
Last Written Prescription Date:  6/11/2018  Last Fill Quantity: 90,  # refills: 3   Last office visit: No previous visit found with prescribing provider:     Future Office Visit:  Requested Prescriptions   Pending Prescriptions Disp Refills     PARoxetine (PAXIL) 10 MG tablet 90 tablet 3     Sig: Take 1 tablet (10 mg) by mouth At Bedtime       There is no refill protocol information for this order

## 2019-05-24 NOTE — TELEPHONE ENCOUNTER
Patient here today for nurse only appt to have MMR booster and during visit stated that she needs refill on Paroxetine 10mg. Please advise.    Thanks!  MARQUEZ Perdue

## 2019-05-24 NOTE — TELEPHONE ENCOUNTER
Received refill request for paroxetine from Dr. Castañeda.  Last in clinic 4/2018.    Spoke with Nasreen and informed her of refill request and that she is due for annual exam with Dr. Castañeda. She stated she is at her primary's office and will get refill from them ([facility]).    Nurse agreed with plan.

## 2019-05-24 NOTE — NURSING NOTE
Chief Complaint   Patient presents with     Imm/Inj     MMR Booster     Screening Questionnaire for Adult Immunization    Are you sick today?   No   Do you have allergies to medications, food, a vaccine component or latex?   No   Have you ever had a serious reaction after receiving a vaccination?   No   Do you have a long-term health problem with heart disease, lung disease, asthma, kidney disease, metabolic disease (e.g. diabetes), anemia, or other blood disorder?   No   Do you have cancer, leukemia, HIV/AIDS, or any other immune system problem?   No   In the past 3 months, have you taken medications that affect  your immune system, such as prednisone, other steroids, or anticancer drugs; drugs for the treatment of rheumatoid arthritis, Crohn s disease, or psoriasis; or have you had radiation treatments?   No   Have you had a seizure, or a brain or other nervous system problem?   No   During the past year, have you received a transfusion of blood or blood     products, or been given immune (gamma) globulin or antiviral drug?   No   For women: Are you pregnant or is there a chance you could become        pregnant during the next month?   No   Have you received any vaccinations in the past 4 weeks?   No     Immunization questionnaire answers were all negative.        Per orders of Dr. CARTER, injection of MMR Booster given by Mariel Almeida. Patient instructed to remain in clinic for 15 minutes afterwards, and to report any adverse reaction to me immediately.       Screening performed by Mariel Almeida on 5/24/2019 at 11:45 AM.   MARQUEZ Perdue

## 2019-05-28 NOTE — TELEPHONE ENCOUNTER
Routing refill request to provider for review/approval because:  Last Rx from former PCP Dr Garry ANSARI, RN

## 2019-05-29 RX ORDER — PAROXETINE 10 MG/1
10 TABLET, FILM COATED ORAL AT BEDTIME
Qty: 90 TABLET | Refills: 3 | Status: SHIPPED | OUTPATIENT
Start: 2019-05-29 | End: 2020-05-12

## 2019-05-31 ENCOUNTER — OFFICE VISIT (OUTPATIENT)
Dept: FAMILY MEDICINE | Facility: CLINIC | Age: 64
End: 2019-05-31
Payer: COMMERCIAL

## 2019-05-31 VITALS
TEMPERATURE: 98.3 F | DIASTOLIC BLOOD PRESSURE: 79 MMHG | HEART RATE: 78 BPM | BODY MASS INDEX: 38.84 KG/M2 | SYSTOLIC BLOOD PRESSURE: 122 MMHG | HEIGHT: 64 IN | WEIGHT: 227.5 LBS | OXYGEN SATURATION: 96 %

## 2019-05-31 DIAGNOSIS — H69.91 DYSFUNCTION OF RIGHT EUSTACHIAN TUBE: Primary | ICD-10-CM

## 2019-05-31 PROCEDURE — 99213 OFFICE O/P EST LOW 20 MIN: CPT | Performed by: FAMILY MEDICINE

## 2019-05-31 RX ORDER — BUTALBITAL, ASPIRIN, AND CAFFEINE 325; 50; 40 MG/1; MG/1; MG/1
1 CAPSULE ORAL EVERY 4 HOURS PRN
COMMUNITY
End: 2022-10-03

## 2019-05-31 ASSESSMENT — MIFFLIN-ST. JEOR: SCORE: 1571.93

## 2019-05-31 NOTE — PROGRESS NOTES
Subjective     Nasreen Galarza is a 63 year old female who presents to clinic today for the following health issues:    HPI   Ear problem      Duration: 2 weeks    Description (location/character/radiation): clicking on R ear, x 2 weeks, pt has tried ear lavage, and tweezers.  Feels like something in ear, now having pain down R side of neck    Intensity:  moderate    Accompanying signs and symptoms: as above, denies any sinus or URI sx    History (similar episodes/previous evaluation): yes, but was always stray hair after haircut    Precipitating or alleviating factors: None    Therapies tried and outcome: ear lavage, tweezers     Over the weekend it was severe.  The patient feels that there is a piece of hair inside her ear.  Continues to have persistent symptoms.    Patient Active Problem List   Diagnosis     Migraine with aura and without status migrainosus, not intractable     Hypothyroidism, unspecified type     Class 2 obesity due to excess calories without serious comorbidity with body mass index (BMI) of 39.0 to 39.9 in adult     Menopausal syndrome (hot flashes)     HSV (herpes simplex virus) infection     Elevated blood pressure reading without diagnosis of hypertension     Past Surgical History:   Procedure Laterality Date     ABDOMEN SURGERY      Lap band     ARTHROPLASTY HIP  2011    L hip replacement     CHOLECYSTECTOMY       ORTHOPEDIC SURGERY      L4-L5 diskectomy '       Social History     Tobacco Use     Smoking status: Former Smoker     Packs/day: 2.00     Years: 18.00     Pack years: 36.00     Types: Cigarettes     Start date: 1970     Last attempt to quit: 10/18/1988     Years since quittin.6     Smokeless tobacco: Never Used   Substance Use Topics     Alcohol use: Yes     Comment: occ 1x/month     Family History   Adopted: Yes   Problem Relation Age of Onset     Unknown/Adopted No family hx of      Glaucoma No family hx of      Macular Degeneration No family hx of             Reviewed and updated as needed this visit by Provider         Review of Systems   ROS COMP: Constitutional, HEENT, cardiovascular, pulmonary, gi and gu systems are negative, except as otherwise noted.      Objective    There were no vitals taken for this visit.  There is no height or weight on file to calculate BMI.  Physical Exam   GENERAL: healthy, alert and no distress  HENT: ear canals and TM's normal, nose and mouth without ulcers or lesions  RESP: lungs clear to auscultation - no rales, rhonchi or wheezes  CV: regular rate and rhythm, normal S1 S2, no S3 or S4, no murmur, click or rub, no peripheral edema and peripheral pulses strong  ABDOMEN: soft, nontender, no hepatosplenomegaly, no masses and bowel sounds normal    Diagnostic Test Results:  Labs reviewed in Epic        Assessment & Plan       ICD-10-CM    1. Dysfunction of right eustachian tube H69.81      Examination reveals fluid effusion in the right ear.  It also reveals 2 strands of hairs in the right ear canal.  Attempted to wash it but did not extract any hair out of the canal.  She was informed that fluid effusion of benign finding.  Return to clinic if fluid persists more than 2 months.        No follow-ups on file.    Noemi Mayer MD  [facility]

## 2019-05-31 NOTE — NURSING NOTE
"Chief Complaint   Patient presents with     Ear Problem     /79   Pulse 78   Temp 98.3  F (36.8  C) (Oral)   Ht 1.626 m (5' 4\")   Wt 103.2 kg (227 lb 8 oz)   SpO2 96%   BMI 39.05 kg/m   Estimated body mass index is 39.05 kg/m  as calculated from the following:    Height as of this encounter: 1.626 m (5' 4\").    Weight as of this encounter: 103.2 kg (227 lb 8 oz).        Health Maintenance due pending provider review:  NONE    n/a    Kristy Cuello CMA  "

## 2019-07-24 ENCOUNTER — E-VISIT (OUTPATIENT)
Dept: FAMILY MEDICINE | Facility: CLINIC | Age: 64
End: 2019-07-24
Payer: COMMERCIAL

## 2019-07-24 DIAGNOSIS — L71.0 PERIORAL DERMATITIS: Primary | ICD-10-CM

## 2019-07-24 PROCEDURE — 99444 ZZC PHYSICIAN ONLINE EVALUATION & MANAGEMENT SERVICE: CPT | Performed by: FAMILY MEDICINE

## 2019-07-24 RX ORDER — TACROLIMUS 1 MG/G
OINTMENT TOPICAL 2 TIMES DAILY
Qty: 30 G | Refills: 1 | Status: SHIPPED | OUTPATIENT
Start: 2019-07-24 | End: 2019-08-02

## 2019-07-26 ENCOUNTER — TELEPHONE (OUTPATIENT)
Dept: FAMILY MEDICINE | Facility: CLINIC | Age: 64
End: 2019-07-26

## 2019-07-26 NOTE — TELEPHONE ENCOUNTER
Prior Authorization Retail Medication Request    Medication/Dose: tacrolimus (PROTOPIC) 0.1 % external ointment  ICD code (if different than what is on RX):    Previously Tried and Failed: OTC cortisone cream and plain ointment to combat dryness   Rationale:      Insurance Name:[employer]    Insurance ID:  [id number]      Pharmacy Information (if different than what is on RX)  Name:  [facility]  Phone:  [phone]

## 2019-07-31 ENCOUNTER — TELEPHONE (OUTPATIENT)
Dept: FAMILY MEDICINE | Facility: CLINIC | Age: 64
End: 2019-07-31

## 2019-07-31 NOTE — TELEPHONE ENCOUNTER
Prior Authorization Retail Medication Request    Medication/Dose: pimecrolimus (ELIDEL) 1 % external cream  ICD code (if different than what is on RX):    Previously Tried and Failed:  OTC cortisone cream and plain ointment to combat dryness  Rationale:      Insurance Name: [employer]   Insurance ID: [id number]        Pharmacy Information (if different than what is on RX)  Name:  [facility]  Phone:  [phone]

## 2019-08-06 NOTE — TELEPHONE ENCOUNTER
Central Prior Authorization Team  Phone: [phone]    PA Initiation    Medication: tacrolimus (PROTOPIC) 0.1 % external ointment  Insurance Company: [employer] - Phone [phone] Fax [phone]  Pharmacy Filling the Rx: [facility][id number] [address] AT [facility]  Filling Pharmacy Phone: [phone]  Filling Pharmacy Fax:    Start Date: 8/6/2019

## 2019-08-07 ENCOUNTER — ANCILLARY PROCEDURE (OUTPATIENT)
Dept: MAMMOGRAPHY | Facility: CLINIC | Age: 64
End: 2019-08-07
Payer: COMMERCIAL

## 2019-08-07 DIAGNOSIS — Z12.31 VISIT FOR SCREENING MAMMOGRAM: ICD-10-CM

## 2019-08-07 PROCEDURE — 77067 SCR MAMMO BI INCL CAD: CPT | Mod: TC

## 2019-08-07 PROCEDURE — 77063 BREAST TOMOSYNTHESIS BI: CPT | Mod: TC

## 2019-08-08 NOTE — TELEPHONE ENCOUNTER
Prior Authorization Approval    Authorization Effective Date: 7/27/2019  Authorization Expiration Date: 7/27/2020  Medication: tacrolimus (PROTOPIC) 0.1 % external ointment- APPROVED  Approved Dose/Quantity:   Reference #:     Insurance Company: [employer] - Phone [phone] Fax [phone]  Expected CoPay:       CoPay Card Available:      Foundation Assistance Needed:    Which Pharmacy is filling the prescription (Not needed for infusion/clinic administered): [id number] [address] AT [facility]  Pharmacy Notified: Yes  Patient Notified: Comment:  **Instructed pharmacy to notify patient when script is ready to /ship.**

## 2019-08-09 NOTE — TELEPHONE ENCOUNTER
Central Prior Authorization Team   Phone: [phone]      PA Initiation    Medication: pimecrolimus (ELIDEL) 1 % external cream-Initiated  Insurance Company: [employer] Clinical [employer] - Phone [phone] Fax [phone]  Pharmacy Filling the Rx: [facility][id number] [address] AT [facility]  Filling Pharmacy Phone: [phone]  Filling Pharmacy Fax:    Start Date: 8/9/2019

## 2019-08-12 NOTE — TELEPHONE ENCOUNTER
Prior Authorization Approval    Authorization Effective Date: 7/27/2019  Authorization Expiration Date: 7/27/2020  Medication: pimecrolimus (ELIDEL) 1 % external cream-APPROVED  Approved Dose/Quantity:   Reference #:     Insurance Company: [employer] Clinical Review - Phone [phone] Fax [phone]  Expected CoPay:       CoPay Card Available:      Foundation Assistance Needed:    Which Pharmacy is filling the prescription (Not needed for infusion/clinic administered): [facility][id number] [address] AT [facility]  Pharmacy Notified: Yes  Patient Notified: Yes      Called and got verbal approval, patient has picked up the medication on 8/8/19.

## 2019-08-14 DIAGNOSIS — E78.00 HYPERCHOLESTEROLEMIA: ICD-10-CM

## 2019-08-14 RX ORDER — ATORVASTATIN CALCIUM 10 MG/1
80 TABLET, FILM COATED ORAL DAILY
Qty: 240 TABLET | Refills: 0 | Status: SHIPPED | OUTPATIENT
Start: 2019-08-14 | End: 2019-08-19

## 2019-08-14 NOTE — TELEPHONE ENCOUNTER
Received refill request for atorvastatin.  Last in clinic 4/2018.    Tried to reach Nasreen but received voicemail.  Left message that refill request was received and a one month supply can be sent to pharmacy but office visit is due for further refills.   It looks like you have seen Dr. Mayer more recently - they may be able to do long-term refill but if you want it from Dr. Castañeda, will need office visit.  Please call [phone] to schedule.

## 2019-08-15 ENCOUNTER — MYC REFILL (OUTPATIENT)
Dept: OBGYN | Facility: CLINIC | Age: 64
End: 2019-08-15

## 2019-08-15 ENCOUNTER — MYC MEDICAL ADVICE (OUTPATIENT)
Dept: FAMILY MEDICINE | Facility: CLINIC | Age: 64
End: 2019-08-15

## 2019-08-15 DIAGNOSIS — E78.00 HYPERCHOLESTEROLEMIA: ICD-10-CM

## 2019-08-15 RX ORDER — ATORVASTATIN CALCIUM 10 MG/1
80 TABLET, FILM COATED ORAL DAILY
Qty: 240 TABLET | Refills: 0 | Status: CANCELLED | OUTPATIENT
Start: 2019-08-15

## 2019-08-19 RX ORDER — ATORVASTATIN CALCIUM 10 MG/1
80 TABLET, FILM COATED ORAL DAILY
Qty: 256 TABLET | Refills: 0 | Status: SHIPPED | OUTPATIENT
Start: 2019-08-19 | End: 2019-09-10

## 2019-08-20 ENCOUNTER — MYC MEDICAL ADVICE (OUTPATIENT)
Dept: FAMILY MEDICINE | Facility: CLINIC | Age: 64
End: 2019-08-20

## 2019-09-05 ENCOUNTER — MYC MEDICAL ADVICE (OUTPATIENT)
Dept: FAMILY MEDICINE | Facility: CLINIC | Age: 64
End: 2019-09-05

## 2019-09-05 DIAGNOSIS — E03.9 HYPOTHYROIDISM, UNSPECIFIED TYPE: ICD-10-CM

## 2019-09-05 DIAGNOSIS — E78.5 HYPERLIPIDEMIA LDL GOAL <100: Primary | ICD-10-CM

## 2019-09-10 ENCOUNTER — TELEPHONE (OUTPATIENT)
Dept: OBGYN | Facility: CLINIC | Age: 64
End: 2019-09-10

## 2019-09-10 DIAGNOSIS — E78.00 HYPERCHOLESTEROLEMIA: ICD-10-CM

## 2019-09-10 RX ORDER — ATORVASTATIN CALCIUM 80 MG/1
80 TABLET, FILM COATED ORAL DAILY
Qty: 90 TABLET | Refills: 0 | Status: SHIPPED | OUTPATIENT
Start: 2019-09-10 | End: 2020-08-31

## 2019-09-10 NOTE — TELEPHONE ENCOUNTER
[employer] requesting Atorvastatinbe switched to higher dose daily instead of taking 8-10 mg tabs. Approved by Dr Castañeda. New prescription sent

## 2019-09-10 NOTE — TELEPHONE ENCOUNTER
FS    Refill request for Atrovastatin  Patient coming in for fasting labs on 9/13  Last done 5/29/18    Patient currently has enough medication until next week.  Please sign Lipid orders and route back to triage so we can put the call into our refill basket.    Thanks,  Val Arrington RN

## 2019-09-10 NOTE — TELEPHONE ENCOUNTER
FS:  Addendum to note below:    Patient would also like her TSH checked.  States she has been feeling fatigued lately.    Thanks,  Val Arrington RN

## 2019-09-11 RX ORDER — ATORVASTATIN CALCIUM 80 MG/1
80 TABLET, FILM COATED ORAL DAILY
Qty: 90 TABLET | Refills: 0 | Status: SHIPPED | OUTPATIENT
Start: 2019-09-11 | End: 2019-12-02

## 2019-09-13 ENCOUNTER — TRANSFERRED RECORDS (OUTPATIENT)
Dept: HEALTH INFORMATION MANAGEMENT | Facility: CLINIC | Age: 64
End: 2019-09-13

## 2019-09-13 DIAGNOSIS — E78.5 HYPERLIPIDEMIA LDL GOAL <100: ICD-10-CM

## 2019-09-13 DIAGNOSIS — E03.9 HYPOTHYROIDISM, UNSPECIFIED TYPE: ICD-10-CM

## 2019-09-13 LAB
CHOLEST SERPL-MCNC: 180 MG/DL
HDLC SERPL-MCNC: 50 MG/DL
LDLC SERPL CALC-MCNC: 111 MG/DL
NONHDLC SERPL-MCNC: 130 MG/DL
T4 FREE SERPL-MCNC: 1.02 NG/DL (ref 0.76–1.46)
TRIGL SERPL-MCNC: 93 MG/DL
TSH SERPL DL<=0.005 MIU/L-ACNC: 9.86 MU/L (ref 0.4–4)

## 2019-09-13 PROCEDURE — 84439 ASSAY OF FREE THYROXINE: CPT | Performed by: FAMILY MEDICINE

## 2019-09-13 PROCEDURE — 80061 LIPID PANEL: CPT | Performed by: FAMILY MEDICINE

## 2019-09-13 PROCEDURE — 36415 COLL VENOUS BLD VENIPUNCTURE: CPT | Performed by: FAMILY MEDICINE

## 2019-09-13 PROCEDURE — 84443 ASSAY THYROID STIM HORMONE: CPT | Performed by: FAMILY MEDICINE

## 2019-09-16 ENCOUNTER — MYC MEDICAL ADVICE (OUTPATIENT)
Dept: FAMILY MEDICINE | Facility: CLINIC | Age: 64
End: 2019-09-16

## 2019-09-16 DIAGNOSIS — E03.9 HYPOTHYROIDISM, UNSPECIFIED TYPE: Primary | ICD-10-CM

## 2019-09-18 RX ORDER — LEVOTHYROXINE SODIUM 150 UG/1
150 TABLET ORAL DAILY
Qty: 60 TABLET | Refills: 0 | Status: SHIPPED | OUTPATIENT
Start: 2019-09-18 | End: 2019-10-24

## 2019-09-18 NOTE — TELEPHONE ENCOUNTER
Your TSH is significantly elevated.   I increased your Levothyroxine from 137 mcg to 150 mcg. I would recommend rechecking your TSH in 6 weeks.     Leyla

## 2019-10-21 DIAGNOSIS — B00.9 HSV (HERPES SIMPLEX VIRUS) INFECTION: ICD-10-CM

## 2019-10-21 NOTE — TELEPHONE ENCOUNTER
"valACYclovir (VALTREX) 500 MG tablet  Last Written Prescription Date:  08/30/2018  Last Fill Quantity: 6,  # refills: 3   Last office visit: 5/31/2019 with prescribing provider:  Yes    Future Office Visit:   Next 5 appointments (look out 90 days)    Oct 22, 2019  9:15 AM CDT  Lab visit with [facility]  [facility] ([facility]) [address] [facility] [id number] [phone]         Requested Prescriptions   Pending Prescriptions Disp Refills     valACYclovir (VALTREX) 500 MG tablet 6 tablet 3     Sig: Take 1 tablet (500 mg) by mouth 2 times daily 3 days only.       Antivirals for Herpes Protocol Failed - 10/21/2019  2:41 PM        Failed - Normal serum creatinine on file in past 12 months     Recent Labs   Lab Test 05/29/18  0844   CR 0.84             Passed - Patient is age 12 or older        Passed - Recent (12 mo) or future (30 days) visit within the authorizing provider's specialty     Patient has had an office visit with the authorizing provider or a provider within the authorizing providers department within the previous 12 mos or has a future within next 30 days. See \"Patient Info\" tab in inbasket, or \"Choose Columns\" in Meds & Orders section of the refill encounter.              Passed - Medication is active on med list          "

## 2019-10-22 DIAGNOSIS — E03.9 HYPOTHYROIDISM, UNSPECIFIED TYPE: ICD-10-CM

## 2019-10-22 LAB — TSH SERPL DL<=0.005 MIU/L-ACNC: 2.4 MU/L (ref 0.4–4)

## 2019-10-22 PROCEDURE — 84443 ASSAY THYROID STIM HORMONE: CPT | Performed by: FAMILY MEDICINE

## 2019-10-22 PROCEDURE — 36415 COLL VENOUS BLD VENIPUNCTURE: CPT | Performed by: FAMILY MEDICINE

## 2019-10-22 RX ORDER — VALACYCLOVIR HYDROCHLORIDE 500 MG/1
500 TABLET, FILM COATED ORAL 2 TIMES DAILY
Qty: 6 TABLET | Refills: 1 | Status: SHIPPED | OUTPATIENT
Start: 2019-10-22 | End: 2022-08-02

## 2019-10-23 ENCOUNTER — MYC MEDICAL ADVICE (OUTPATIENT)
Dept: FAMILY MEDICINE | Facility: CLINIC | Age: 64
End: 2019-10-23

## 2019-10-23 DIAGNOSIS — E03.9 HYPOTHYROIDISM, UNSPECIFIED TYPE: ICD-10-CM

## 2019-10-28 RX ORDER — LEVOTHYROXINE SODIUM 150 UG/1
150 TABLET ORAL DAILY
Qty: 90 TABLET | Refills: 3 | Status: SHIPPED | OUTPATIENT
Start: 2019-10-28 | End: 2020-08-14

## 2019-11-30 DIAGNOSIS — E78.00 HYPERCHOLESTEROLEMIA: ICD-10-CM

## 2019-12-02 ENCOUNTER — MYC REFILL (OUTPATIENT)
Dept: FAMILY MEDICINE | Facility: CLINIC | Age: 64
End: 2019-12-02

## 2019-12-02 DIAGNOSIS — E78.5 HYPERLIPIDEMIA LDL GOAL <100: ICD-10-CM

## 2019-12-03 RX ORDER — ATORVASTATIN CALCIUM 80 MG/1
80 TABLET, FILM COATED ORAL DAILY
Qty: 90 TABLET | Refills: 0 | Status: SHIPPED | OUTPATIENT
Start: 2019-12-03 | End: 2020-02-25

## 2019-12-03 NOTE — TELEPHONE ENCOUNTER
"Prescription approved per [facility] Refill Protocol.  Allie ANSARI RN    Last Written Prescription Date:  9/11/2019  Last Fill Quantity: 90,  # refills: 0   Last office visit: 5/31/2019 with prescribing provider:     Future Office Visit:    Requested Prescriptions   Pending Prescriptions Disp Refills     atorvastatin (LIPITOR) 80 MG tablet 90 tablet 0     Sig: Take 1 tablet (80 mg) by mouth daily       Statins Protocol Passed - 12/2/2019 11:42 AM        Passed - LDL on file in past 12 months     Recent Labs   Lab Test 09/13/19  0804   *             Passed - No abnormal creatine kinase in past 12 months     No lab results found.             Passed - Recent (12 mo) or future (30 days) visit within the authorizing provider's specialty     Patient has had an office visit with the authorizing provider or a provider within the authorizing providers department within the previous 12 mos or has a future within next 30 days. See \"Patient Info\" tab in inbasket, or \"Choose Columns\" in Meds & Orders section of the refill encounter.              Passed - Medication is active on med list        Passed - Patient is age 18 or older        Passed - No active pregnancy on record        Passed - No positive pregnancy test in past 12 months        "

## 2019-12-04 RX ORDER — ATORVASTATIN CALCIUM 80 MG/1
TABLET, FILM COATED ORAL
Qty: 90 TABLET | Refills: 0 | OUTPATIENT
Start: 2019-12-04

## 2020-01-22 ENCOUNTER — MYC MEDICAL ADVICE (OUTPATIENT)
Dept: FAMILY MEDICINE | Facility: CLINIC | Age: 65
End: 2020-01-22

## 2020-01-24 ENCOUNTER — OFFICE VISIT (OUTPATIENT)
Dept: FAMILY MEDICINE | Facility: CLINIC | Age: 65
End: 2020-01-24
Payer: COMMERCIAL

## 2020-01-24 VITALS
HEIGHT: 64 IN | SYSTOLIC BLOOD PRESSURE: 126 MMHG | TEMPERATURE: 98.1 F | BODY MASS INDEX: 39.09 KG/M2 | RESPIRATION RATE: 16 BRPM | WEIGHT: 229 LBS | HEART RATE: 66 BPM | OXYGEN SATURATION: 95 % | DIASTOLIC BLOOD PRESSURE: 86 MMHG

## 2020-01-24 DIAGNOSIS — Z00.00 ROUTINE GENERAL MEDICAL EXAMINATION AT A HEALTH CARE FACILITY: Primary | ICD-10-CM

## 2020-01-24 LAB
ANION GAP SERPL CALCULATED.3IONS-SCNC: 6 MMOL/L (ref 3–14)
BUN SERPL-MCNC: 16 MG/DL (ref 7–30)
CALCIUM SERPL-MCNC: 9.7 MG/DL (ref 8.5–10.1)
CHLORIDE SERPL-SCNC: 105 MMOL/L (ref 94–109)
CO2 SERPL-SCNC: 27 MMOL/L (ref 20–32)
CREAT SERPL-MCNC: 0.86 MG/DL (ref 0.52–1.04)
GFR SERPL CREATININE-BSD FRML MDRD: 71 ML/MIN/{1.73_M2}
GLUCOSE SERPL-MCNC: 92 MG/DL (ref 70–99)
HBA1C MFR BLD: 5.4 % (ref 0–5.6)
POTASSIUM SERPL-SCNC: 4.4 MMOL/L (ref 3.4–5.3)
SODIUM SERPL-SCNC: 138 MMOL/L (ref 133–144)

## 2020-01-24 PROCEDURE — 36415 COLL VENOUS BLD VENIPUNCTURE: CPT | Performed by: FAMILY MEDICINE

## 2020-01-24 PROCEDURE — 80048 BASIC METABOLIC PNL TOTAL CA: CPT | Performed by: FAMILY MEDICINE

## 2020-01-24 PROCEDURE — 83036 HEMOGLOBIN GLYCOSYLATED A1C: CPT | Performed by: FAMILY MEDICINE

## 2020-01-24 PROCEDURE — 99396 PREV VISIT EST AGE 40-64: CPT | Performed by: FAMILY MEDICINE

## 2020-01-24 ASSESSMENT — MIFFLIN-ST. JEOR: SCORE: 1573.74

## 2020-01-24 NOTE — PROGRESS NOTES
SUBJECTIVE:   CC: Nasreen Galarza is an 64 year old woman who presents for preventive health visit.     Healthy Habits:     Getting at least 3 servings of Calcium per day:  NO    Bi-annual eye exam:  Yes    Dental care twice a year:  Yes    Sleep apnea or symptoms of sleep apnea:  None    Diet:  Regular (no restrictions)    Duration of exercise:  Less than 15 minutes    Taking medications regularly:  Yes    Medication side effects:  None    PHQ-2 Total Score: 1    Additional concerns today:  No    Today's PHQ-2 Score:   PHQ-2 (  [employer]) 2020   Q1: Little interest or pleasure in doing things 0   Q2: Feeling down, depressed or hopeless 1   PHQ-2 Score 1   Q1: Little interest or pleasure in doing things Not at all   Q2: Feeling down, depressed or hopeless Several days   PHQ-2 Score 1       Abuse: Current or Past(Physical, Sexual or Emotional)- No  Do you feel safe in your environment? Yes        Social History     Tobacco Use     Smoking status: Former Smoker     Packs/day: 2.00     Years: 18.00     Pack years: 36.00     Types: Cigarettes     Start date: 1970     Last attempt to quit: 10/18/1988     Years since quittin.3     Smokeless tobacco: Never Used   Substance Use Topics     Alcohol use: Yes     Comment: occ 1x/month     If you drink alcohol do you typically have >3 drinks per day or >7 drinks per week? No    Alcohol Use 2020   Prescreen: >3 drinks/day or >7 drinks/week? No   Prescreen: >3 drinks/day or >7 drinks/week? -   No flowsheet data found.    Reviewed orders with patient.  Reviewed health maintenance and updated orders accordingly - Yes  Lab work is in process    Mammogram Screening: Patient over age 50, mutual decision to screen reflected in health maintenance.    Pertinent mammograms are reviewed under the imaging tab.  History of abnormal Pap smear: NO - age 30-65 PAP every 5 years with negative HPV co-testing recommended  PAP / HPV Latest Ref Rng & Units 2018   PAP - NIL  "  HPV 16 DNA NEG:Negative Negative   HPV 18 DNA NEG:Negative Negative   OTHER HR HPV NEG:Negative Negative     Reviewed and updated as needed this visit by clinical staff  Tobacco  Allergies  Meds  Med Hx  Surg Hx  Fam Hx  Soc Hx        Reviewed and updated as needed this visit by Provider            Review of Systems  CONSTITUTIONAL: NEGATIVE for fever, chills, change in weight  INTEGUMENTARU/SKIN: NEGATIVE for worrisome rashes, moles or lesions  EYES: NEGATIVE for vision changes or irritation  ENT: NEGATIVE for ear, mouth and throat problems  RESP: NEGATIVE for significant cough or SOB  BREAST: NEGATIVE for masses, tenderness or discharge  CV: NEGATIVE for chest pain, palpitations or peripheral edema  GI: NEGATIVE for nausea, abdominal pain, heartburn, or change in bowel habits  : NEGATIVE for unusual urinary or vaginal symptoms. Periods are regular.  MUSCULOSKELETAL: NEGATIVE for significant arthralgias or myalgia  NEURO: NEGATIVE for weakness, dizziness or paresthesias  PSYCHIATRIC: NEGATIVE for changes in mood or affect     OBJECTIVE:   /86   Pulse 66   Temp 98.1  F (36.7  C) (Oral)   Resp 16   Ht 1.626 m (5' 4\")   Wt 103.9 kg (229 lb)   SpO2 95%   BMI 39.31 kg/m    Physical Exam  GENERAL APPEARANCE: healthy, alert and no distress  EYES: Eyes grossly normal to inspection, PERRL and conjunctivae and sclerae normal  HENT: ear canals and TM's normal, nose and mouth without ulcers or lesions, oropharynx clear and oral mucous membranes moist  NECK: no adenopathy, no asymmetry, masses, or scars and thyroid normal to palpation  RESP: lungs clear to auscultation - no rales, rhonchi or wheezes  BREAST: normal without masses, tenderness or nipple discharge and no palpable axillary masses or adenopathy  CV: regular rate and rhythm, normal S1 S2, no S3 or S4, no murmur, click or rub, no peripheral edema and peripheral pulses strong  ABDOMEN: soft, nontender, no hepatosplenomegaly, no masses and bowel " "sounds normal  MS: no musculoskeletal defects are noted and gait is age appropriate without ataxia  SKIN: no suspicious lesions or rashes  NEURO: Normal strength and tone, sensory exam grossly normal, mentation intact and speech normal  PSYCH: mentation appears normal and affect normal/bright    Diagnostic Test Results:  Labs reviewed in Epic  Results for orders placed or performed in visit on 01/24/20   Hemoglobin A1c     Status: None   Result Value Ref Range    Hemoglobin A1C 5.4 0 - 5.6 %   Basic metabolic panel  (Ca, Cl, CO2, Creat, Gluc, K, Na, BUN)     Status: None   Result Value Ref Range    Sodium 138 133 - 144 mmol/L    Potassium 4.4 3.4 - 5.3 mmol/L    Chloride 105 94 - 109 mmol/L    Carbon Dioxide 27 20 - 32 mmol/L    Anion Gap 6 3 - 14 mmol/L    Glucose 92 70 - 99 mg/dL    Urea Nitrogen 16 7 - 30 mg/dL    Creatinine 0.86 0.52 - 1.04 mg/dL    GFR Estimate 71 >60 mL/min/[1.73_m2]    GFR Estimate If Black 82 >60 mL/min/[1.73_m2]    Calcium 9.7 8.5 - 10.1 mg/dL       ASSESSMENT/PLAN:       ICD-10-CM    1. Routine general medical examination at a health care facility Z00.00 Hemoglobin A1c     Basic metabolic panel  (Ca, Cl, CO2, Creat, Gluc, K, Na, BUN)       COUNSELING:  Reviewed preventive health counseling, as reflected in patient instructions       Regular exercise       Healthy diet/nutrition       Vision screening    Estimated body mass index is 39.31 kg/m  as calculated from the following:    Height as of this encounter: 1.626 m (5' 4\").    Weight as of this encounter: 103.9 kg (229 lb).         reports that she quit smoking about 31 years ago. Her smoking use included cigarettes. She started smoking about 50 years ago. She has a 36.00 pack-year smoking history. She has never used smokeless tobacco.      Counseling Resources:  ATP IV Guidelines  Pooled Cohorts Equation Calculator  Breast Cancer Risk Calculator  FRAX Risk Assessment  ICSI Preventive Guidelines  Dietary Guidelines for Americans, " 2010  USDA's MyPlate  ASA Prophylaxis  Lung CA Screening    Noemi Mayer MD  [facility]

## 2020-02-25 ENCOUNTER — MYC REFILL (OUTPATIENT)
Dept: FAMILY MEDICINE | Facility: CLINIC | Age: 65
End: 2020-02-25

## 2020-02-25 DIAGNOSIS — E78.5 HYPERLIPIDEMIA LDL GOAL <100: ICD-10-CM

## 2020-02-25 RX ORDER — ATORVASTATIN CALCIUM 80 MG/1
80 TABLET, FILM COATED ORAL DAILY
Qty: 90 TABLET | Refills: 1 | Status: SHIPPED | OUTPATIENT
Start: 2020-02-25 | End: 2020-08-14

## 2020-02-25 NOTE — TELEPHONE ENCOUNTER
"Prescription approved per [facility] Refill Protocol.  Allie ANSARI RN    Last Written Prescription Date:  12/3/2019  Last Fill Quantity: 90,  # refills: 0   Last office visit: 1/24/2020 with prescribing provider:     Future Office Visit:    Requested Prescriptions   Pending Prescriptions Disp Refills     atorvastatin (LIPITOR) 80 MG tablet 90 tablet 0     Sig: Take 1 tablet (80 mg) by mouth daily       Statins Protocol Passed - 2/25/2020 11:41 AM        Passed - LDL on file in past 12 months     Recent Labs   Lab Test 09/13/19  0804   *             Passed - No abnormal creatine kinase in past 12 months     No lab results found.             Passed - Recent (12 mo) or future (30 days) visit within the authorizing provider's specialty     Patient has had an office visit with the authorizing provider or a provider within the authorizing providers department within the previous 12 mos or has a future within next 30 days. See \"Patient Info\" tab in inbasket, or \"Choose Columns\" in Meds & Orders section of the refill encounter.              Passed - Medication is active on med list        Passed - Patient is age 18 or older        Passed - No active pregnancy on record        Passed - No positive pregnancy test in past 12 months        "

## 2020-05-12 DIAGNOSIS — N95.1 MENOPAUSAL SYNDROME (HOT FLASHES): ICD-10-CM

## 2020-05-12 RX ORDER — PAROXETINE 10 MG/1
10 TABLET, FILM COATED ORAL AT BEDTIME
Qty: 90 TABLET | Refills: 3 | Status: SHIPPED | OUTPATIENT
Start: 2020-05-12 | End: 2020-08-14

## 2020-05-12 NOTE — TELEPHONE ENCOUNTER
JF,   Please advise in FS' absence    Routing refill request to provider for review/approval because:  Drug not on the [facility] refill protocol for dx of menopausal symptoms    Allie ANSARI RN

## 2020-08-11 ENCOUNTER — HOSPITAL ENCOUNTER (OUTPATIENT)
Dept: MAMMOGRAPHY | Facility: CLINIC | Age: 65
Discharge: HOME | End: 2020-08-11
Attending: FAMILY MEDICINE | Admitting: FAMILY MEDICINE
Payer: COMMERCIAL

## 2020-08-11 DIAGNOSIS — Z12.31 VISIT FOR SCREENING MAMMOGRAM: ICD-10-CM

## 2020-08-11 PROCEDURE — 77067 SCR MAMMO BI INCL CAD: CPT

## 2020-08-14 ENCOUNTER — MYC MEDICAL ADVICE (OUTPATIENT)
Dept: FAMILY MEDICINE | Facility: CLINIC | Age: 65
End: 2020-08-14

## 2020-08-14 DIAGNOSIS — E78.5 HYPERLIPIDEMIA LDL GOAL <100: ICD-10-CM

## 2020-08-14 DIAGNOSIS — E03.9 HYPOTHYROIDISM, UNSPECIFIED TYPE: ICD-10-CM

## 2020-08-14 DIAGNOSIS — N95.1 MENOPAUSAL SYNDROME (HOT FLASHES): ICD-10-CM

## 2020-08-14 RX ORDER — LEVOTHYROXINE SODIUM 150 UG/1
150 TABLET ORAL DAILY
Qty: 90 TABLET | Refills: 1 | Status: SHIPPED | OUTPATIENT
Start: 2020-08-14 | End: 2021-02-19

## 2020-08-14 RX ORDER — PAROXETINE 10 MG/1
10 TABLET, FILM COATED ORAL AT BEDTIME
Qty: 90 TABLET | Refills: 1 | Status: SHIPPED | OUTPATIENT
Start: 2020-08-14 | End: 2021-02-19

## 2020-08-14 RX ORDER — ATORVASTATIN CALCIUM 80 MG/1
80 TABLET, FILM COATED ORAL DAILY
Qty: 90 TABLET | Refills: 1 | Status: SHIPPED | OUTPATIENT
Start: 2020-08-14 | End: 2021-02-19

## 2020-08-14 NOTE — TELEPHONE ENCOUNTER
Pharmacy change  Prescription approved per [facility] Refill Protocol.  Allie ANSARI RN

## 2020-08-31 ENCOUNTER — OFFICE VISIT (OUTPATIENT)
Dept: FAMILY MEDICINE | Facility: CLINIC | Age: 65
End: 2020-08-31
Payer: COMMERCIAL

## 2020-08-31 VITALS
WEIGHT: 233 LBS | HEIGHT: 63 IN | DIASTOLIC BLOOD PRESSURE: 83 MMHG | OXYGEN SATURATION: 97 % | HEART RATE: 88 BPM | RESPIRATION RATE: 16 BRPM | BODY MASS INDEX: 41.29 KG/M2 | SYSTOLIC BLOOD PRESSURE: 126 MMHG | TEMPERATURE: 97.1 F

## 2020-08-31 DIAGNOSIS — Z23 ENCOUNTER FOR IMMUNIZATION: ICD-10-CM

## 2020-08-31 DIAGNOSIS — Z00.00 PREVENTATIVE HEALTH CARE: Primary | ICD-10-CM

## 2020-08-31 DIAGNOSIS — E66.01 MORBID OBESITY (H): ICD-10-CM

## 2020-08-31 DIAGNOSIS — F41.0 ANXIETY ATTACK: ICD-10-CM

## 2020-08-31 LAB — HBA1C MFR BLD: 5.5 % (ref 0–5.6)

## 2020-08-31 PROCEDURE — G0402 INITIAL PREVENTIVE EXAM: HCPCS | Performed by: FAMILY MEDICINE

## 2020-08-31 PROCEDURE — G0009 ADMIN PNEUMOCOCCAL VACCINE: HCPCS | Performed by: FAMILY MEDICINE

## 2020-08-31 PROCEDURE — 85027 COMPLETE CBC AUTOMATED: CPT | Performed by: FAMILY MEDICINE

## 2020-08-31 PROCEDURE — 80061 LIPID PANEL: CPT | Performed by: FAMILY MEDICINE

## 2020-08-31 PROCEDURE — 90670 PCV13 VACCINE IM: CPT | Performed by: FAMILY MEDICINE

## 2020-08-31 PROCEDURE — 83036 HEMOGLOBIN GLYCOSYLATED A1C: CPT | Performed by: FAMILY MEDICINE

## 2020-08-31 PROCEDURE — 80053 COMPREHEN METABOLIC PANEL: CPT | Performed by: FAMILY MEDICINE

## 2020-08-31 PROCEDURE — 84443 ASSAY THYROID STIM HORMONE: CPT | Performed by: FAMILY MEDICINE

## 2020-08-31 PROCEDURE — 36415 COLL VENOUS BLD VENIPUNCTURE: CPT | Performed by: FAMILY MEDICINE

## 2020-08-31 RX ORDER — LORAZEPAM 0.5 MG/1
.25-.5 TABLET ORAL
Qty: 12 TABLET | Refills: 1 | Status: SHIPPED | OUTPATIENT
Start: 2020-08-31 | End: 2022-10-03

## 2020-08-31 ASSESSMENT — PATIENT HEALTH QUESTIONNAIRE - PHQ9: SUM OF ALL RESPONSES TO PHQ QUESTIONS 1-9: 11

## 2020-08-31 ASSESSMENT — ACTIVITIES OF DAILY LIVING (ADL): CURRENT_FUNCTION: NO ASSISTANCE NEEDED

## 2020-08-31 ASSESSMENT — MIFFLIN-ST. JEOR: SCORE: 1563.07

## 2020-08-31 NOTE — PROGRESS NOTES
"SUBJECTIVE:   Nasreen Galarza is a 65 year old female who presents for Preventive Visit.    Are you in the first 12 months of your Medicare coverage?  Yes,  Visual Acuity:  Patient had a recent eye exam.    Healthy Habits:    In general, how would you rate your overall health?  Good    Frequency of exercise:  None    Duration of exercise:  Less than 15 minutes    Do you usually eat at least 4 servings of fruit and vegetables a day, include whole grains    & fiber and avoid regularly eating high fat or \"junk\" foods?  No    Taking medications regularly:  Yes    Barriers to taking medications:  None    Medication side effects:  None    Ability to successfully perform activities of daily living:  No assistance needed    Home Safety:  No safety concerns identified    Hearing Impairment:  No hearing concerns    In the past 6 months, have you been bothered by leaking of urine?  No    In general, how would you rate your overall mental or emotional health?  Good      PHQ-2 Total Score:    Additional concerns today:  Yes (immunizations )    Do you feel safe in your environment? Yes    Have you ever done Advance Care Planning? (For example, a Health Directive, POLST, or a discussion with a medical provider or your loved ones about your wishes): No, advance care planning information given to patient to review.  Advanced care planning was discussed at today's visit.    Fall risk  Fallen 2 or more times in the past year?: No  Any fall with injury in the past year?: No    Cognitive Screening   1) Repeat 3 items (Leader, Season, Table)    2) Clock draw: NORMAL  3) 3 item recall: Recalls 2 objects   Results: NORMAL clock, 1-2 items recalled: COGNITIVE IMPAIRMENT LESS LIKELY    Mini-CogTM Copyright [employer]. Licensed by the author for use in [facility]; reprinted with permission (zoey@.[facility]). All rights reserved.      Do you have sleep apnea, excessive snoring or daytime drowsiness?: no    Reviewed and updated as needed " this visit by clinical staff  Tobacco  Allergies  Meds  Med Hx  Surg Hx  Fam Hx  Soc Hx        Reviewed and updated as needed this visit by Provider        Social History     Tobacco Use     Smoking status: Former Smoker     Packs/day: 2.00     Years: 18.00     Pack years: 36.00     Types: Cigarettes     Start date: 1970     Last attempt to quit: 10/18/1988     Years since quittin.8     Smokeless tobacco: Never Used   Substance Use Topics     Alcohol use: Yes     Comment: occ 1x/month     If you drink alcohol do you typically have >3 drinks per day or >7 drinks per week? No    No flowsheet data found.  Current providers sharing in care for this patient include:   Patient Care Team:  Noemi Mayer MD as PCP - General (Family Practice)  Ubaldo Combs MD as MD (Neurology)  Maria Victoria Nair MD as Resident (Student in organized health care education/training program)  Noemi Mayer MD as Assigned PCP    The following health maintenance items are reviewed in Epic and correct as of today:  Health Maintenance   Topic Date Due     INFLUENZA VACCINE (1) 2020     PHQ-9  2021     MEDICARE ANNUAL WELLNESS VISIT  2021     FALL RISK ASSESSMENT  2021     PNEUMOCOCCAL IMMUNIZATION 65+ LOW/MEDIUM RISK (2 of 2 - PPSV23) 2021     DTAP/TDAP/TD IMMUNIZATION (2 - Td) 2021     MAMMO SCREENING  2022     LIPID  2025     ADVANCE CARE PLANNING  2025     COLORECTAL CANCER SCREENING  2028     DEXA  Completed     HEPATITIS C SCREENING  Completed     HIV SCREENING  Completed     ZOSTER IMMUNIZATION  Completed     IPV IMMUNIZATION  Aged Out     MENINGITIS IMMUNIZATION  Aged Out     HEPATITIS B IMMUNIZATION  Aged Out     Lab work is in process    Review of Systems  Constitutional, HEENT, cardiovascular, pulmonary, gi and gu systems are negative, except as otherwise noted.    OBJECTIVE:   /83   Pulse 88   Temp 97.1  F (36.2  C) (Tympanic)   " Resp 16   Ht 1.588 m (5' 2.5\")   Wt 105.7 kg (233 lb)   SpO2 97%   BMI 41.94 kg/m   Estimated body mass index is 41.94 kg/m  as calculated from the following:    Height as of this encounter: 1.588 m (5' 2.5\").    Weight as of this encounter: 105.7 kg (233 lb).  Physical Exam  GENERAL: healthy, alert and no distress  EYES: Eyes grossly normal to inspection, PERRL and conjunctivae and sclerae normal  HENT: ear canals and TM's normal, nose and mouth without ulcers or lesions  NECK: no adenopathy, no asymmetry, masses, or scars and thyroid normal to palpation  RESP: lungs clear to auscultation - no rales, rhonchi or wheezes  BREAST: normal without masses, tenderness or nipple discharge and no palpable axillary masses or adenopathy  CV: regular rate and rhythm, normal S1 S2, no S3 or S4, no murmur, click or rub, no peripheral edema and peripheral pulses strong  ABDOMEN: soft, nontender, no hepatosplenomegaly, no masses and bowel sounds normal  MS: no gross musculoskeletal defects noted, no edema  SKIN: no suspicious lesions or rashes  NEURO: Normal strength and tone, mentation intact and speech normal  PSYCH: mentation appears normal, affect normal/bright    Diagnostic Test Results:  Labs reviewed in Epic  Results for orders placed or performed in visit on 08/31/20   TSH with free T4 reflex     Status: None   Result Value Ref Range    TSH 0.95 0.40 - 4.00 mU/L   Hemoglobin A1c     Status: None   Result Value Ref Range    Hemoglobin A1C 5.5 0 - 5.6 %   **Comprehensive metabolic panel FUTURE anytime     Status: Abnormal   Result Value Ref Range    Sodium 137 133 - 144 mmol/L    Potassium 4.0 3.4 - 5.3 mmol/L    Chloride 105 94 - 109 mmol/L    Carbon Dioxide 28 20 - 32 mmol/L    Anion Gap 4 3 - 14 mmol/L    Glucose 108 (H) 70 - 99 mg/dL    Urea Nitrogen 16 7 - 30 mg/dL    Creatinine 0.83 0.52 - 1.04 mg/dL    GFR Estimate 74 >60 mL/min/[1.73_m2]    GFR Estimate If Black 86 >60 mL/min/[1.73_m2]    Calcium 9.4 8.5 - 10.1 " "mg/dL    Bilirubin Total 0.4 0.2 - 1.3 mg/dL    Albumin 3.4 3.4 - 5.0 g/dL    Protein Total 7.8 6.8 - 8.8 g/dL    Alkaline Phosphatase 140 40 - 150 U/L    ALT 38 0 - 50 U/L    AST 28 0 - 45 U/L   Lipid panel reflex to direct LDL Fasting     Status: Abnormal   Result Value Ref Range    Cholesterol 165 <200 mg/dL    Triglycerides 96 <150 mg/dL    HDL Cholesterol 46 (L) >49 mg/dL    LDL Cholesterol Calculated 100 (H) <100 mg/dL    Non HDL Cholesterol 119 <130 mg/dL   CBC with platelets     Status: None   Result Value Ref Range    WBC 7.5 4.0 - 11.0 10e9/L    RBC Count 4.67 3.8 - 5.2 10e12/L    Hemoglobin 13.2 11.7 - 15.7 g/dL    Hematocrit 41.2 35.0 - 47.0 %    MCV 88 78 - 100 fl    MCH 28.3 26.5 - 33.0 pg    MCHC 32.0 31.5 - 36.5 g/dL    RDW 14.0 10.0 - 15.0 %    Platelet Count 330 150 - 450 10e9/L       ASSESSMENT / PLAN:       ICD-10-CM    1. Preventative health care  Z00.00 TSH with free T4 reflex     Hemoglobin A1c     **Comprehensive metabolic panel FUTURE anytime     Lipid panel reflex to direct LDL Fasting     CBC with platelets   2. Encounter for immunization  Z23 PCV13, IM (6+ WK) - [id number]     ADMIN 1st VACCINE   3. Anxiety attack  F41.0 LORazepam (ATIVAN) 0.5 MG tablet   4. Morbid obesity (H)  E66.01        COUNSELING:  Reviewed preventive health counseling, as reflected in patient instructions       Regular exercise       Healthy diet/nutrition       Vision screening       Hearing screening       Fall risk prevention    Estimated body mass index is 41.94 kg/m  as calculated from the following:    Height as of this encounter: 1.588 m (5' 2.5\").    Weight as of this encounter: 105.7 kg (233 lb).    Weight management plan: Discussed healthy diet and exercise guidelines    She reports that she quit smoking about 31 years ago. Her smoking use included cigarettes. She started smoking about 50 years ago. She has a 36.00 pack-year smoking history. She has never used smokeless tobacco.      Appropriate preventive " services were discussed with this patient, including applicable screening as appropriate for cardiovascular disease, diabetes, osteopenia/osteoporosis, and glaucoma.  As appropriate for age/gender, discussed screening for colorectal cancer, prostate cancer, breast cancer, and cervical cancer. Checklist reviewing preventive services available has been given to the patient.    Reviewed patients plan of care and provided an AVS. The Basic Care Plan (routine screening as documented in Health Maintenance) for Nasreen meets the Care Plan requirement. This Care Plan has been established and reviewed with the Patient.    Counseling Resources:  ATP IV Guidelines  Pooled Cohorts Equation Calculator  Breast Cancer Risk Calculator  Breast Cancer: Medication to Reduce Risk  FRAX Risk Assessment  ICSI Preventive Guidelines  Dietary Guidelines for Americans, 2010  USDA's MyPlate  ASA Prophylaxis  Lung CA Screening    Noemi Mayer MD  [facility]    Identified Health Risks:

## 2020-09-01 LAB
ALBUMIN SERPL-MCNC: 3.4 G/DL (ref 3.4–5)
ALP SERPL-CCNC: 140 U/L (ref 40–150)
ALT SERPL W P-5'-P-CCNC: 38 U/L (ref 0–50)
ANION GAP SERPL CALCULATED.3IONS-SCNC: 4 MMOL/L (ref 3–14)
AST SERPL W P-5'-P-CCNC: 28 U/L (ref 0–45)
BILIRUB SERPL-MCNC: 0.4 MG/DL (ref 0.2–1.3)
BUN SERPL-MCNC: 16 MG/DL (ref 7–30)
CALCIUM SERPL-MCNC: 9.4 MG/DL (ref 8.5–10.1)
CHLORIDE SERPL-SCNC: 105 MMOL/L (ref 94–109)
CHOLEST SERPL-MCNC: 165 MG/DL
CO2 SERPL-SCNC: 28 MMOL/L (ref 20–32)
CREAT SERPL-MCNC: 0.83 MG/DL (ref 0.52–1.04)
ERYTHROCYTE [DISTWIDTH] IN BLOOD BY AUTOMATED COUNT: 14 % (ref 10–15)
GFR SERPL CREATININE-BSD FRML MDRD: 74 ML/MIN/{1.73_M2}
GLUCOSE SERPL-MCNC: 108 MG/DL (ref 70–99)
HCT VFR BLD AUTO: 41.2 % (ref 35–47)
HDLC SERPL-MCNC: 46 MG/DL
HGB BLD-MCNC: 13.2 G/DL (ref 11.7–15.7)
LDLC SERPL CALC-MCNC: 100 MG/DL
MCH RBC QN AUTO: 28.3 PG (ref 26.5–33)
MCHC RBC AUTO-ENTMCNC: 32 G/DL (ref 31.5–36.5)
MCV RBC AUTO: 88 FL (ref 78–100)
NONHDLC SERPL-MCNC: 119 MG/DL
PLATELET # BLD AUTO: 330 10E9/L (ref 150–450)
POTASSIUM SERPL-SCNC: 4 MMOL/L (ref 3.4–5.3)
PROT SERPL-MCNC: 7.8 G/DL (ref 6.8–8.8)
RBC # BLD AUTO: 4.67 10E12/L (ref 3.8–5.2)
SODIUM SERPL-SCNC: 137 MMOL/L (ref 133–144)
TRIGL SERPL-MCNC: 96 MG/DL
TSH SERPL DL<=0.005 MIU/L-ACNC: 0.95 MU/L (ref 0.4–4)
WBC # BLD AUTO: 7.5 10E9/L (ref 4–11)

## 2020-09-23 ENCOUNTER — TRANSFERRED RECORDS (OUTPATIENT)
Dept: HEALTH INFORMATION MANAGEMENT | Facility: CLINIC | Age: 65
End: 2020-09-23

## 2020-12-09 ENCOUNTER — MYC REFILL (OUTPATIENT)
Dept: FAMILY MEDICINE | Facility: CLINIC | Age: 65
End: 2020-12-09

## 2020-12-09 DIAGNOSIS — E78.5 HYPERLIPIDEMIA LDL GOAL <100: ICD-10-CM

## 2020-12-09 DIAGNOSIS — E03.9 HYPOTHYROIDISM, UNSPECIFIED TYPE: ICD-10-CM

## 2020-12-09 DIAGNOSIS — N95.1 MENOPAUSAL SYNDROME (HOT FLASHES): ICD-10-CM

## 2020-12-09 RX ORDER — ATORVASTATIN CALCIUM 80 MG/1
80 TABLET, FILM COATED ORAL DAILY
Qty: 90 TABLET | Refills: 1 | Status: CANCELLED | OUTPATIENT
Start: 2020-12-09

## 2020-12-09 RX ORDER — LEVOTHYROXINE SODIUM 150 UG/1
150 TABLET ORAL DAILY
Qty: 90 TABLET | Refills: 1 | Status: CANCELLED | OUTPATIENT
Start: 2020-12-09

## 2020-12-09 RX ORDER — PAROXETINE 10 MG/1
10 TABLET, FILM COATED ORAL AT BEDTIME
Qty: 90 TABLET | Refills: 1 | Status: CANCELLED | OUTPATIENT
Start: 2020-12-09

## 2020-12-24 DIAGNOSIS — E78.5 HYPERLIPIDEMIA LDL GOAL <100: ICD-10-CM

## 2020-12-28 RX ORDER — ATORVASTATIN CALCIUM 80 MG/1
80 TABLET, FILM COATED ORAL DAILY
Start: 2020-12-28

## 2021-01-14 ENCOUNTER — MYC MEDICAL ADVICE (OUTPATIENT)
Dept: FAMILY MEDICINE | Facility: CLINIC | Age: 66
End: 2021-01-14

## 2021-01-14 DIAGNOSIS — R39.15 URINARY URGENCY: ICD-10-CM

## 2021-01-14 DIAGNOSIS — N32.81 OVERACTIVE BLADDER: Primary | ICD-10-CM

## 2021-01-15 RX ORDER — FESOTERODINE FUMARATE 8 MG/1
8 TABLET, FILM COATED, EXTENDED RELEASE ORAL DAILY
Qty: 90 TABLET | Refills: 3 | Status: SHIPPED | OUTPATIENT
Start: 2021-01-15 | End: 2021-01-18

## 2021-01-15 RX ORDER — FESOTERODINE FUMARATE 8 MG/1
8 TABLET, FILM COATED, EXTENDED RELEASE ORAL DAILY
Qty: 90 TABLET | Refills: 3 | Status: SHIPPED | OUTPATIENT
Start: 2021-01-15 | End: 2021-01-15

## 2021-01-15 RX ORDER — FESOTERODINE FUMARATE 8 MG/1
8 TABLET, FILM COATED, EXTENDED RELEASE ORAL DAILY
Qty: 90 TABLET | Refills: 3 | Status: SHIPPED | OUTPATIENT
Start: 2021-01-15 | End: 2021-01-22

## 2021-01-15 NOTE — TELEPHONE ENCOUNTER
FS,  Please see below [employer] message and advise.  Pended Rx to print  Patient needs to come p/u Rx though   We cannot send to [facility]   Thanks,  Allie ANSARI RN

## 2021-01-18 RX ORDER — FESOTERODINE FUMARATE 8 MG/1
8 TABLET, FILM COATED, EXTENDED RELEASE ORAL DAILY
Qty: 90 TABLET | Refills: 3 | Status: SHIPPED | OUTPATIENT
Start: 2021-01-18 | End: 2021-01-22

## 2021-01-18 NOTE — TELEPHONE ENCOUNTER
Message routed to TC - they cannot print  Triage cannot print Rx's and we cannot call it in to [facility] either  Unsure what to do at this time  Allie ANSARI RN

## 2021-01-18 NOTE — TELEPHONE ENCOUNTER
I tried to re-print it today and it would not print. I contacted IT and created a ticket. They have to add the medication to epic.    Leyla

## 2021-01-22 ENCOUNTER — TELEPHONE (OUTPATIENT)
Dept: FAMILY MEDICINE | Facility: CLINIC | Age: 66
End: 2021-01-22

## 2021-01-22 DIAGNOSIS — R39.15 URINARY URGENCY: ICD-10-CM

## 2021-01-22 DIAGNOSIS — N32.81 OVERACTIVE BLADDER: ICD-10-CM

## 2021-01-22 RX ORDER — FESOTERODINE FUMARATE 8 MG/1
8 TABLET, FILM COATED, EXTENDED RELEASE ORAL DAILY
Qty: 90 TABLET | Refills: 3 | Status: SHIPPED | OUTPATIENT
Start: 2021-01-22 | End: 2021-11-03

## 2021-01-22 RX ORDER — FESOTERODINE FUMARATE 8 MG/1
8 TABLET, FILM COATED, EXTENDED RELEASE ORAL DAILY
Qty: 90 TABLET | Refills: 3 | Status: SHIPPED | OUTPATIENT
Start: 2021-01-22 | End: 2021-01-22

## 2021-01-22 RX ORDER — FESOTERODINE FUMARATE 8 MG/1
8 TABLET, FILM COATED, EXTENDED RELEASE ORAL DAILY
Qty: 90 TABLET | Refills: 3 | Status: SHIPPED | OUTPATIENT
Start: 2021-01-22 | End: 2021-10-20

## 2021-01-22 NOTE — TELEPHONE ENCOUNTER
"FYI to triage  I attempted to re-print it but it still says the same \"Printing failed\" I spoke with Garrett from IT again. He will escalate the issue and have someone look into this.     Leyla  "

## 2021-01-22 NOTE — TELEPHONE ENCOUNTER
Pt called to inquire about the script told her we apologize and will call as soon as it is ready.

## 2021-01-27 RX ORDER — FESOTERODINE FUMARATE 8 MG/1
8 TABLET, FILM COATED, EXTENDED RELEASE ORAL DAILY
Qty: 90 TABLET | Refills: 3 | Status: SHIPPED | OUTPATIENT
Start: 2021-01-27 | End: 2021-01-28

## 2021-01-27 NOTE — TELEPHONE ENCOUNTER
Patient calling checking on the Status.. she is wondering if we can Write a Rx?? She doesn't want to wait anymore please Call her at [phone]  [facility] Coordinator

## 2021-01-28 RX ORDER — FESOTERODINE FUMARATE 8 MG/1
8 TABLET, FILM COATED, EXTENDED RELEASE ORAL DAILY
Qty: 90 TABLET | Refills: 3 | Status: SHIPPED | OUTPATIENT
Start: 2021-01-28 | End: 2021-10-20

## 2021-01-28 NOTE — TELEPHONE ENCOUNTER
DE wrote out prescription    Patient informed Rx is ready and plans to  tomorrow    Placed Rx at .    Ghazala Doyle RN

## 2021-01-28 NOTE — TELEPHONE ENCOUNTER
Yes, I can write out a prescription for this.  However, I do not have a prescription pad or order forms.  If we have something like that here in the clinic let me know and I can write this for the patient.  Thank you, DE

## 2021-01-28 NOTE — TELEPHONE ENCOUNTER
DE  Please advise in FS's absence  See below message  Pt is trying to get a hard copy Rx of the Fesoterodine Fumarate (TOVIAZ) 8 MG TB24 but unable to local print for some reason.  IT is working on resolving    Pt requesting a written prescription.   Would you be able to write out an Rx for this?    Thank you,  Ghazala Doyle RN

## 2021-02-19 ENCOUNTER — MYC MEDICAL ADVICE (OUTPATIENT)
Dept: FAMILY MEDICINE | Facility: CLINIC | Age: 66
End: 2021-02-19

## 2021-02-19 DIAGNOSIS — N95.1 MENOPAUSAL SYNDROME (HOT FLASHES): ICD-10-CM

## 2021-02-19 DIAGNOSIS — E78.5 HYPERLIPIDEMIA LDL GOAL <100: ICD-10-CM

## 2021-02-19 DIAGNOSIS — E03.9 HYPOTHYROIDISM, UNSPECIFIED TYPE: ICD-10-CM

## 2021-02-19 RX ORDER — LEVOTHYROXINE SODIUM 150 UG/1
150 TABLET ORAL DAILY
Qty: 90 TABLET | Refills: 1 | Status: SHIPPED | OUTPATIENT
Start: 2021-02-19 | End: 2021-08-31

## 2021-02-19 RX ORDER — PAROXETINE 10 MG/1
10 TABLET, FILM COATED ORAL AT BEDTIME
Qty: 90 TABLET | Refills: 1 | Status: SHIPPED | OUTPATIENT
Start: 2021-02-19 | End: 2021-08-27

## 2021-02-19 RX ORDER — ATORVASTATIN CALCIUM 80 MG/1
80 TABLET, FILM COATED ORAL DAILY
Qty: 90 TABLET | Refills: 1 | Status: SHIPPED | OUTPATIENT
Start: 2021-02-19 | End: 2021-08-31

## 2021-02-19 NOTE — TELEPHONE ENCOUNTER
Paxil, Levothyroxine, Atorvastatin:    Prescription approved per [facility] Refill Protocol.  Val Arrington RN

## 2021-03-06 ENCOUNTER — IMMUNIZATION (OUTPATIENT)
Dept: NURSING | Facility: CLINIC | Age: 66
End: 2021-03-06
Payer: OTHER GOVERNMENT

## 2021-03-06 PROCEDURE — 0031A PR COVID VAC JANSSEN AD26 0.5ML: CPT

## 2021-03-06 PROCEDURE — 91303 PR COVID VAC JANSSEN AD26 0.5ML: CPT

## 2021-07-06 ENCOUNTER — HOSPITAL ENCOUNTER (OUTPATIENT)
Facility: CLINIC | Age: 66
End: 2021-07-06
Attending: INTERNAL MEDICINE | Admitting: INTERNAL MEDICINE
Payer: COMMERCIAL

## 2021-07-07 ENCOUNTER — TRANSFERRED RECORDS (OUTPATIENT)
Dept: HEALTH INFORMATION MANAGEMENT | Facility: CLINIC | Age: 66
End: 2021-07-07

## 2021-07-07 DIAGNOSIS — Z11.59 ENCOUNTER FOR SCREENING FOR OTHER VIRAL DISEASES: ICD-10-CM

## 2021-08-06 ENCOUNTER — TRANSFERRED RECORDS (OUTPATIENT)
Dept: HEALTH INFORMATION MANAGEMENT | Facility: CLINIC | Age: 66
End: 2021-08-06

## 2021-08-17 ENCOUNTER — HOSPITAL ENCOUNTER (OUTPATIENT)
Dept: MAMMOGRAPHY | Facility: CLINIC | Age: 66
Discharge: HOME | End: 2021-08-17
Attending: FAMILY MEDICINE | Admitting: FAMILY MEDICINE
Payer: COMMERCIAL

## 2021-08-17 DIAGNOSIS — Z12.31 VISIT FOR SCREENING MAMMOGRAM: ICD-10-CM

## 2021-08-17 PROCEDURE — 77063 BREAST TOMOSYNTHESIS BI: CPT

## 2021-08-23 ASSESSMENT — ANXIETY QUESTIONNAIRES
1. FEELING NERVOUS, ANXIOUS, OR ON EDGE: SEVERAL DAYS
GAD7 TOTAL SCORE: 1
6. BECOMING EASILY ANNOYED OR IRRITABLE: NOT AT ALL
3. WORRYING TOO MUCH ABOUT DIFFERENT THINGS: NOT AT ALL
GAD7 TOTAL SCORE: 1
GAD7 TOTAL SCORE: 1
5. BEING SO RESTLESS THAT IT IS HARD TO SIT STILL: NOT AT ALL
7. FEELING AFRAID AS IF SOMETHING AWFUL MIGHT HAPPEN: NOT AT ALL
8. IF YOU CHECKED OFF ANY PROBLEMS, HOW DIFFICULT HAVE THESE MADE IT FOR YOU TO DO YOUR WORK, TAKE CARE OF THINGS AT HOME, OR GET ALONG WITH OTHER PEOPLE?: NOT DIFFICULT AT ALL
4. TROUBLE RELAXING: NOT AT ALL
2. NOT BEING ABLE TO STOP OR CONTROL WORRYING: NOT AT ALL
7. FEELING AFRAID AS IF SOMETHING AWFUL MIGHT HAPPEN: NOT AT ALL

## 2021-08-23 ASSESSMENT — PATIENT HEALTH QUESTIONNAIRE - PHQ9
10. IF YOU CHECKED OFF ANY PROBLEMS, HOW DIFFICULT HAVE THESE PROBLEMS MADE IT FOR YOU TO DO YOUR WORK, TAKE CARE OF THINGS AT HOME, OR GET ALONG WITH OTHER PEOPLE: SOMEWHAT DIFFICULT
SUM OF ALL RESPONSES TO PHQ QUESTIONS 1-9: 8
SUM OF ALL RESPONSES TO PHQ QUESTIONS 1-9: 8

## 2021-08-24 ASSESSMENT — ANXIETY QUESTIONNAIRES: GAD7 TOTAL SCORE: 1

## 2021-08-25 NOTE — PROGRESS NOTES
Assessment & Plan     Hypothyroidism, unspecified type  TSH level is within normal limits.  Repeat TSH was done today.  - TSH with free T4 reflex; Future      Other fatigue  - EMOTIONAL / BEHAVIORAL ASSESSMENT- CBC with platelets; Future  - Comprehensive metabolic panel (BMP + Alb, Alk Phos, ALT, AST, Total. Bili, TP); Future  - Hemoglobin A1c; Future  - Lipid panel reflex to direct LDL Fasting; Future  -The patient is currently taking Paxil for postmenopausal hot flashes.  Does not desire any adjustment in her dose today.      Abnormal finding of blood chemistry, unspecified   - Hemoglobin A1c; Future  - Lipid panel reflex to direct LDL Fasting; Future    Return in about 2 months (around 10/26/2021) for Physical Exam.    Noemi Mayer MD  [facility]    Woodrow Downey is a 66 year old who presents for the following health issues:    HPI     The patient was started on Paxil for postmenopausal symptoms.  She desires to continue with the same dose without change.  Answers for HPI/ROS submitted by the patient on 8/23/2021  If you checked off any problems, how difficult have these problems made it for you to do your work, take care of things at home, or get along with other people?: Somewhat difficult  PHQ9 TOTAL SCORE: 8  LYNETTE 7 TOTAL SCORE: 1    Hyperlipidemia Follow-Up      Are you regularly taking any medication or supplement to lower your cholesterol?   Yes- lipitor    Are you having muscle aches or other side effects that you think could be caused by your cholesterol lowering medication?  No    Anxiety Follow-Up    How are you doing with your anxiety since your last visit? Stable     Are you having other symptoms that might be associated with anxiety? No    Have you had a significant life event? OTHER: seeing at [facility] for right knee      Are you feeling depressed? No    Do you have any concerns with your use of alcohol or other drugs? No    Also, she has been experiencing excessive  "fatigue.  She denies any recent changes in her diet or work environment.  Mental health has been stable.  Desires to get her blood work done.  Social History     Tobacco Use     Smoking status: Former Smoker     Packs/day: 2.00     Years: 18.00     Pack years: 36.00     Types: Cigarettes     Start date: 1970     Quit date: 10/18/1988     Years since quittin.8     Smokeless tobacco: Never Used   Substance Use Topics     Alcohol use: Yes     Comment: occ 1x/month     Drug use: Yes     Types: Marijuana     LYNETTE-7 SCORE 4/3/2018 2019 2021   Total Score 2 (minimal anxiety) 19 (severe anxiety) 1 (minimal anxiety)   Total Score 2 19 1     PHQ 2019   PHQ-9 Total Score 21 11 8   Q9: Thoughts of better off dead/self-harm past 2 weeks Not at all Not at all Not at all     Hypothyroidism Follow-up      Since last visit, patient describes the following symptoms: weight gain of 8 lbs      How many servings of fruits and vegetables do you eat daily?  0-1    On average, how many sweetened beverages do you drink each day (Examples: soda, juice, sweet tea, etc.  Do NOT count diet or artificially sweetened beverages)?   0    How many days per week do you exercise enough to make your heart beat faster? 7    How many minutes a day do you exercise enough to make your heart beat faster? 30 - 60    How many days per week do you miss taking your medication? 0  Review of Systems   Constitutional, HEENT, cardiovascular, pulmonary, gi and gu systems are negative, except as otherwise noted.      Objective    /74   Pulse 89   Temp 97.5  F (36.4  C) (Tympanic)   Resp 16   Ht 1.6 m (5' 3\")   Wt 106.6 kg (235 lb)   SpO2 95%   BMI 41.63 kg/m    Body mass index is 41.63 kg/m .  Physical Exam   GENERAL: healthy, alert and no distress  NECK: no adenopathy, no asymmetry, masses, or scars and thyroid normal to palpation  RESP: lungs clear to auscultation - no rales, rhonchi or wheezes  CV: regular " rate and rhythm, normal S1 S2, no S3 or S4, no murmur, click or rub, no peripheral edema and peripheral pulses strong  ABDOMEN: soft, nontender, no hepatosplenomegaly, no masses and bowel sounds normal  MS: no gross musculoskeletal defects noted, no edema    Results for orders placed or performed in visit on 08/26/21   CBC with platelets     Status: Normal   Result Value Ref Range    WBC Count 7.3 4.0 - 11.0 10e3/uL    RBC Count 4.51 3.80 - 5.20 10e6/uL    Hemoglobin 12.8 11.7 - 15.7 g/dL    Hematocrit 40.4 35.0 - 47.0 %    MCV 90 78 - 100 fL    MCH 28.4 26.5 - 33.0 pg    MCHC 31.7 31.5 - 36.5 g/dL    RDW 14.9 10.0 - 15.0 %    Platelet Count 330 150 - 450 10e3/uL   Hemoglobin A1c     Status: Normal   Result Value Ref Range    Hemoglobin A1C 5.5 0.0 - 5.6 %       ----- Service Performed and Documented by Resident or Fellow ------

## 2021-08-26 ENCOUNTER — OFFICE VISIT (OUTPATIENT)
Dept: FAMILY MEDICINE | Facility: CLINIC | Age: 66
End: 2021-08-26
Payer: COMMERCIAL

## 2021-08-26 VITALS
HEART RATE: 89 BPM | SYSTOLIC BLOOD PRESSURE: 112 MMHG | BODY MASS INDEX: 41.64 KG/M2 | WEIGHT: 235 LBS | DIASTOLIC BLOOD PRESSURE: 74 MMHG | TEMPERATURE: 97.5 F | OXYGEN SATURATION: 95 % | HEIGHT: 63 IN | RESPIRATION RATE: 16 BRPM

## 2021-08-26 DIAGNOSIS — E03.9 HYPOTHYROIDISM, UNSPECIFIED TYPE: Primary | ICD-10-CM

## 2021-08-26 DIAGNOSIS — E78.5 HYPERLIPIDEMIA LDL GOAL <100: ICD-10-CM

## 2021-08-26 DIAGNOSIS — N95.1 MENOPAUSAL SYNDROME (HOT FLASHES): ICD-10-CM

## 2021-08-26 DIAGNOSIS — R79.9 ABNORMAL FINDING OF BLOOD CHEMISTRY, UNSPECIFIED: ICD-10-CM

## 2021-08-26 DIAGNOSIS — R53.83 OTHER FATIGUE: ICD-10-CM

## 2021-08-26 LAB
ERYTHROCYTE [DISTWIDTH] IN BLOOD BY AUTOMATED COUNT: 14.9 % (ref 10–15)
HBA1C MFR BLD: 5.5 % (ref 0–5.6)
HCT VFR BLD AUTO: 40.4 % (ref 35–47)
HGB BLD-MCNC: 12.8 G/DL (ref 11.7–15.7)
MCH RBC QN AUTO: 28.4 PG (ref 26.5–33)
MCHC RBC AUTO-ENTMCNC: 31.7 G/DL (ref 31.5–36.5)
MCV RBC AUTO: 90 FL (ref 78–100)
PLATELET # BLD AUTO: 330 10E3/UL (ref 150–450)
RBC # BLD AUTO: 4.51 10E6/UL (ref 3.8–5.2)
WBC # BLD AUTO: 7.3 10E3/UL (ref 4–11)

## 2021-08-26 PROCEDURE — 83036 HEMOGLOBIN GLYCOSYLATED A1C: CPT | Performed by: FAMILY MEDICINE

## 2021-08-26 PROCEDURE — 85027 COMPLETE CBC AUTOMATED: CPT | Performed by: FAMILY MEDICINE

## 2021-08-26 PROCEDURE — 84443 ASSAY THYROID STIM HORMONE: CPT | Performed by: FAMILY MEDICINE

## 2021-08-26 PROCEDURE — 80061 LIPID PANEL: CPT | Performed by: FAMILY MEDICINE

## 2021-08-26 PROCEDURE — 99214 OFFICE O/P EST MOD 30 MIN: CPT | Performed by: FAMILY MEDICINE

## 2021-08-26 PROCEDURE — 36415 COLL VENOUS BLD VENIPUNCTURE: CPT | Performed by: FAMILY MEDICINE

## 2021-08-26 PROCEDURE — 80053 COMPREHEN METABOLIC PANEL: CPT | Performed by: FAMILY MEDICINE

## 2021-08-26 PROCEDURE — 96127 BRIEF EMOTIONAL/BEHAV ASSMT: CPT | Performed by: FAMILY MEDICINE

## 2021-08-26 ASSESSMENT — PATIENT HEALTH QUESTIONNAIRE - PHQ9: SUM OF ALL RESPONSES TO PHQ QUESTIONS 1-9: 8

## 2021-08-26 ASSESSMENT — MIFFLIN-ST. JEOR: SCORE: 1575.08

## 2021-08-27 PROBLEM — E66.09 CLASS 2 OBESITY DUE TO EXCESS CALORIES WITHOUT SERIOUS COMORBIDITY WITH BODY MASS INDEX (BMI) OF 39.0 TO 39.9 IN ADULT: Status: RESOLVED | Noted: 2018-08-24 | Resolved: 2021-08-27

## 2021-08-27 PROBLEM — E78.5 HYPERLIPIDEMIA LDL GOAL <100: Status: ACTIVE | Noted: 2021-08-27

## 2021-08-27 PROBLEM — E66.812 CLASS 2 OBESITY DUE TO EXCESS CALORIES WITHOUT SERIOUS COMORBIDITY WITH BODY MASS INDEX (BMI) OF 39.0 TO 39.9 IN ADULT: Status: RESOLVED | Noted: 2018-08-24 | Resolved: 2021-08-27

## 2021-08-27 PROBLEM — R03.0 ELEVATED BLOOD PRESSURE READING WITHOUT DIAGNOSIS OF HYPERTENSION: Status: RESOLVED | Noted: 2019-02-20 | Resolved: 2021-08-27

## 2021-08-27 LAB
ALBUMIN SERPL-MCNC: 3.2 G/DL (ref 3.4–5)
ALP SERPL-CCNC: 117 U/L (ref 40–150)
ALT SERPL W P-5'-P-CCNC: 27 U/L (ref 0–50)
ANION GAP SERPL CALCULATED.3IONS-SCNC: 7 MMOL/L (ref 3–14)
AST SERPL W P-5'-P-CCNC: 24 U/L (ref 0–45)
BILIRUB SERPL-MCNC: 0.3 MG/DL (ref 0.2–1.3)
BUN SERPL-MCNC: 16 MG/DL (ref 7–30)
CALCIUM SERPL-MCNC: 9.1 MG/DL (ref 8.5–10.1)
CHLORIDE BLD-SCNC: 107 MMOL/L (ref 94–109)
CHOLEST SERPL-MCNC: 184 MG/DL
CO2 SERPL-SCNC: 24 MMOL/L (ref 20–32)
CREAT SERPL-MCNC: 0.82 MG/DL (ref 0.52–1.04)
FASTING STATUS PATIENT QL REPORTED: YES
GFR SERPL CREATININE-BSD FRML MDRD: 75 ML/MIN/1.73M2
GLUCOSE BLD-MCNC: 95 MG/DL (ref 70–99)
HDLC SERPL-MCNC: 47 MG/DL
LDLC SERPL CALC-MCNC: 116 MG/DL
NONHDLC SERPL-MCNC: 137 MG/DL
POTASSIUM BLD-SCNC: 4.1 MMOL/L (ref 3.4–5.3)
PROT SERPL-MCNC: 7.3 G/DL (ref 6.8–8.8)
SODIUM SERPL-SCNC: 138 MMOL/L (ref 133–144)
TRIGL SERPL-MCNC: 103 MG/DL
TSH SERPL DL<=0.005 MIU/L-ACNC: 1 MU/L (ref 0.4–4)

## 2021-08-27 RX ORDER — PAROXETINE 10 MG/1
10 TABLET, FILM COATED ORAL AT BEDTIME
Qty: 90 TABLET | Refills: 1 | Status: SHIPPED | OUTPATIENT
Start: 2021-08-27 | End: 2021-10-20

## 2021-08-27 NOTE — RESULT ENCOUNTER NOTE
Dear Nasreen,   Here are your recent results. Mild increase in your LDL (Bad cholesterol). I would recommend dietary changes and weight loss.   Also, mild decline in your albumin level. Increase your protein intake.     Best Regards   Noemi Mayer MD

## 2021-10-09 ENCOUNTER — HEALTH MAINTENANCE LETTER (OUTPATIENT)
Age: 66
End: 2021-10-09

## 2021-10-17 ASSESSMENT — ENCOUNTER SYMPTOMS
FEVER: 0
PARESTHESIAS: 0
ARTHRALGIAS: 0
NERVOUS/ANXIOUS: 0
WEAKNESS: 0
MYALGIAS: 0
ABDOMINAL PAIN: 0
CHILLS: 0
FREQUENCY: 0
EYE PAIN: 0
HEARTBURN: 0
DIZZINESS: 0
DYSURIA: 0
JOINT SWELLING: 0
SORE THROAT: 0
CONSTIPATION: 0
HEMATURIA: 0
PALPITATIONS: 0
HEADACHES: 0
NAUSEA: 0
SHORTNESS OF BREATH: 0
HEMATOCHEZIA: 0
BREAST MASS: 0
DIARRHEA: 0
COUGH: 0

## 2021-10-17 ASSESSMENT — ACTIVITIES OF DAILY LIVING (ADL): CURRENT_FUNCTION: NO ASSISTANCE NEEDED

## 2021-10-17 ASSESSMENT — PATIENT HEALTH QUESTIONNAIRE - PHQ9
SUM OF ALL RESPONSES TO PHQ QUESTIONS 1-9: 12
SUM OF ALL RESPONSES TO PHQ QUESTIONS 1-9: 12
10. IF YOU CHECKED OFF ANY PROBLEMS, HOW DIFFICULT HAVE THESE PROBLEMS MADE IT FOR YOU TO DO YOUR WORK, TAKE CARE OF THINGS AT HOME, OR GET ALONG WITH OTHER PEOPLE: SOMEWHAT DIFFICULT

## 2021-10-18 ENCOUNTER — MYC MEDICAL ADVICE (OUTPATIENT)
Dept: FAMILY MEDICINE | Facility: CLINIC | Age: 66
End: 2021-10-18

## 2021-10-20 ENCOUNTER — OFFICE VISIT (OUTPATIENT)
Dept: FAMILY MEDICINE | Facility: CLINIC | Age: 66
End: 2021-10-20
Payer: COMMERCIAL

## 2021-10-20 VITALS
BODY MASS INDEX: 40.92 KG/M2 | DIASTOLIC BLOOD PRESSURE: 83 MMHG | HEART RATE: 81 BPM | SYSTOLIC BLOOD PRESSURE: 138 MMHG | WEIGHT: 239.7 LBS | HEIGHT: 64 IN | TEMPERATURE: 97.5 F | OXYGEN SATURATION: 96 %

## 2021-10-20 DIAGNOSIS — Z23 NEED FOR PROPHYLACTIC VACCINATION AND INOCULATION AGAINST INFLUENZA: ICD-10-CM

## 2021-10-20 DIAGNOSIS — Z00.00 ROUTINE GENERAL MEDICAL EXAMINATION AT A HEALTH CARE FACILITY: Primary | ICD-10-CM

## 2021-10-20 DIAGNOSIS — Z23 ENCOUNTER FOR IMMUNIZATION: ICD-10-CM

## 2021-10-20 DIAGNOSIS — N95.1 MENOPAUSAL SYNDROME (HOT FLASHES): ICD-10-CM

## 2021-10-20 PROCEDURE — G0438 PPPS, INITIAL VISIT: HCPCS | Performed by: FAMILY MEDICINE

## 2021-10-20 PROCEDURE — 90662 IIV NO PRSV INCREASED AG IM: CPT | Performed by: FAMILY MEDICINE

## 2021-10-20 PROCEDURE — G0009 ADMIN PNEUMOCOCCAL VACCINE: HCPCS | Performed by: FAMILY MEDICINE

## 2021-10-20 PROCEDURE — G0008 ADMIN INFLUENZA VIRUS VAC: HCPCS | Performed by: FAMILY MEDICINE

## 2021-10-20 PROCEDURE — 90732 PPSV23 VACC 2 YRS+ SUBQ/IM: CPT | Performed by: FAMILY MEDICINE

## 2021-10-20 RX ORDER — PAROXETINE 20 MG/1
20 TABLET, FILM COATED ORAL EVERY MORNING
Qty: 30 TABLET | Refills: 0 | Status: SHIPPED | OUTPATIENT
Start: 2021-10-20 | End: 2021-11-17

## 2021-10-20 ASSESSMENT — ENCOUNTER SYMPTOMS
JOINT SWELLING: 0
PARESTHESIAS: 0
HEARTBURN: 0
NAUSEA: 0
DIZZINESS: 0
CONSTIPATION: 0
SORE THROAT: 0
ARTHRALGIAS: 0
COUGH: 0
HEMATURIA: 0
NERVOUS/ANXIOUS: 0
BREAST MASS: 0
HEADACHES: 0
DYSURIA: 0
FREQUENCY: 0
EYE PAIN: 0
FEVER: 0
MYALGIAS: 0
DIARRHEA: 0
SHORTNESS OF BREATH: 0
PALPITATIONS: 0
WEAKNESS: 0
HEMATOCHEZIA: 0
ABDOMINAL PAIN: 0
CHILLS: 0

## 2021-10-20 ASSESSMENT — ACTIVITIES OF DAILY LIVING (ADL): CURRENT_FUNCTION: NO ASSISTANCE NEEDED

## 2021-10-20 ASSESSMENT — MIFFLIN-ST. JEOR: SCORE: 1604.33

## 2021-10-20 ASSESSMENT — PATIENT HEALTH QUESTIONNAIRE - PHQ9: SUM OF ALL RESPONSES TO PHQ QUESTIONS 1-9: 12

## 2021-10-20 NOTE — PATIENT INSTRUCTIONS
Preventive Health Recommendations    See your health care provider every year to    Review health changes.     Discuss preventive care.      Review your medicines if your doctor has prescribed any.      You no longer need a yearly Pap test unless you've had an abnormal Pap test in the past 10 years. If you have vaginal symptoms, such as bleeding or discharge, be sure to talk with your provider about a Pap test.      Every 1 to 2 years, have a mammogram.  If you are over 69, talk with your health care provider about whether or not you want to continue having screening mammograms.      Every 10 years, have a colonoscopy. Or, have a yearly FIT test (stool test). These exams will check for colon cancer.       Have a cholesterol test every 5 years, or more often if your doctor advises it.       Have a diabetes test (fasting glucose) every three years. If you are at risk for diabetes, you should have this test more often.       At age 65, have a bone density scan (DEXA) to check for osteoporosis (brittle bone disease).    Shots:    Get a flu shot each year.    Get a tetanus shot every 10 years.    Talk to your doctor about your pneumonia vaccines. There are now two you should receive - Pneumovax (PPSV 23) and Prevnar (PCV 13).    Talk to your pharmacist about the shingles vaccine.    Talk to your doctor about the hepatitis B vaccine.    Nutrition:     Eat at least 5 servings of fruits and vegetables each day.      Eat whole-grain bread, whole-wheat pasta and brown rice instead of white grains and rice.      Get adequate about Calcium and Vitamin D.     Lifestyle    Exercise at least 150 minutes a week (30 minutes a day, 5 days a week). This will help you control your weight and prevent disease.      Limit alcohol to one drink per day.      No smoking.       Wear sunscreen to prevent skin cancer.       See your dentist twice a year for an exam and cleaning.      See your eye doctor every 1 to 2 years to screen for  conditions such as glaucoma, macular degeneration, cataracts, etc.    Personalized Prevention Plan  You are due for the preventive services outlined below.  Your care team is available to assist you in scheduling these services.  If you have already completed any of these items, please share that information with your care team to update in your medical record.    Health Maintenance Due   Topic Date Due     Annual Wellness Visit  08/31/2021     FALL RISK ASSESSMENT  08/31/2021     Flu Vaccine (1) 09/01/2021     Pneumococcal Vaccine (2 of 2 - PPSV23) 08/31/2021

## 2021-10-20 NOTE — PROGRESS NOTES
"SUBJECTIVE:   Nasreen Galarza is a 66 year old female who presents for Preventive Visit.    Patient has been advised of split billing requirements and indicates understanding: Yes   Are you in the first 12 months of your Medicare coverage?  No    Healthy Habits:     In general, how would you rate your overall health?  Good    Duration of exercise:  Less than 15 minutes    Do you usually eat at least 4 servings of fruit and vegetables a day, include whole grains    & fiber and avoid regularly eating high fat or \"junk\" foods?  No    Medication side effects:  None    Ability to successfully perform activities of daily living:  No assistance needed    Home Safety:  No safety concerns identified    Hearing Impairment:  Difficulty following a conversation in a noisy restaurant or crowded room and difficulty understanding soft or whispered speech    In the past 6 months, have you been bothered by leaking of urine?  No    PHQ-2 Total Score: 4    Additional concerns today:  No    Answers for HPI/ROS submitted by the patient on 10/17/2021  If you checked off any problems, how difficult have these problems made it for you to do your work, take care of things at home, or get along with other people?: Somewhat difficult  PHQ9 TOTAL SCORE: 12      Do you feel safe in your environment? Yes    Have you ever done Advance Care Planning? (For example, a Health Directive, POLST, or a discussion with a medical provider or your loved ones about your wishes): Yes, advance care planning is on file.      Fall risk  Fallen 2 or more times in the past year?: No  Any fall with injury in the past year?: No    Cognitive Screening   1) Repeat 3 items (Leader, Season, Table)    2) Clock draw: NORMAL  3) 3 item recall: Recalls 3 objects  Results: 3 items recalled: COGNITIVE IMPAIRMENT LESS LIKELY    Mini-CogTM Copyright S [employer]. Licensed by the author for use in [employer]; reprinted with permission (zoey@.[facility]). All rights reserved.  "     Do you have sleep apnea, excessive snoring or daytime drowsiness?: yes    Reviewed and updated as needed this visit by clinical staff   Allergies               Reviewed and updated as needed this visit by Provider                Social History     Tobacco Use     Smoking status: Former Smoker     Packs/day: 2.00     Years: 18.00     Pack years: 36.00     Types: Cigarettes     Start date: 1970     Quit date: 10/18/1988     Years since quittin.0     Smokeless tobacco: Never Used   Substance Use Topics     Alcohol use: Yes     Comment: occ 1x/month     If you drink alcohol do you typically have >3 drinks per day or >7 drinks per week? No    Alcohol Use 10/17/2021   Prescreen: >3 drinks/day or >7 drinks/week? No   Prescreen: >3 drinks/day or >7 drinks/week? -   No flowsheet data found.            Current providers sharing in care for this patient include:   Patient Care Team:  Noemi Mayer MD as PCP - General (Family Practice)  Ubaldo Combs MD as MD (Neurology)  Maria Victoria Nair MD as Resident (Student in organized health care education/training program)  Noemi Mayer MD as Assigned PCP    The following health maintenance items are reviewed in Epic and correct as of today:  Health Maintenance Due   Topic Date Due     FALL RISK ASSESSMENT  2021     INFLUENZA VACCINE (1) 2021     Pneumococcal Vaccine: 65+ Years (2 of 2 - PPSV23) 2021     Lab work is in process  Pneumonia Vaccine:Adults age 65+ who received Pneumovax (PPSV23) at 65 years or older: Should be given PCV23> 1 year after their most recent PPSV23      Breast CA Risk Assessment (FHS-7) 2021   Do you have a family history of breast, colon, or ovarian cancer? No / Unknown No / Unknown       click delete button to remove this line now  Mammogram Screening: Recommended mammography every 1-2 years with patient discussion and risk factor consideration  Pertinent mammograms are reviewed under  "the imaging tab.    Review of Systems   Constitutional: Negative for chills and fever.   HENT: Negative for congestion, ear pain, hearing loss and sore throat.    Eyes: Negative for pain and visual disturbance.   Respiratory: Negative for cough and shortness of breath.    Cardiovascular: Negative for chest pain, palpitations and peripheral edema.   Gastrointestinal: Negative for abdominal pain, constipation, diarrhea, heartburn, hematochezia and nausea.   Breasts:  Negative for tenderness, breast mass and discharge.   Genitourinary: Negative for dysuria, frequency, genital sores, hematuria, pelvic pain, urgency, vaginal bleeding and vaginal discharge.   Musculoskeletal: Negative for arthralgias, joint swelling and myalgias.   Skin: Negative for rash.   Neurological: Negative for dizziness, weakness, headaches and paresthesias.   Psychiatric/Behavioral: Negative for mood changes. The patient is not nervous/anxious.      OBJECTIVE:   /83   Pulse 81   Temp 97.5  F (36.4  C)   Ht 1.613 m (5' 3.5\")   Wt 108.7 kg (239 lb 11.2 oz)   SpO2 96%   BMI 41.79 kg/m   Estimated body mass index is 41.79 kg/m  as calculated from the following:    Height as of this encounter: 1.613 m (5' 3.5\").    Weight as of this encounter: 108.7 kg (239 lb 11.2 oz).  Physical Exam  GENERAL: healthy, alert and no distress  EYES: Eyes grossly normal to inspection, PERRL and conjunctivae and sclerae normal  HENT: ear canals and TM's normal, nose and mouth without ulcers or lesions  NECK: no adenopathy, no asymmetry, masses, or scars and thyroid normal to palpation  RESP: lungs clear to auscultation - no rales, rhonchi or wheezes  BREAST: normal without masses, tenderness or nipple discharge and no palpable axillary masses or adenopathy  CV: regular rate and rhythm, normal S1 S2, no S3 or S4, no murmur, click or rub, no peripheral edema and peripheral pulses strong  ABDOMEN: soft, nontender, no hepatosplenomegaly, no masses and bowel " "sounds normal  MS: no gross musculoskeletal defects noted, no edema  SKIN: no suspicious lesions or rashes  NEURO: Normal strength and tone, mentation intact and speech normal  PSYCH: mentation appears normal, affect normal/bright    Diagnostic Test Results:  Labs reviewed in Epic  No results found for this or any previous visit (from the past 24 hour(s)).    ASSESSMENT / PLAN:       ICD-10-CM    1. Routine general medical examination at a health care facility  Z00.00    2. Menopausal syndrome (hot flashes)  N95.1 PARoxetine (PAXIL) 20 MG tablet   3. Encounter for immunization  Z23 PPSV23, IM/SUBQ (2+ YRS) - [id number]   4. Need for prophylactic vaccination and inoculation against influenza  Z23 FLU VACCINE, 3 YRS +, IM (FLUZONE)   5. BMI 40.0-44.9, adult (H)  Z68.41      Feels depressed. We increased her paxil dose from 10mg to 20 mg. Advised her to schedule a follow-up in 4 weeks.     Patient has been advised of split billing requirements and indicates understanding: Yes  COUNSELING:  Reviewed preventive health counseling, as reflected in patient instructions       Regular exercise       Healthy diet/nutrition    Estimated body mass index is 41.79 kg/m  as calculated from the following:    Height as of this encounter: 1.613 m (5' 3.5\").    Weight as of this encounter: 108.7 kg (239 lb 11.2 oz).        She reports that she quit smoking about 33 years ago. Her smoking use included cigarettes. She started smoking about 51 years ago. She has a 36.00 pack-year smoking history. She has never used smokeless tobacco.      Appropriate preventive services were discussed with this patient, including applicable screening as appropriate for cardiovascular disease, diabetes, osteopenia/osteoporosis, and glaucoma.  As appropriate for age/gender, discussed screening for colorectal cancer, prostate cancer, breast cancer, and cervical cancer. Checklist reviewing preventive services available has been given to the " patient.    Counseling Resources:  ATP IV Guidelines  Pooled Cohorts Equation Calculator  Breast Cancer Risk Calculator  Breast Cancer: Medication to Reduce Risk  FRAX Risk Assessment  ICSI Preventive Guidelines  Dietary Guidelines for Americans, 2010  USDA's MyPlate  ASA Prophylaxis  Lung CA Screening    Noemi Mayer MD  [facility]    Identified Health Risks:

## 2021-10-20 NOTE — PROGRESS NOTES
"     SUBJECTIVE:   CC: Nasreen Galarza is an 66 year old woman who presents for preventive health visit.     {Split Bill scripting  The purpose of this visit is to discuss your medical history and prevent health problems before you are sick. You may be responsible for a co-pay, coinsurance, or deductible if your visit today includes services such as checking on a sore throat, having an x-ray or lab test, or treating and evaluating a new or existing condition :564253}  Patient has been advised of split billing requirements and indicates understanding: {Yes and No:557344}  Healthy Habits:     In general, how would you rate your overall health?  Good    Duration of exercise:  Less than 15 minutes    Do you usually eat at least 4 servings of fruit and vegetables a day, include whole grains    & fiber and avoid regularly eating high fat or \"junk\" foods?  No    Medication side effects:  None    Ability to successfully perform activities of daily living:  No assistance needed    Home Safety:  No safety concerns identified    Hearing Impairment:  Difficulty following a conversation in a noisy restaurant or crowded room and difficulty understanding soft or whispered speech    In the past 6 months, have you been bothered by leaking of urine?  No    PHQ-2 Total Score: 4    Additional concerns today:  No    {Add if <65 person on Medicare  - Required Questions (Optional):347887}  {Outside tests to abstract? :966313}    {additional problems to add (Optional):253888}    Today's PHQ-2 Score:   PHQ-2 ( 1999 Pfizer) 10/17/2021   Q1: Little interest or pleasure in doing things 2   Q2: Feeling down, depressed or hopeless 2   PHQ-2 Score 4   Q1: Little interest or pleasure in doing things More than half the days   Q2: Feeling down, depressed or hopeless More than half the days   PHQ-2 Score 4       Abuse: Current or Past (Physical, Sexual or Emotional) - { :771282}  Do you feel safe in your environment? { :976273}        Social History " "    Tobacco Use     Smoking status: Former Smoker     Packs/day: 2.00     Years: 18.00     Pack years: 36.00     Types: Cigarettes     Start date: 1970     Quit date: 10/18/1988     Years since quittin.0     Smokeless tobacco: Never Used   Substance Use Topics     Alcohol use: Yes     Comment: occ 1x/month     {Rooming Staff- Complete this question if Prescreen response is not shown below for today's visit. If you drink alcohol do you typically have >3 drinks per day or >7 drinks per week? (Optional):293187}    Alcohol Use 10/17/2021   Prescreen: >3 drinks/day or >7 drinks/week? No   Prescreen: >3 drinks/day or >7 drinks/week? -   {add AUDIT responses (Optional) (A score of 7 for adult men is an indication of hazardous drinking; a score of 8 or more is an indication of an alcohol use disorder.  A score of 7 or more for adult women is an indication of hazardous drinking or an alchohol use disorder):703285}    Reviewed orders with patient.  Reviewed health maintenance and updated orders accordingly - { :712567::\"Yes\"}  {Chronicprobdata (optional):750901}    Breast Cancer Screening:    Breast CA Risk Assessment (FHS-7) 2021   Do you have a family history of breast, colon, or ovarian cancer? No / Unknown No / Unknown       {If any of the questions to the BCRA (FHS-7) are answered yes, consider ordering referral for genetic counseling (Optional) :426212::\"click delete button to remove this line now\"}  {AMB Mammogram Decision Support (Optional) :892521}  Pertinent mammograms are reviewed under the imaging tab.    History of abnormal Pap smear: { :014430}  PAP / HPV Latest Ref Rng & Units 2018   PAP (Historical) - NIL   HPV16 NEG:Negative Negative   HPV18 NEG:Negative Negative   HRHPV NEG:Negative Negative     Reviewed and updated as needed this visit by clinical staff                 Reviewed and updated as needed this visit by Provider                {HISTORY OPTIONS " "(Optional):288150}    Review of Systems   Constitutional: Negative for chills and fever.   HENT: Negative for congestion, ear pain, hearing loss and sore throat.    Eyes: Negative for pain and visual disturbance.   Respiratory: Negative for cough and shortness of breath.    Cardiovascular: Negative for chest pain, palpitations and peripheral edema.   Gastrointestinal: Negative for abdominal pain, constipation, diarrhea, heartburn, hematochezia and nausea.   Breasts:  Negative for tenderness, breast mass and discharge.   Genitourinary: Negative for dysuria, frequency, genital sores, hematuria, pelvic pain, urgency, vaginal bleeding and vaginal discharge.   Musculoskeletal: Negative for arthralgias, joint swelling and myalgias.   Skin: Negative for rash.   Neurological: Negative for dizziness, weakness, headaches and paresthesias.   Psychiatric/Behavioral: Negative for mood changes. The patient is not nervous/anxious.      {FEMALE ROS (Optional):797308}     OBJECTIVE:   There were no vitals taken for this visit.  Physical Exam  {Exam Choices (Optional):604195}    {Diagnostic Test Results (Optional):660979::\"Diagnostic Test Results:\",\"Labs reviewed in Epic\"}    ASSESSMENT/PLAN:   {Diag Picklist:884468}    Patient has been advised of split billing requirements and indicates understanding: {YES / NO:800892::\"Yes\"}  COUNSELING:  {FEMALE COUNSELING MESSAGES:558051::\"Reviewed preventive health counseling, as reflected in patient instructions\"}    Estimated body mass index is 41.63 kg/m  as calculated from the following:    Height as of 8/26/21: 1.6 m (5' 3\").    Weight as of 8/26/21: 106.6 kg (235 lb).    {Weight Management Plan (ACO) Complete if BMI is abnormal-  Ages 18-64  BMI >24.9.  Age 65+ with BMI <23 or >30 (Optional):128301}    She reports that she quit smoking about 33 years ago. Her smoking use included cigarettes. She started smoking about 51 years ago. She has a 36.00 pack-year smoking history. She has never " used smokeless tobacco.      Counseling Resources:  ATP IV Guidelines  Pooled Cohorts Equation Calculator  Breast Cancer Risk Calculator  BRCA-Related Cancer Risk Assessment: FHS-7 Tool  FRAX Risk Assessment  ICSI Preventive Guidelines  Dietary Guidelines for Americans, 2010  USDA's MyPlate  ASA Prophylaxis  Lung CA Screening    Noemi Mayer MD  [facility] [place]  Answers for HPI/ROS submitted by the patient on 10/17/2021  If you checked off any problems, how difficult have these problems made it for you to do your work, take care of things at home, or get along with other people?: Somewhat difficult  PHQ9 TOTAL SCORE: 12    Conflicting answers have been found for some questions. Please document the patient's answers manually. ***

## 2021-10-22 ENCOUNTER — TRANSFERRED RECORDS (OUTPATIENT)
Dept: HEALTH INFORMATION MANAGEMENT | Facility: CLINIC | Age: 66
End: 2021-10-22
Payer: COMMERCIAL

## 2021-11-01 ENCOUNTER — MYC MEDICAL ADVICE (OUTPATIENT)
Dept: FAMILY MEDICINE | Facility: CLINIC | Age: 66
End: 2021-11-01
Payer: COMMERCIAL

## 2021-11-01 DIAGNOSIS — R39.15 URINARY URGENCY: ICD-10-CM

## 2021-11-03 ENCOUNTER — MYC MEDICAL ADVICE (OUTPATIENT)
Dept: FAMILY MEDICINE | Facility: CLINIC | Age: 66
End: 2021-11-03

## 2021-11-03 RX ORDER — FESOTERODINE FUMARATE 8 MG/1
8 TABLET, FILM COATED, EXTENDED RELEASE ORAL DAILY
Qty: 90 TABLET | Refills: 3 | Status: SHIPPED | OUTPATIENT
Start: 2021-11-03 | End: 2022-12-14

## 2021-11-10 NOTE — TELEPHONE ENCOUNTER
Nasreen Galarza picked up with a photo ID.    Lid[facility] Patient Representative

## 2021-11-26 ENCOUNTER — E-VISIT (OUTPATIENT)
Dept: FAMILY MEDICINE | Facility: CLINIC | Age: 66
End: 2021-11-26
Payer: COMMERCIAL

## 2021-11-26 DIAGNOSIS — A08.4 VIRAL GASTROENTERITIS: Primary | ICD-10-CM

## 2021-11-26 PROCEDURE — 99207 PR NON-BILLABLE SERV PER CHARTING: CPT | Performed by: FAMILY MEDICINE

## 2021-11-28 ENCOUNTER — MYC MEDICAL ADVICE (OUTPATIENT)
Dept: FAMILY MEDICINE | Facility: CLINIC | Age: 66
End: 2021-11-28
Payer: COMMERCIAL

## 2022-01-28 DIAGNOSIS — E03.9 HYPOTHYROIDISM, UNSPECIFIED TYPE: ICD-10-CM

## 2022-01-28 DIAGNOSIS — E78.5 HYPERLIPIDEMIA LDL GOAL <100: ICD-10-CM

## 2022-01-28 RX ORDER — LEVOTHYROXINE SODIUM 150 UG/1
150 TABLET ORAL DAILY
Qty: 90 TABLET | Refills: 1 | Status: SHIPPED | OUTPATIENT
Start: 2022-01-28 | End: 2022-05-04

## 2022-01-28 RX ORDER — ATORVASTATIN CALCIUM 80 MG/1
80 TABLET, FILM COATED ORAL DAILY
Qty: 90 TABLET | Refills: 1 | Status: SHIPPED | OUTPATIENT
Start: 2022-01-28 | End: 2022-05-04

## 2022-01-28 NOTE — TELEPHONE ENCOUNTER
Prescription approved per [facility] Refill Protocol.  Allie ANSARI RN

## 2022-03-29 NOTE — PROGRESS NOTES
Assessment & Plan     Abdominal pain, generalized  Assessment: 66-year-old with a history of gastric band ligation who presented to clinic for persistent abdominal discomfort and nausea. Previous labs were reviewed and they were unremarkable. She was advised to proceed with a CT scan. CT scan ordered without contrast because the patient was not able to tolerate the last imaging study with contrast.     Plan:  - CT Abdomen w/o Contrast; Future    Morbid obesity (H)  Patient is actively losing weight. She lost 18 lbs over the past few months.   Also, patient had shortness of breath with physical exertion. Symptoms resolved already. I advised her to schedule a visit if symptoms returned.     No follow-ups on file.    Noemi Mayer MD  [facility] is a 66 year old who presents for the following health issues     History of Present Illness       Reason for visit:  TDAP - abdominal pain  Symptom onset:  3-4 weeks ago  Symptoms include:  Left upper quadrant pain  Symptom intensity:  Moderate  Symptom progression:  Staying the same  Had these symptoms before:  Yes  Has tried/received treatment for these symptoms:  No  What makes it worse:  No  What makes it better:  No    She eats 2-3 servings of fruits and vegetables daily.She consumes 2 sweetened beverage(s) daily.She exercises with enough effort to increase her heart rate 10 to 19 minutes per day.  She exercises with enough effort to increase her heart rate 4 days per week.   She is taking medications regularly.     Patient is due for another colonoscopy in June 2022.    Concern - sweating has resolved, luq abdominal pain now  Onset: 5 weeks  Description: luq  Intensity: moderate  Progression of Symptoms:  improving  Accompanying Signs & Symptoms: reflux,nausea  Previous history of similar problem: yes, concern it's her lap band  Precipitating factors:        Worsened by: none  Alleviating factors:        Improved by:  "none  Therapies tried and outcome:  none   Pain, nausea, reflux, and pain    Review of Systems   Constitutional, HEENT, cardiovascular, pulmonary, gi and gu systems are negative, except as otherwise noted.      Objective    /82   Pulse 78   Temp 97.7  F (36.5  C) (Temporal)   Resp 15   Ht 1.626 m (5' 4\")   Wt 101.6 kg (224 lb)   SpO2 97%   BMI 38.45 kg/m    Body mass index is 38.45 kg/m .  Physical Exam   GENERAL: healthy, alert and no distress  RESP: lungs clear to auscultation - no rales, rhonchi or wheezes  CV: regular rate and rhythm, normal S1 S2, no S3 or S4, no murmur, click or rub, no peripheral edema and peripheral pulses strong  ABDOMEN: soft, nontender, no hepatosplenomegaly, no masses and bowel sounds normal          "

## 2022-03-30 ENCOUNTER — OFFICE VISIT (OUTPATIENT)
Dept: FAMILY MEDICINE | Facility: CLINIC | Age: 67
End: 2022-03-30
Payer: COMMERCIAL

## 2022-03-30 VITALS
OXYGEN SATURATION: 97 % | RESPIRATION RATE: 15 BRPM | SYSTOLIC BLOOD PRESSURE: 122 MMHG | HEIGHT: 64 IN | WEIGHT: 224 LBS | DIASTOLIC BLOOD PRESSURE: 82 MMHG | BODY MASS INDEX: 38.24 KG/M2 | HEART RATE: 78 BPM | TEMPERATURE: 97.7 F

## 2022-03-30 DIAGNOSIS — E66.01 MORBID OBESITY (H): ICD-10-CM

## 2022-03-30 DIAGNOSIS — R10.84 ABDOMINAL PAIN, GENERALIZED: Primary | ICD-10-CM

## 2022-03-30 PROCEDURE — 99214 OFFICE O/P EST MOD 30 MIN: CPT | Mod: 25 | Performed by: FAMILY MEDICINE

## 2022-03-30 PROCEDURE — 90715 TDAP VACCINE 7 YRS/> IM: CPT | Performed by: FAMILY MEDICINE

## 2022-03-30 PROCEDURE — 90471 IMMUNIZATION ADMIN: CPT | Performed by: FAMILY MEDICINE

## 2022-03-30 NOTE — PATIENT INSTRUCTIONS
You can call to schedule radiology tests at the following numbers:    [facility] (including mammograms, ultrasound, CT and MRI scans and Dexa Scans)    Call   Toll Free     [facility] (including mammograms, ultrasound, CT and MRI scans and Dexa Scans)    Call   Toll Free

## 2022-04-12 ENCOUNTER — E-VISIT (OUTPATIENT)
Dept: FAMILY MEDICINE | Facility: CLINIC | Age: 67
End: 2022-04-12
Payer: COMMERCIAL

## 2022-04-12 DIAGNOSIS — Z20.822 SUSPECTED COVID-19 VIRUS INFECTION: Primary | ICD-10-CM

## 2022-04-12 PROCEDURE — 99421 OL DIG E/M SVC 5-10 MIN: CPT | Performed by: FAMILY MEDICINE

## 2022-04-13 NOTE — PATIENT INSTRUCTIONS
Dear Nasreen,      Based on your responses, you may have coronavirus (COVID-19).     Will I be tested for COVID-19?  We would like to test you for COVID. I have placed orders for this test.     To schedule: go to your [employer] home page and scroll down to the section that says  You have an appointment that needs to be scheduled  and click the large green button that says  Schedule Now  and follow the steps to find the next available openings.    If you are unable to complete these [employer] scheduling steps, please call [phone] to schedule your testing.     These guidelines are for isolating before returning to work, school or .     For employers, schools and day cares: This is an official notice for this person s medical guidelines for returning in-person.     For health care sites: The CDC gives different isolation and quarantine guidelines for healthcare sites, please check with these sites before arriving.     How do I self-isolate?  You isolate when you have symptoms of COVID or a test shows you have COVID, even if you don t have symptoms.     If you DO have symptoms:  o Stay home and away from others  - For at least 5 days after your symptoms started, AND   - You are fever free for 24 hours (with no medicine that reduces fever), AND  - Your other symptoms are better.  o Wear a mask for 10 full days any time you are around others.    If you DON T have symptoms:  o Stay at home and away from others for at least 5 days after your positive test.  o Wear a mask for 10 full days any time you are around others.    How can I take care of myself?  Over the counter medications may help with your symptoms such as runny or stuffy nose, cough, chills, or fever.  Talk to your care team about your options.     Some people are at high risk of severe illness (for example, you have a weak immune system, you re 65 years or older, or you have certain medical problems). If your risk is high and your symptoms started in  the last 5 to 7 days, we strongly recommend for you to get COVID treatment as soon as possible. Paxlovid, Molnupiravir and the monoclonal antibody treatments are proven safe and effective, make you feel better faster, and prevent hospitalization and death.       To schedule an appointment to discuss COVID treatment, request an appointment on MyChart (select  COVID-19 Treatment ) or call [phone] ([phone]), press 7.      Get lots of rest. Drink extra fluids (unless a doctor has told you not to)    Take Tylenol (acetaminophen) or ibuprofen for fever or pain. If you have liver or kidney problems, ask your family doctor if it's okay to take Tylenol or ibuprofen    Take over the counter medications for your symptoms, as directed by your doctor. You may also talk to your pharmacist.      If you have other health problems (like cancer, heart failure, an organ transplant or severe kidney disease): Call your specialty clinic if you don't feel better in the next 2 days.    Know when to call 911. Emergency warning signs include:  o Trouble breathing or shortness of breath  o Pain or pressure in the chest that doesn't go away  o Feeling confused like you haven't felt before, or not being able to wake up  o Bluish-colored lips or face    Where can I get more information?    [facility] - About COVID-19: [email or web address]     CDC - What to Do If You're Sick: https://www.cdc.gov/coronavirus/2019-ncov/if-you-are-sick/index.html     CDC - Quarantine & Isolation: https://www.cdc.gov/coronavirus/2019-ncov/your-health/quarantine-isolation.html     [facility] clinical trials (COVID-19 research studies): clinicalaffairs.[facility]/umn-clinical-trials    Below are the COVID-19 hotlines at the [facility] ([facility]). Interpreters are available.  o For health questions: Call [phone] or 1-334.214.3839 (7 a.m. to 7 p.m.)  o For questions about schools and childcare: Call  [phone] or [phone] (7 a.m. to 7 p.m.)

## 2022-04-18 ENCOUNTER — HOSPITAL ENCOUNTER (OUTPATIENT)
Dept: CT IMAGING | Facility: CLINIC | Age: 67
Discharge: HOME | End: 2022-04-18
Attending: FAMILY MEDICINE | Admitting: FAMILY MEDICINE
Payer: COMMERCIAL

## 2022-04-18 DIAGNOSIS — R10.84 ABDOMINAL PAIN, GENERALIZED: ICD-10-CM

## 2022-04-18 PROCEDURE — 74150 CT ABDOMEN W/O CONTRAST: CPT

## 2022-04-20 NOTE — RESULT ENCOUNTER NOTE
Dear Nasreen,   Here are your recent results. CT of the abdomen does not reveal any acute abnormality. The gastric band was seen. They did not documented any abnormalities with it.     Best Regards   Noemi Mayer MD

## 2022-04-26 DIAGNOSIS — N95.1 MENOPAUSAL SYNDROME (HOT FLASHES): ICD-10-CM

## 2022-04-26 RX ORDER — PAROXETINE 20 MG/1
20 TABLET, FILM COATED ORAL EVERY MORNING
Qty: 90 TABLET | Refills: 0 | OUTPATIENT
Start: 2022-04-26

## 2022-05-03 ENCOUNTER — MYC MEDICAL ADVICE (OUTPATIENT)
Dept: FAMILY MEDICINE | Facility: CLINIC | Age: 67
End: 2022-05-03
Payer: COMMERCIAL

## 2022-05-03 DIAGNOSIS — E78.5 HYPERLIPIDEMIA LDL GOAL <100: ICD-10-CM

## 2022-05-03 DIAGNOSIS — E03.9 HYPOTHYROIDISM, UNSPECIFIED TYPE: ICD-10-CM

## 2022-05-04 DIAGNOSIS — N95.1 MENOPAUSAL SYNDROME (HOT FLASHES): ICD-10-CM

## 2022-05-04 DIAGNOSIS — E03.9 HYPOTHYROIDISM, UNSPECIFIED TYPE: ICD-10-CM

## 2022-05-04 DIAGNOSIS — E78.5 HYPERLIPIDEMIA LDL GOAL <100: ICD-10-CM

## 2022-05-04 RX ORDER — LEVOTHYROXINE SODIUM 150 UG/1
150 TABLET ORAL DAILY
Qty: 90 TABLET | Refills: 0 | Status: SHIPPED | OUTPATIENT
Start: 2022-05-04 | End: 2022-08-01

## 2022-05-04 RX ORDER — ATORVASTATIN CALCIUM 80 MG/1
80 TABLET, FILM COATED ORAL DAILY
Qty: 90 TABLET | Refills: 0 | Status: CANCELLED | OUTPATIENT
Start: 2022-05-04

## 2022-05-04 RX ORDER — PAROXETINE 20 MG/1
20 TABLET, FILM COATED ORAL EVERY MORNING
Qty: 90 TABLET | Refills: 0 | Status: CANCELLED | OUTPATIENT
Start: 2022-05-04

## 2022-05-04 RX ORDER — ATORVASTATIN CALCIUM 80 MG/1
80 TABLET, FILM COATED ORAL DAILY
Qty: 90 TABLET | Refills: 0 | Status: SHIPPED | OUTPATIENT
Start: 2022-05-04 | End: 2022-08-08

## 2022-05-04 RX ORDER — LEVOTHYROXINE SODIUM 150 UG/1
150 TABLET ORAL DAILY
Qty: 90 TABLET | Refills: 0 | Status: CANCELLED | OUTPATIENT
Start: 2022-05-04

## 2022-05-11 ENCOUNTER — MYC MEDICAL ADVICE (OUTPATIENT)
Dept: FAMILY MEDICINE | Facility: CLINIC | Age: 67
End: 2022-05-11
Payer: COMMERCIAL

## 2022-05-18 DIAGNOSIS — N95.1 MENOPAUSAL SYNDROME (HOT FLASHES): ICD-10-CM

## 2022-05-18 RX ORDER — PAROXETINE 20 MG/1
20 TABLET, FILM COATED ORAL EVERY MORNING
Qty: 90 TABLET | Refills: 0 | Status: SHIPPED | OUTPATIENT
Start: 2022-05-18 | End: 2022-07-21

## 2022-05-18 NOTE — TELEPHONE ENCOUNTER
Prescription approved per [facility] Refill Protocol.  Allie ANSARI RN

## 2022-08-08 ENCOUNTER — OFFICE VISIT (OUTPATIENT)
Dept: OPHTHALMOLOGY | Facility: CLINIC | Age: 67
End: 2022-08-08
Attending: OPHTHALMOLOGY
Payer: COMMERCIAL

## 2022-08-08 DIAGNOSIS — B00.9 HSV (HERPES SIMPLEX VIRUS) INFECTION: ICD-10-CM

## 2022-08-08 DIAGNOSIS — H26.9 CATARACTS, BOTH EYES: Primary | ICD-10-CM

## 2022-08-08 DIAGNOSIS — H25.013 CORTICAL SENILE CATARACT OF BOTH EYES: ICD-10-CM

## 2022-08-08 PROCEDURE — 76519 ECHO EXAM OF EYE: CPT | Performed by: OPHTHALMOLOGY

## 2022-08-08 PROCEDURE — 92025 CPTRIZED CORNEAL TOPOGRAPHY: CPT | Performed by: OPHTHALMOLOGY

## 2022-08-08 PROCEDURE — G0463 HOSPITAL OUTPT CLINIC VISIT: HCPCS | Mod: 25

## 2022-08-08 PROCEDURE — 92015 DETERMINE REFRACTIVE STATE: CPT

## 2022-08-08 PROCEDURE — 99204 OFFICE O/P NEW MOD 45 MIN: CPT | Performed by: OPHTHALMOLOGY

## 2022-08-08 ASSESSMENT — REFRACTION_MANIFEST
OS_SPHERE: +0.75
OD_ADD: +2.50
OD_CYLINDER: +0.50
OS_CYLINDER: +0.25
OD_AXIS: 135
OD_SPHERE: PLANO
OS_ADD: +2.50
OS_AXIS: 060

## 2022-08-08 ASSESSMENT — REFRACTION_WEARINGRX
OD_AXIS: 140
OD_SPHERE: +0.50
OS_SPHERE: +1.00
OD_CYLINDER: +0.50
OS_AXIS: 060
OS_ADD: +2.50
SPECS_TYPE: PAL
OD_ADD: +2.50
OS_CYLINDER: +0.25

## 2022-08-08 ASSESSMENT — VISUAL ACUITY
OD_CC: 20/30
OS_CC+: +3
METHOD: SNELLEN - LINEAR
CORRECTION_TYPE: GLASSES
OS_CC: 20/25

## 2022-08-08 ASSESSMENT — CONF VISUAL FIELD
METHOD: COUNTING FINGERS
OS_NORMAL: 1
OD_NORMAL: 1

## 2022-08-08 ASSESSMENT — SLIT LAMP EXAM - LIDS
COMMENTS: NORMAL
COMMENTS: NORMAL

## 2022-08-08 ASSESSMENT — PACHYMETRY
OS_CT(UM): 543
OD_CT(UM): 564

## 2022-08-08 ASSESSMENT — TONOMETRY
IOP_METHOD: TONOPEN
OD_IOP_MMHG: 19
OS_IOP_MMHG: 18

## 2022-08-08 ASSESSMENT — EXTERNAL EXAM - RIGHT EYE: OD_EXAM: NORMAL

## 2022-08-08 ASSESSMENT — CUP TO DISC RATIO
OD_RATIO: 0.45
OS_RATIO: 0.4

## 2022-08-08 ASSESSMENT — EXTERNAL EXAM - LEFT EYE: OS_EXAM: NORMAL

## 2022-08-08 NOTE — PROGRESS NOTES
Chief complaint   Hypermetropia and cataract follow up. Last eye visit at [facility] about little over a year ago. Vision okay for awhile, but now getting more difficult to see clearly at reading, especially computer. Does note some glare, halos around lights after dark. Wonders if needs cataract surgery. Uses art tears a few times a week for dryness        HPI    Nasreen Galarza 67 year old female         Past ocular history   Prior eye surgery/laser/Trauma: No  CTL wearer:No  Glasses : progressive  Family Hx of eye disease: No    PMH     Past Medical History:   Diagnosis Date     Depressive disorder 1996     Hypercholesterolemia      Migraines      Thyroid disease        PSH     Past Surgical History:   Procedure Laterality Date     ABDOMEN SURGERY  2008    Lap band     ARTHROPLASTY HIP  2011    L hip replacement     BACK SURGERY  1991    L4-L5 discectomy     CHOLECYSTECTOMY  1996     COLONOSCOPY  2018     ENT SURGERY  1961    Tonsillectomy     ORTHOPEDIC SURGERY  1991    L4-L5 diskectomy '91       Meds     Current Outpatient Medications   Medication     atorvastatin (LIPITOR) 80 MG tablet     atorvastatin (LIPITOR) 80 MG tablet     butalbital-aspirin-caffeine (FIORINAL) -40 MG capsule     Fesoterodine Fumarate (TOVIAZ) 8 MG TB24     levothyroxine (SYNTHROID/LEVOTHROID) 150 MCG tablet     LORazepam (ATIVAN) 0.5 MG tablet     magnesium 500 MG TABS     Multiple Vitamins-Minerals (MULTI COMPLETE PO)     Omega-3 Fatty Acids (FISH OIL) 1200 MG capsule     PARoxetine (PAXIL) 20 MG tablet     valACYclovir (VALTREX) 500 MG tablet     No current facility-administered medications for this visit.       Labs   -     Imaging   -    Drops Currently Taking   -    Assessment/Plan   #Senile Cataracts   Patient is having difficulty with daily activities due to visual impairment. Clearly told patient that cataract surgery is NOT needed if managing with current vision. Patient wishes to proceed ahead with surgery. Explained  benefits alternatives and risks including but not limited to loss of vision, severe infection, retinal detachment, need for more surgery, visual disturbances etc...  Informed patient that reaching uncorrected vision aim (without glasses) after cataract surgery is not certain. Made patient aware they may need glasses, laser vision correction or in worst case scenario, IOL exchange.      Dilates well  no PXF  no Flomax  no guttae    -Aim: distance  -Previous refractive surgery status:-  -Corneal astigmatism:<0.1    Follow up:  Oph:after sx        Attending Physician Attestation:  Complete documentation of historical and exam elements from today's encounter can be found in the full encounter summary report (not reduplicated in this progress note).  I personally obtained the chief complaint(s) and history of present illness.  I confirmed and edited as necessary the review of systems, past medical/surgical history, family history, social history, and examination findings as documented by others; and I examined the patient myself.  I personally reviewed the relevant tests, images, and reports as documented above.  I formulated and edited as necessary the assessment and plan and discussed the findings and management plan with the patient and family. - Eliot Mcdonald MD

## 2022-08-12 ENCOUNTER — TRANSFERRED RECORDS (OUTPATIENT)
Dept: HEALTH INFORMATION MANAGEMENT | Facility: CLINIC | Age: 67
End: 2022-08-12

## 2022-08-13 ENCOUNTER — TELEPHONE (OUTPATIENT)
Dept: OPHTHALMOLOGY | Facility: CLINIC | Age: 67
End: 2022-08-13

## 2022-08-15 ENCOUNTER — TELEPHONE (OUTPATIENT)
Dept: OPHTHALMOLOGY | Facility: CLINIC | Age: 67
End: 2022-08-15

## 2022-08-15 PROBLEM — H25.013 CORTICAL SENILE CATARACT OF BOTH EYES: Status: ACTIVE | Noted: 2022-08-15

## 2022-08-15 NOTE — TELEPHONE ENCOUNTER
Called patient to schedule surgery with Dr. Mcdonald    Date of Surgery: 10/6,10/20    Location of surgery: [facility]    Pre-Op H&P: PCP    Imaging Scheduled:  No    Discussed COVID-19 Testing: Yes    Post-Op Appt Date: 10/7,10/14,10/21,10/28    Surgery Packet Mailed: yes      Additional comments: Spoke with patient to schedule. She is aware of above dates and will review packet and call with any questions        Anna C. Schoenecker on 8/15/2022 at 5:01 PM

## 2022-08-21 ENCOUNTER — E-VISIT (OUTPATIENT)
Dept: URGENT CARE | Facility: CLINIC | Age: 67
End: 2022-08-21
Payer: COMMERCIAL

## 2022-08-21 DIAGNOSIS — U07.1 INFECTION DUE TO 2019 NOVEL CORONAVIRUS: Primary | ICD-10-CM

## 2022-08-21 PROCEDURE — 99207 PR NO CHARGE LOS: CPT | Performed by: NURSE PRACTITIONER

## 2022-09-17 ENCOUNTER — HEALTH MAINTENANCE LETTER (OUTPATIENT)
Age: 67
End: 2022-09-17

## 2022-09-24 RX ORDER — PROPARACAINE HYDROCHLORIDE 5 MG/ML
1 SOLUTION/ DROPS OPHTHALMIC ONCE
Status: CANCELLED | OUTPATIENT
Start: 2022-09-24 | End: 2022-09-24

## 2022-09-24 RX ORDER — MOXIFLOXACIN 5 MG/ML
1 SOLUTION/ DROPS OPHTHALMIC
Status: CANCELLED | OUTPATIENT
Start: 2022-09-24

## 2022-09-24 RX ORDER — DICLOFENAC SODIUM 1 MG/ML
1 SOLUTION/ DROPS OPHTHALMIC
Status: CANCELLED | OUTPATIENT
Start: 2022-09-24

## 2022-09-24 RX ORDER — CYCLOPENTOLAT/TROPIC/PHENYLEPH 1%-1%-2.5%
1 DROPS (EA) OPHTHALMIC (EYE)
Status: CANCELLED | OUTPATIENT
Start: 2022-09-24

## 2022-09-27 ENCOUNTER — HOSPITAL ENCOUNTER (OUTPATIENT)
Dept: MAMMOGRAPHY | Facility: CLINIC | Age: 67
Discharge: HOME | End: 2022-09-27
Attending: FAMILY MEDICINE | Admitting: FAMILY MEDICINE
Payer: COMMERCIAL

## 2022-09-27 ENCOUNTER — TRANSFERRED RECORDS (OUTPATIENT)
Dept: HEALTH INFORMATION MANAGEMENT | Facility: CLINIC | Age: 67
End: 2022-09-27

## 2022-09-27 DIAGNOSIS — Z12.31 VISIT FOR SCREENING MAMMOGRAM: ICD-10-CM

## 2022-09-27 PROCEDURE — 77067 SCR MAMMO BI INCL CAD: CPT

## 2022-09-28 ENCOUNTER — MEDICAL CORRESPONDENCE (OUTPATIENT)
Dept: HEALTH INFORMATION MANAGEMENT | Facility: CLINIC | Age: 67
End: 2022-09-28

## 2022-10-03 ENCOUNTER — OFFICE VISIT (OUTPATIENT)
Dept: FAMILY MEDICINE | Facility: CLINIC | Age: 67
End: 2022-10-03
Payer: COMMERCIAL

## 2022-10-03 VITALS
OXYGEN SATURATION: 95 % | DIASTOLIC BLOOD PRESSURE: 67 MMHG | SYSTOLIC BLOOD PRESSURE: 125 MMHG | RESPIRATION RATE: 15 BRPM | BODY MASS INDEX: 32.06 KG/M2 | HEIGHT: 64 IN | WEIGHT: 187.8 LBS | TEMPERATURE: 97.5 F | HEART RATE: 86 BPM

## 2022-10-03 DIAGNOSIS — E78.5 HYPERLIPIDEMIA, UNSPECIFIED HYPERLIPIDEMIA TYPE: ICD-10-CM

## 2022-10-03 DIAGNOSIS — E03.9 HYPOTHYROIDISM, UNSPECIFIED TYPE: ICD-10-CM

## 2022-10-03 DIAGNOSIS — H25.013 CORTICAL SENILE CATARACT OF BOTH EYES: ICD-10-CM

## 2022-10-03 DIAGNOSIS — Z01.818 PRE-OPERATIVE GENERAL PHYSICAL EXAMINATION: Primary | ICD-10-CM

## 2022-10-03 DIAGNOSIS — E66.01 MORBID OBESITY (H): ICD-10-CM

## 2022-10-03 DIAGNOSIS — Z86.0100 HISTORY OF COLONIC POLYPS: ICD-10-CM

## 2022-10-03 PROCEDURE — 99214 OFFICE O/P EST MOD 30 MIN: CPT | Performed by: FAMILY MEDICINE

## 2022-10-03 NOTE — PATIENT INSTRUCTIONS
Labs and diagnostics not needed  Covid testing per surgeon with home test as planned  If stable will clear for surgery otherwise may need additional work up   Take all meds am of surgery except for fish oil  Hold aspirin, antiinflammatories like motrin aleve etc, fish oil and glucosamine, alcohol and any recreational drugs preferable 5 days prior to surgery  Will fax /send note to surgeon once completed    Flu and covid vaccines utd  Due for repeat colonoscopy 2022 for polyp hx  Continue care with [facility] for right hip  Continue routine care with your PCP TERI Mayer, due for a physical, has apt in nov with PCP  Preparing for Your Surgery  Getting started  A nurse will call you to review your health history and instructions. They will give you an arrival time based on your scheduled surgery time. Please be ready to share:  Your doctor's clinic name and phone number  Your medical, surgical and anesthesia history  A list of allergies and sensitivities  A list of medicines, including herbal treatments and over-the-counter drugs  Whether the patient has a legal guardian (ask how to send us the papers in advance)  Please tell us if you're pregnant--or if there's any chance you might be pregnant. Some surgeries may injure a fetus (unborn baby), so they require a pregnancy test. Surgeries that are safe for a fetus don't always need a test, and you can choose whether to have one.   If you have a child who's having surgery, please ask for a copy of Preparing for Your Child's Surgery.    Preparing for surgery  Within 10 to 30 days of surgery: Have a pre-op exam (sometimes called an H&P, or History and Physical). This can be done at a clinic or pre-operative center.  If you're having a , you may not need this exam. Talk to your care team.  At your pre-op exam, talk to your care team about all medicines you take. If you need to stop any medicines before surgery, ask when to start taking them again.  We do this for  your safety. Many medicines can make you bleed too much during surgery. Some change how well surgery (anesthesia) drugs work.  Call your insurance company to let them know you're having surgery. (If you don't have insurance, call [phone].)  Call your clinic if there's any change in your health. This includes signs of a cold or flu (sore throat, runny nose, cough, rash, fever). It also includes a scrape or scratch near the surgery site.  If you have questions on the day of surgery, call your hospital or surgery center.  COVID testing  You may need to be tested for COVID-19 before having surgery. If so, we will give you instructions (or click here).  Eating and drinking guidelines  For your safety: Unless your surgeon tells you otherwise, follow the guidelines below.  Eat and drink as usual until 8 hours before surgery. After that, no food or milk.  Drink clear liquids until 2 hours before surgery. These are liquids you can see through, like water, Gatorade and Propel Water. You may also have black coffee and tea (no cream or milk).  Nothing by mouth within 2 hours of surgery. This includes gum, candy and breath mints.  If you drink alcohol: Stop drinking it the night before surgery.  If your care team tells you to take medicine on the morning of surgery, it's okay to take it with a sip of water.  Preventing infection  Shower or bathe the night before and morning of your surgery. Follow the instructions your clinic gave you. (If no instructions, use regular soap.)  Don't shave or clip hair near your surgery site. We'll remove the hair if needed.  Don't smoke or vape the morning of surgery. You may chew nicotine gum up to 2 hours before surgery. A nicotine patch is okay.  Note: Some surgeries require you to completely quit smoking and nicotine. Check with your surgeon.  Your care team will make every effort to keep you safe from infection. We will:  Clean our hands often with soap and water (or an alcohol-based  hand rub).  Clean the skin at your surgery site with a special soap that kills germs.  Give you a special gown to keep you warm. (Cold raises the risk of infection.)  Wear special hair covers, masks, gowns and gloves during surgery.  Give antibiotic medicine, if prescribed. Not all surgeries need antibiotics.  What to bring on the day of surgery  Photo ID and insurance card  Copy of your health care directive, if you have one  Glasses and hearing aides (bring cases)  You can't wear contacts during surgery  Inhaler and eye drops, if you use them (tell us about these when you arrive)  CPAP machine or breathing device, if you use them  A few personal items, if spending the night  If you have . . .  A pacemaker, ICD (cardiac defibrillator) or other implant: Bring the ID card.  An implanted stimulator: Bring the remote control.  A legal guardian: Bring a copy of the certified (court-stamped) guardianship papers.  Please remove any jewelry, including body piercings. Leave jewelry and other valuables at home.  If you're going home the day of surgery  You must have a responsible adult drive you home. They should stay with you overnight as well.  If you don't have someone to stay with you, and you aren't safe to go home alone, we may keep you overnight. Insurance often won't pay for this.  After surgery  If it's hard to control your pain or you need more pain medicine, please call your surgeon's office.  Questions?   If you have any questions for your care team, list them here: _________________________________________________________________________________________________________________________________________________________________________ ____________________________________ ____________________________________ ____________________________________  For informational purposes only. Not to replace the advice of your health care provider. Copyright   2003, 2019 [facility]. All rights reserved. Clinically  reviewed by Arlen Gonsales MD. Department of Health and Human Services [id number] - REV 07/22.

## 2022-10-03 NOTE — PROGRESS NOTES
[facility] [address] [place] [address]  Phone: [phone]  Primary Provider: Noemi Mayer  Pre-op Performing Provider: DENISSE GARCIA    PREOPERATIVE EVALUATION:  Today's date: 10/3/2022    Nasreen Galarza is a 67 year old female who presents for a preoperative evaluation.    Surgical Information:  Surgery/Procedure: Cataract Surgery   Surgery Location: [facility]  Surgeon: Dr. Mcdonald  Surgery Date: 10/06/2022 right then left on the 20th  Time of Surgery: 1125 am  Where patient plans to recover: At home alone  Fax number for surgical facility: Note does not need to be faxed, will be available electronically in Epic.    Type of Anesthesia Anticipated: Local with MAC    Assessment & Plan     The proposed surgical procedure is considered LOW risk.    Pre-operative general physical examination  Cortical senile cataract of both eyes  Morbid obesity (H)- BMI down to 32, s/p lap band and diet  Hypothyroidism, unspecified type on levothyroxine  Hyperlipidemia, unspecified hyperlipidemia type on atorvastatin  History of colonic polyps    Labs and diagnostics not needed  Covid testing per surgeon with home test as planned  If stable will clear for surgery otherwise may need additional work up   Take all meds am of surgery except for fish oil  Hold aspirin, antiinflammatories like motrin aleve etc, fish oil and glucosamine, alcohol and any recreational drugs preferable 5 days prior to surgery  Will fax /send note to surgeon once completed    Flu and Covid vaccines utd  Due for repeat colonoscopy nov 2022 for polyp hx  Continue care with [facility] for right hip  Continue routine care with her PCP TERI Mayer, due for a physical, has apt in nov with PCP    Risks and Recommendations:  The patient has the following additional risks and recommendations for perioperative complications:   - No identified additional risk factors other than  previously addressed    RECOMMENDATION:  APPROVAL GIVEN to proceed with proposed procedure, without further diagnostic evaluation.    Review of external notes as documented above   Independent interpretation of a test performed by another physician/other qualified health care professional (not separately reported) - labs by PCP last 1 yr  Diagnosis or treatment significantly limited by social determinants of health - no family, lack of transportation post procedure if friend bails on her    30 minutes spent on the date of the encounter doing chart review, history and exam, documentation and further activities per the note    Subjective     HPI related to upcoming procedure: Here today for preop for bilateral cataract surgery.  Right one on 6 October the left 1 on 28 October.  New to this provider    Preop Questions 9/26/2022   1. Have you ever had a heart attack or stroke? No   2. Have you ever had surgery on your heart or blood vessels, such as a stent placement, a coronary artery bypass, or surgery on an artery in your head, neck, heart, or legs? No   3. Do you have chest pain with activity? No   4. Do you have a history of  heart failure? No   5. Do you currently have a cold, bronchitis or symptoms of other infection? No   6. Do you have a cough, shortness of breath, or wheezing? No   7. Do you or anyone in your family have previous history of blood clots? No   8. Do you or does anyone in your family have a serious bleeding problem such as prolonged bleeding following surgeries or cuts? No   9. Have you ever had problems with anemia or been told to take iron pills? No   10. Have you had any abnormal blood loss such as black, tarry or bloody stools, or abnormal vaginal bleeding? No   11. Have you ever had a blood transfusion? No   12. Are you willing to have a blood transfusion if it is medically needed before, during, or after your surgery? Yes   13. Have you or any of your relatives ever had problems with  anesthesia? No   14. Do you have sleep apnea, excessive snoring or daytime drowsiness? No   15. Do you have any artifical heart valves or other implanted medical devices like a pacemaker, defibrillator, or continuous glucose monitor? No   16. Do you have artificial joints? YES - left hip   17. Are you allergic to latex? No     Health Care Directive:  Patient does not have a Health Care Directive or Living Will: Patient states has Advance Directive and will bring in a copy to clinic.    Preoperative Review of :   reviewed - no record of controlled substances prescribed.  Previously given 12 of lorazepam with 1 refill in 2002 and reported on fiorinal in the past prn    Status of Chronic Conditions:  67-year-old lady with history of morbid obesity in the past, s/p lap band and currently BMI down to 32, hyperlipidemia on atorvastatin 80 mg, hypothyroidism on levothyroxine 150 mcg, migraine with aura history of magnesium and previously on fiorinal as needed, HSV history and Valtrex as needed in the past, history of depression and anxiety since 1996 on Paxil initially started for hot flashes and continued for mood.  Previously on lorazepam as needed last prescribed in 2020, history of left hip arthritis s/p replacement in 2011, L4-5 discectomy in 1991, history of tonsillectomy 1961, history of cholecystectomy, lap band procedure 2008, colonoscopy in 2018, colonic polyps, colonoscopy done [facility] 8/2022, results not known but needs repeat colonoscopy at [facility] in November, on Toviaz for overactive bladder, on multivitamin fish oil, allergic to penicillin and sulfa, moved from [place] and currently under care of PCP Dr. Hamilton [facility] last seen by them 3/30/2022 for abdominal pain and obesity CT abdomen ordered which was unremarkable.  Has appointment with PCP in November for a physical.  CBC done at [facility] in May was normal with white count of 10.7 and normal hemoglobin and BMP normal.   [place]  negative.  History of COVID in August with no residual symptoms    DEPRESSION - Patient has a long history of Depression of moderate severity requiring medication for control with recent symptoms being stable..Current symptoms of depression include none.  In remission on paxil originally started for hot flashes    HYPERLIPIDEMIA - Patient has a long history of significant Hyperlipidemia requiring medication for treatment with recent good control. Patient reports no problems or side effects with the medication. On atorvastatin 80 mg    On toviaz on overactive bladder  on magnesium  mv  Omega fish oil  Valtrex cold sores as needed  colonoscopy 8/2022, by [facility], polyp removed to get another at 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   Hx of Covid 8/19/22, sick 3 week snow resolved  On nsaids for right hip pain to get a shot at [facility] in nov    Review of Systems  CONSTITUTIONAL: NEGATIVE for fever, chills, change in weight  INTEGUMENTARY/SKIN: NEGATIVE for worrisome rashes, moles or lesions  EYES: NEGATIVE for vision changes or irritation  ENT/MOUTH: NEGATIVE for ear, mouth and throat problems  RESP: NEGATIVE for significant cough or SOB  CV: NEGATIVE for chest pain, palpitations or peripheral edema  GI: NEGATIVE for nausea, abdominal pain, heartburn, or change in bowel habits  : NEGATIVE for frequency, dysuria, or hematuria  MUSCULOSKELETAL: NEGATIVE for significant arthralgias or myalgia  NEURO: NEGATIVE for weakness, dizziness or paresthesias  ENDOCRINE: NEGATIVE for temperature intolerance, skin/hair changes  HEME: NEGATIVE for bleeding problems  PSYCHIATRIC: NEGATIVE for changes in mood or affect    Patient Active Problem List    Diagnosis Date Noted     Cortical senile cataract of both eyes 08/15/2022     Priority: Medium     Added automatically from request for surgery [id number]       Morbid obesity (H) 03/30/2022     Priority: Medium      Hyperlipidemia LDL goal <100 2021     Priority: Medium     HSV (herpes simplex virus) infection 2018     Priority: Medium     Hypothyroidism, unspecified type 2018     Priority: Medium     Menopausal syndrome (hot flashes) 2018     Priority: Medium     Migraine with aura and without status migrainosus, not intractable 12/15/2017     Priority: Medium      Past Medical History:   Diagnosis Date     Depressive disorder      Hypercholesterolemia      Migraines      Thyroid disease      Past Surgical History:   Procedure Laterality Date     ABDOMEN SURGERY      Lap band     ARTHROPLASTY HIP  2011    L hip replacement     BACK SURGERY      L4-L5 discectomy     CHOLECYSTECTOMY  1996     COLONOSCOPY       ENT SURGERY      Tonsillectomy     ORTHOPEDIC SURGERY      L4-L5 diskectomy      Current Outpatient Medications   Medication Sig Dispense Refill     atorvastatin (LIPITOR) 80 MG tablet Take 1 tablet (80 mg) by mouth daily 90 tablet 0     Fesoterodine Fumarate (TOVIAZ) 8 MG TB24 Take 1 tablet (8 mg) by mouth daily 90 tablet 3     levothyroxine (SYNTHROID/LEVOTHROID) 150 MCG tablet Take 1 tablet (150 mcg) by mouth daily 90 tablet 0     magnesium 500 MG TABS Take 500 mg by mouth daily       Multiple Vitamins-Minerals (MULTI COMPLETE PO)        Omega-3 Fatty Acids (FISH OIL) 1200 MG capsule Take 2,400 mg by mouth daily       PARoxetine (PAXIL) 20 MG tablet Take 1 tablet (20 mg) by mouth every morning 30 tablet 0     valACYclovir (VALTREX) 500 MG tablet Take 1 tablet (500 mg) by mouth 2 times daily 3 days only. 6 tablet 1       Allergies   Allergen Reactions     Penicillins Anaphylaxis     Sulfa Drugs Rash     Enola [Sulfur] Rash        Social History     Tobacco Use     Smoking status: Former Smoker     Packs/day: 2.00     Years: 18.00     Pack years: 36.00     Types: Cigarettes     Start date: 1970     Quit date: 10/18/1988     Years since quittin.9      "Smokeless tobacco: Never Used   Substance Use Topics     Alcohol use: Yes     Comment: occ 1x/month     Family History   Adopted: Yes   Problem Relation Age of Onset     Unknown/Adopted No family hx of      Glaucoma No family hx of      Macular Degeneration No family hx of      History   Drug Use     Types: Marijuana         Objective     /67 (BP Location: Right arm, Patient Position: Sitting, Cuff Size: Adult Large)   Pulse 86   Temp 97.5  F (36.4  C) (Temporal)   Resp 15   Ht 1.621 m (5' 3.8\")   Wt 85.2 kg (187 lb 12.8 oz)   SpO2 95%   BMI 32.44 kg/m      Physical Exam    GENERAL APPEARANCE: healthy, alert, active, no distress, cooperative and obese     EYES: EOMI, PERRL wearing glasses     HENT: ear canals and TM's normal and nose and mouth without ulcers or lesions     NECK: no adenopathy, no asymmetry, masses, or scars and thyroid normal to palpation     RESP: lungs clear to auscultation - no rales, rhonchi or wheezes     CV: regular rates and rhythm, normal S1 S2, no S3 or S4 and no murmur, click or rub     ABDOMEN:  soft, nontender, no HSM or masses and bowel sounds normal     MS: extremities normal- no gross deformities noted, no evidence of inflammation in joints, FROM in all extremities.     SKIN: no suspicious lesions or rashes     NEURO: Normal strength and tone, sensory exam grossly normal, mentation intact and speech normal     PSYCH: mentation appears normal. and affect normal/bright     LYMPHATICS: No cervical adenopathy    Recent Labs   Lab Test 08/26/21  0854   HGB 12.8         POTASSIUM 4.1   CR 0.82   A1C 5.5        Diagnostics:  No labs were ordered during this visit.   No EKG required for low risk surgery (cataract, skin procedure, breast biopsy, etc).    Revised Cardiac Risk Index (RCRI):  The patient has the following serious cardiovascular risks for perioperative complications:   - No serious cardiac risks = 0 points     RCRI Interpretation: 0 points: Class I (very " low risk - 0.4% complication rate)           Signed Electronically by: Purnima Pacheco MD  Copy of this evaluation report is provided to requesting physician.

## 2022-10-05 ENCOUNTER — ANESTHESIA EVENT (OUTPATIENT)
Dept: SURGERY | Facility: AMBULATORY SURGERY CENTER | Age: 67
End: 2022-10-05
Payer: COMMERCIAL

## 2022-10-06 ENCOUNTER — ANESTHESIA (OUTPATIENT)
Dept: SURGERY | Facility: AMBULATORY SURGERY CENTER | Age: 67
End: 2022-10-06
Payer: COMMERCIAL

## 2022-10-06 ENCOUNTER — HOSPITAL ENCOUNTER (OUTPATIENT)
Facility: AMBULATORY SURGERY CENTER | Age: 67
Discharge: HOME | End: 2022-10-06
Attending: OPHTHALMOLOGY
Payer: COMMERCIAL

## 2022-10-06 VITALS
SYSTOLIC BLOOD PRESSURE: 122 MMHG | WEIGHT: 187 LBS | RESPIRATION RATE: 16 BRPM | BODY MASS INDEX: 31.92 KG/M2 | HEIGHT: 64 IN | OXYGEN SATURATION: 95 % | HEART RATE: 58 BPM | DIASTOLIC BLOOD PRESSURE: 67 MMHG | TEMPERATURE: 97.2 F

## 2022-10-06 PROCEDURE — 66984 XCAPSL CTRC RMVL W/O ECP: CPT | Mod: RT

## 2022-10-06 PROCEDURE — 66984 XCAPSL CTRC RMVL W/O ECP: CPT | Mod: RT | Performed by: OPHTHALMOLOGY

## 2022-10-06 DEVICE — EYE IMP IOL ALCON ACRYSOF UV SA60WF 20.5D: Type: IMPLANTABLE DEVICE | Site: EYE | Status: FUNCTIONAL

## 2022-10-06 RX ORDER — DICLOFENAC SODIUM 1 MG/ML
1 SOLUTION/ DROPS OPHTHALMIC
Status: COMPLETED | OUTPATIENT
Start: 2022-10-06 | End: 2022-10-06

## 2022-10-06 RX ORDER — ALBUTEROL SULFATE 0.83 MG/ML
2.5 SOLUTION RESPIRATORY (INHALATION) EVERY 4 HOURS PRN
Status: DISCONTINUED | OUTPATIENT
Start: 2022-10-06 | End: 2022-10-07 | Stop reason: HOSPADM

## 2022-10-06 RX ORDER — FENTANYL CITRATE 50 UG/ML
INJECTION, SOLUTION INTRAMUSCULAR; INTRAVENOUS PRN
Status: DISCONTINUED | OUTPATIENT
Start: 2022-10-06 | End: 2022-10-06

## 2022-10-06 RX ORDER — ACETAMINOPHEN 325 MG/1
975 TABLET ORAL ONCE
Status: COMPLETED | OUTPATIENT
Start: 2022-10-06 | End: 2022-10-06

## 2022-10-06 RX ORDER — OXYCODONE HYDROCHLORIDE 5 MG/1
5 TABLET ORAL EVERY 4 HOURS PRN
Status: DISCONTINUED | OUTPATIENT
Start: 2022-10-06 | End: 2022-10-07 | Stop reason: HOSPADM

## 2022-10-06 RX ORDER — PROPARACAINE HYDROCHLORIDE 5 MG/ML
1 SOLUTION/ DROPS OPHTHALMIC ONCE
Status: COMPLETED | OUTPATIENT
Start: 2022-10-06 | End: 2022-10-06

## 2022-10-06 RX ORDER — SODIUM CHLORIDE, SODIUM LACTATE, POTASSIUM CHLORIDE, CALCIUM CHLORIDE 600; 310; 30; 20 MG/100ML; MG/100ML; MG/100ML; MG/100ML
INJECTION, SOLUTION INTRAVENOUS CONTINUOUS
Status: DISCONTINUED | OUTPATIENT
Start: 2022-10-06 | End: 2022-10-07 | Stop reason: HOSPADM

## 2022-10-06 RX ORDER — HYDROMORPHONE HYDROCHLORIDE 1 MG/ML
0.2 INJECTION, SOLUTION INTRAMUSCULAR; INTRAVENOUS; SUBCUTANEOUS EVERY 5 MIN PRN
Status: DISCONTINUED | OUTPATIENT
Start: 2022-10-06 | End: 2022-10-07 | Stop reason: HOSPADM

## 2022-10-06 RX ORDER — MEPERIDINE HYDROCHLORIDE 25 MG/ML
12.5 INJECTION INTRAMUSCULAR; INTRAVENOUS; SUBCUTANEOUS
Status: DISCONTINUED | OUTPATIENT
Start: 2022-10-06 | End: 2022-10-07 | Stop reason: HOSPADM

## 2022-10-06 RX ORDER — ONDANSETRON 2 MG/ML
4 INJECTION INTRAMUSCULAR; INTRAVENOUS EVERY 30 MIN PRN
Status: DISCONTINUED | OUTPATIENT
Start: 2022-10-06 | End: 2022-10-07 | Stop reason: HOSPADM

## 2022-10-06 RX ORDER — FENTANYL CITRATE 50 UG/ML
25 INJECTION, SOLUTION INTRAMUSCULAR; INTRAVENOUS EVERY 5 MIN PRN
Status: DISCONTINUED | OUTPATIENT
Start: 2022-10-06 | End: 2022-10-07 | Stop reason: HOSPADM

## 2022-10-06 RX ORDER — PROPARACAINE HYDROCHLORIDE 5 MG/ML
1 SOLUTION/ DROPS OPHTHALMIC ONCE
Status: DISCONTINUED | OUTPATIENT
Start: 2022-10-06 | End: 2022-10-07 | Stop reason: HOSPADM

## 2022-10-06 RX ORDER — CYCLOPENTOLAT/TROPIC/PHENYLEPH 1%-1%-2.5%
1 DROPS (EA) OPHTHALMIC (EYE)
Status: COMPLETED | OUTPATIENT
Start: 2022-10-06 | End: 2022-10-06

## 2022-10-06 RX ORDER — LIDOCAINE 40 MG/G
CREAM TOPICAL
Status: DISCONTINUED | OUTPATIENT
Start: 2022-10-06 | End: 2022-10-07 | Stop reason: HOSPADM

## 2022-10-06 RX ORDER — FENTANYL CITRATE 50 UG/ML
25 INJECTION, SOLUTION INTRAMUSCULAR; INTRAVENOUS
Status: DISCONTINUED | OUTPATIENT
Start: 2022-10-06 | End: 2022-10-07 | Stop reason: HOSPADM

## 2022-10-06 RX ORDER — DIAZEPAM 10 MG/2ML
2.5 INJECTION, SOLUTION INTRAMUSCULAR; INTRAVENOUS
Status: DISCONTINUED | OUTPATIENT
Start: 2022-10-06 | End: 2022-10-07 | Stop reason: HOSPADM

## 2022-10-06 RX ORDER — DEXAMETHASONE SODIUM PHOSPHATE 10 MG/ML
4 INJECTION, SOLUTION INTRAMUSCULAR; INTRAVENOUS EVERY 10 MIN PRN
Status: DISCONTINUED | OUTPATIENT
Start: 2022-10-06 | End: 2022-10-07 | Stop reason: HOSPADM

## 2022-10-06 RX ORDER — MOXIFLOXACIN IN NACL,ISO-OS/PF 0.3MG/0.3
SYRINGE (ML) INTRAOCULAR PRN
Status: DISCONTINUED | OUTPATIENT
Start: 2022-10-06 | End: 2022-10-06 | Stop reason: HOSPADM

## 2022-10-06 RX ORDER — BALANCED SALT SOLUTION 6.4; .75; .48; .3; 3.9; 1.7 MG/ML; MG/ML; MG/ML; MG/ML; MG/ML; MG/ML
SOLUTION OPHTHALMIC PRN
Status: DISCONTINUED | OUTPATIENT
Start: 2022-10-06 | End: 2022-10-06 | Stop reason: HOSPADM

## 2022-10-06 RX ORDER — LIDOCAINE HYDROCHLORIDE 10 MG/ML
INJECTION, SOLUTION EPIDURAL; INFILTRATION; INTRACAUDAL; PERINEURAL PRN
Status: DISCONTINUED | OUTPATIENT
Start: 2022-10-06 | End: 2022-10-06 | Stop reason: HOSPADM

## 2022-10-06 RX ORDER — ONDANSETRON 4 MG/1
4 TABLET, ORALLY DISINTEGRATING ORAL EVERY 30 MIN PRN
Status: DISCONTINUED | OUTPATIENT
Start: 2022-10-06 | End: 2022-10-07 | Stop reason: HOSPADM

## 2022-10-06 RX ORDER — MOXIFLOXACIN 5 MG/ML
1 SOLUTION/ DROPS OPHTHALMIC
Status: COMPLETED | OUTPATIENT
Start: 2022-10-06 | End: 2022-10-06

## 2022-10-06 RX ORDER — TRIAMCINOLONE ACETONIDE 40 MG/ML
INJECTION, SUSPENSION INTRA-ARTICULAR; INTRAMUSCULAR PRN
Status: DISCONTINUED | OUTPATIENT
Start: 2022-10-06 | End: 2022-10-06 | Stop reason: HOSPADM

## 2022-10-06 RX ADMIN — Medication 1 DROP: at 12:09

## 2022-10-06 RX ADMIN — DICLOFENAC SODIUM 1 DROP: 1 SOLUTION/ DROPS OPHTHALMIC at 12:04

## 2022-10-06 RX ADMIN — MOXIFLOXACIN 1 DROP: 5 SOLUTION/ DROPS OPHTHALMIC at 12:09

## 2022-10-06 RX ADMIN — DICLOFENAC SODIUM 1 DROP: 1 SOLUTION/ DROPS OPHTHALMIC at 11:55

## 2022-10-06 RX ADMIN — PROPARACAINE HYDROCHLORIDE 1 DROP: 5 SOLUTION/ DROPS OPHTHALMIC at 11:55

## 2022-10-06 RX ADMIN — Medication 1 DROP: at 12:04

## 2022-10-06 RX ADMIN — MOXIFLOXACIN 1 DROP: 5 SOLUTION/ DROPS OPHTHALMIC at 11:55

## 2022-10-06 RX ADMIN — MOXIFLOXACIN 1 DROP: 5 SOLUTION/ DROPS OPHTHALMIC at 12:04

## 2022-10-06 RX ADMIN — ACETAMINOPHEN 975 MG: 325 TABLET ORAL at 11:56

## 2022-10-06 RX ADMIN — DICLOFENAC SODIUM 1 DROP: 1 SOLUTION/ DROPS OPHTHALMIC at 12:09

## 2022-10-06 RX ADMIN — SODIUM CHLORIDE, SODIUM LACTATE, POTASSIUM CHLORIDE, CALCIUM CHLORIDE: 600; 310; 30; 20 INJECTION, SOLUTION INTRAVENOUS at 12:07

## 2022-10-06 RX ADMIN — FENTANYL CITRATE 50 MCG: 50 INJECTION, SOLUTION INTRAMUSCULAR; INTRAVENOUS at 13:19

## 2022-10-06 RX ADMIN — Medication 1 DROP: at 11:55

## 2022-10-06 NOTE — ANESTHESIA POSTPROCEDURE EVALUATION
Patient: Nasreen Galarza    Procedure: Procedure(s):  RIGHT PHACOEMULSIFICATION, CATARACT, WITH STANDARD INTRAOCULAR LENS IMPLANT INSERTION, kenalog       Anesthesia Type:  MAC    Note:  Disposition: Outpatient   Postop Pain Control: Uneventful            Sign Out: Well controlled pain   PONV: No   Neuro/Psych: Uneventful            Sign Out: Acceptable/Baseline neuro status   Airway/Respiratory: Uneventful            Sign Out: Acceptable/Baseline resp. status   CV/Hemodynamics: Uneventful            Sign Out: Acceptable CV status; No obvious hypovolemia; No obvious fluid overload   Other NRE: NONE   DID A NON-ROUTINE EVENT OCCUR? No           Last vitals:  Vitals Value Taken Time   /67 10/06/22 1407   Temp 36.2  C (97.2  F) 10/06/22 1407   Pulse 58 10/06/22 1407   Resp 16 10/06/22 1407   SpO2 95 % 10/06/22 1407       Electronically Signed By: Zack Perez MD, MD  October 6, 2022  2:20 PM

## 2022-10-06 NOTE — ANESTHESIA PREPROCEDURE EVALUATION
Anesthesia Pre-Procedure Evaluation    Patient: Nasreen Galarza   MRN: [id number] : 1955        Procedure : Procedure(s):  RIGHT PHACOEMULSIFICATION, CATARACT, WITH STANDARD INTRAOCULAR LENS IMPLANT INSERTION, kenalog          Past Medical History:   Diagnosis Date     Depressive disorder      Hypercholesterolemia      Migraines      Thyroid disease       Past Surgical History:   Procedure Laterality Date     ABDOMEN SURGERY      Lap band     ARTHROPLASTY HIP  2011    L hip replacement     CHOLECYSTECTOMY       COLONOSCOPY       COLONOSCOPY  2022     LUMBAR DISC SURGERY      L4-L5 diskectomy      TONSILLECTOMY      Tonsillectomy      Allergies   Allergen Reactions     Penicillins Anaphylaxis     Sulfa Drugs Rash     [place] [Sulfur] Rash      Social History     Tobacco Use     Smoking status: Former Smoker     Packs/day: 2.00     Years: 18.00     Pack years: 36.00     Types: Cigarettes     Start date: 1970     Quit date: 10/18/1988     Years since quittin.9     Smokeless tobacco: Never Used   Substance Use Topics     Alcohol use: Yes     Comment: occ 1x/month      Wt Readings from Last 1 Encounters:   10/06/22 84.8 kg (187 lb)        Anesthesia Evaluation            ROS/MED HX  ENT/Pulmonary:       Neurologic:       Cardiovascular:       METS/Exercise Tolerance:     Hematologic:       Musculoskeletal:       GI/Hepatic:       Renal/Genitourinary:       Endo:     (+) thyroid problem, hypothyroidism, Obesity,     Psychiatric/Substance Use:       Infectious Disease:       Malignancy:       Other:               OUTSIDE LABS:  CBC:   Lab Results   Component Value Date    WBC 7.3 2021    WBC 7.5 2020    HGB 12.8 2021    HGB 13.2 2020    HCT 40.4 2021    HCT 41.2 2020     2021     2020     BMP:   Lab Results   Component Value Date     2021     2020    POTASSIUM 4.1 2021    POTASSIUM 4.0  08/31/2020    CHLORIDE 107 08/26/2021    CHLORIDE 105 08/31/2020    CO2 24 08/26/2021    CO2 28 08/31/2020    BUN 16 08/26/2021    BUN 16 08/31/2020    CR 0.82 08/26/2021    CR 0.83 08/31/2020    GLC 95 08/26/2021     (H) 08/31/2020     COAGS: No results found for: PTT, INR, FIBR  POC: No results found for: BGM, HCG, HCGS  HEPATIC:   Lab Results   Component Value Date    ALBUMIN 3.2 (L) 08/26/2021    PROTTOTAL 7.3 08/26/2021    ALT 27 08/26/2021    AST 24 08/26/2021    ALKPHOS 117 08/26/2021    BILITOTAL 0.3 08/26/2021     OTHER:   Lab Results   Component Value Date    A1C 5.5 08/26/2021    NURA 9.1 08/26/2021    TSH 1.00 08/26/2021    T4 1.02 09/13/2019       Anesthesia Plan    ASA Status:  2      Anesthesia Type: MAC.     - Reason for MAC: immobility needed              Consents    Anesthesia Plan(s) and associated risks, benefits, and realistic alternatives discussed. Questions answered and patient/representative(s) expressed understanding.     - Discussed: Risks, Benefits and Alternatives for BOTH SEDATION and the PROCEDURE were discussed     - Discussed with:  Patient      - Extended Intubation/Ventilatory Support Discussed: No.      - Patient is DNR/DNI Status: No    Use of blood products discussed: No .     Postoperative Care            Comments:           H&P reviewed: Unable to attach H&P to encounter due to EHR limitations. H&P Update: appropriate H&P reviewed, patient examined. No interval changes since H&P (within 30 days).         Zack Perez MD, MD

## 2022-10-06 NOTE — DISCHARGE INSTRUCTIONS
Wear eye shield at all times. Avoid getting the eye wet, rubbing the eye, or hitting the eye. Avoid heavy lifting over 20 lbs, bending over with your head below your waist, and jumping up and down.    We will remove the eye shield tomorrow at your post-operative visit.    Post-Operative Instructions     FIRST 24 HOURS AFTER SURGERY:  You are allowed to Tylenol (acetaminophen) 650mg every four hours as needed for pain unless you have liver disease or are allergic to Tylenol..  Continue taking your regular medications and eye drops in the non-operative eye.  Do not remove the metal or plastic shield unless otherwise instructed by your surgeon.  Do not operate a car, motorcycle, or machinery for 24 hours after surgery.  Call ED immediately if you have SEVERE PAIN unrelieved with Tylenol. And  ask for the resident on call.     MEDICATION INSTRUCTIONS:  -You will not have treatment eye drops prescription as medications were injected into your eye.  -If you feel your eyes are dry and itchy, you can use regular lubricating drops for one month after the surgery. These eye drops can be found over the counter Brand names example: Systane, Refresh. Please choose preservative free drops. To be used four times a day and as needed for 1 month  -Instilling the eye drops directly into the eye.    -If you had previously any glaucoma eye drops, You can remove the cover and instill the drops as prescribed before and after the procedure      GENERAL INSTRUCTIONS:  Hygiene of the operated eye  Wash your hands thoroughly before caring for the eye.  If the lids are sticky or itchy in the morning, debris, or matter can be gently wiped away with a cotton ball moistened with tap water. DO NOT press on the lids or eyeball.     FIVE MINUTES APART. If ointment is prescribed, it should be applied last.  If you have been taking medications in the non-operated eye, continue as they were prescribed.  If you take medications by mouth for a medical  problem, continue as they were prescribed (unless otherwise instructed by physician)    EYE PROTECTION  From the time of surgery until the time of your post-operative day 1 appointment, wear the eye shield at all times. Then, wear it whenever sleeping, for 1 week.  Protective sunglasses may be worn as needed for your comfort, and are suggested for outdoor activities.    ACTIVITIES  Avoid vigorous exertion and heavy lifting for the first week after surgery  You may take a bath or shower, but avoid getting water directly into your eye for one week after surgery, usually by keeping your eyes closed during the bath.  You should discuss driving and traveling with your surgeon. You may ride in a car and fly in an airplane unless otherwise instructed.  Avoid swimming or using a hot tube/sauna/pool/lake for 2 weeks after the surgery.      WHAT TO EXPECT:  Mild irritation and discomfort are normal.    Call the doctor if you experience any of the following:  Severe eye pain  Nausea  Vomiting  Severe headache  [facility]  Home Care Following Anesthesia  For 24 hours after surgery:  Get plenty of rest.  A responsible adult must stay with you for at least 24 hours after you leave the surgery center.  Do not drive or use heavy equipment.  If you have weakness or tingling, don't drive or use heavy equipment until this feeling goes away.   Do not drink alcohol.   Avoid strenuous or risky activities.  Ask for help when climbing stairs.  You may feel lightheaded.  IF so, sit for a few minutes before standing.  Have someone help you get up.   If you have nausea (feel sick to your stomach): Drink only clear liquids such as apple juice, ginger ale, broth or 7-Up.  Rest may also help.  Be sure to drink enough fluids.  Move to a regular diet as you feel able.   You may have a slight fever.  Call the doctor if your fever is over 100 F (37.7 C) (taken under the tongue) or lasts longer than 24  hours.  You may have a dry mouth, a sore throat, muscle aches or trouble sleeping. These should go away after 24 hours.  Do not make important or legal decisions.   It is recommended to avoid smoking.               Tips for taking pain medications  To get the best pain relief possible, remember these points:  Take pain medications as directed, before pain becomes severe.  Pain medication can upset your stomach: taking it with food may help.  Constipation is a common side effect of pain medication. Drink plenty of  fluids.  Eat foods high in fiber. Take a stool softener if recommended by your doctor or pharmacist.  Do not drink alcohol, drive or operate machinery while taking pain medications.  Ask about other ways to control pain, such as with heat, ice or relaxation.    Tylenol/Acetaminophen Consumption  To help encourage the safe use of acetaminophen, the makers of TYLENOL  have lowered the maximum daily dose for single-ingredient Extra Strength TYLENOL  (acetaminophen) products sold in the [place] from 8 pills per day (4,000 mg) to 6 pills per day (3,000 mg). The dosing interval has also changed from 2 pills every 4-6 hours to 2 pills every 6 hours.  If you feel your pain relief is insufficient, you may take Tylenol/Acetaminophen in addition to your narcotic pain medication.   Be careful not to exceed 3,000 mg of Tylenol/Acetaminophen in a 24 hour period from all sources.  If you are taking extra strength Tylenol/acetaminophen (500 mg), the maximum dose is 6 tablets in 24 hours.  If you are taking regular strength acetaminophen (325 mg), the maximum dose is 9 tablets in 24 hours.  Tylenol 975mg given at 11:56AM.  Next dose available at 5:56PM, then follow package directions.    Call a doctor for any of the following:  Signs of infection (fever, growing tenderness at the surgery site, a large amount of drainage or bleeding, severe pain, foul-smelling drainage, redness, swelling).  It has been over 8 to 10 hours since  surgery and you are still not able to urinate (pass water).  Headache for over 24 hours.  Signs of Covid-19 infection (temperature over 100 degrees, shortness of breath, cough, loss of taste/smell, generalized body aches, persistent headache, chills, sore throat, nausea/vomiting/diarrhea)  Your doctor is:  Dr. Eliot Mcdonald, Ophthalmology: [phone]                 Or dial [phone] and ask for the resident on call for:  Ophthalmology  For emergency care, call the:  [facility]:  [phone] (TTY for hearing impaired: [phone])

## 2022-10-06 NOTE — OP NOTE
OPERATIVE REPORT    Patient Name: Nasreen Galarza  Date of Operation: October 6, 2022    Pre-operative diagnosis: Cortical senile cataract of both eyes [H25.013]  Post-operative diagnosis: Same     Procedure:  1. Cataract extraction by phacoemulsification with intraocular lens implantation, RIGHT eye    Attending: Eliot Mcdonald MD  Fellow: Ehsan Telles MD    Anesthesia: MAC/topical  Findings: None, CDE 2.64  Blood Loss: None  Complications: None   Implant: PATRICIA [id number] 20.5 D , target distance     Implant Name Type Inv. Item Serial No.  Lot No. LRB No. Used Action   EYE IMP IOL BLANCHE ACRYSOF UV SA60WF 20.5D - [id number] Lens/Eye Implant EYE IMP IOL BLANCHE ACRYSOF UV SA60WF 20.5D [id number] [employer]  Right 1 Implanted         DESCRIPTION OF PROCEDURE:   After verifying the correct surgical site, the patient was placed in supine position and taken to the operating room on an ophthalmologic gurney.  A time-out was performed verifying the patient's   identity, correct treatment site, and procedure to be performed. The operative eye was prepped and draped in the usual sterile ophthalmic fashion. A speculum was inserted to further expose the eye.   A paracentesis was made to the surgeon's left. Preservative-free lidocaine was injected into the anterior chamber using a cannula. Viscoat was injected into the anterior chamber on a cannula. A temporal wound   was made using a 2.4-mm keratome. A cystotome was used to make a rent in the anterior capsule, which was converted into a smooth curvilinear capsulorrhexis using Utrata forceps. Balance salt solution on a cannula was used to hydrodissect the lens. The lens was removed using phacoemulsification via stop & chop technique. The cortex was removed using an irrigation and aspiration handpiece. The bag was deepened using Provisc. The intraocular lens implant was loaded into a LilyMedia microinjector and injected into the bag. It was centered using an irrigation  cannula    The viscoelastic was removed using an irrigation and aspiration handpiece. The wounds were hydrated using balanced salt solution on a cannula. 0.1 cc of vigamox was injected into the anterior   chamber. The lens was found to be centered, the intraocular pressure adequate,   and the wound was watertight. Subconjunctival dexamethasone was injected inferiorly. The speculum was removed followed by the drapes. The area around the   eye was cleaned with a wet followed by dry 4 x 4's, and a strip of tobradex ointment was placed in the operative eye. The eye was shielded. The patient tolerated the procedure well.    Dr. Eliot Mcdonald was present and scrubbed for the entire procedure.    Ehsan Telles MD  Fellow, Cornea, External Disease and Refractive Surgery  Department of Ophthalmology  [facility]

## 2022-10-06 NOTE — ANESTHESIA CARE TRANSFER NOTE
Patient: Nasreen Galarza    Procedure: Procedure(s):  RIGHT PHACOEMULSIFICATION, CATARACT, WITH STANDARD INTRAOCULAR LENS IMPLANT INSERTION, kenalog       Diagnosis: Cortical senile cataract of both eyes [H25.013]  Diagnosis Additional Information: No value filed.    Anesthesia Type:   MAC     Note:    Oropharynx: oropharynx clear of all foreign objects  Level of Consciousness: awake  Oxygen Supplementation: room air    Independent Airway: airway patency satisfactory and stable  Dentition: dentition unchanged  Vital Signs Stable: post-procedure vital signs reviewed and stable  Report to RN Given: handoff report given  Patient transferred to: Phase II          Vitals:  Vitals Value Taken Time   /674    Temp 97.0    Pulse 60    Resp 16    SpO2 99        Electronically Signed By: LALITO Duvall CRNA  October 6, 2022  1:43 PM

## 2022-10-07 ENCOUNTER — OFFICE VISIT (OUTPATIENT)
Dept: OPHTHALMOLOGY | Facility: CLINIC | Age: 67
End: 2022-10-07
Attending: OPHTHALMOLOGY
Payer: COMMERCIAL

## 2022-10-07 DIAGNOSIS — H25.013 CORTICAL SENILE CATARACT OF BOTH EYES: Primary | ICD-10-CM

## 2022-10-07 DIAGNOSIS — Z96.1 PSEUDOPHAKIA, RIGHT EYE: ICD-10-CM

## 2022-10-07 PROCEDURE — 99024 POSTOP FOLLOW-UP VISIT: CPT | Performed by: STUDENT IN AN ORGANIZED HEALTH CARE EDUCATION/TRAINING PROGRAM

## 2022-10-07 ASSESSMENT — TONOMETRY
IOP_METHOD: ICARE
OD_IOP_MMHG: 18

## 2022-10-07 ASSESSMENT — VISUAL ACUITY
OD_SC+: -1
METHOD: SNELLEN - LINEAR
OD_PH_SC: 20/25
OD_PH_SC+: -1
OD_SC: 20/30

## 2022-10-07 ASSESSMENT — EXTERNAL EXAM - RIGHT EYE: OD_EXAM: NORMAL

## 2022-10-07 ASSESSMENT — SLIT LAMP EXAM - LIDS
COMMENTS: NORMAL
COMMENTS: NORMAL

## 2022-10-07 ASSESSMENT — EXTERNAL EXAM - LEFT EYE: OS_EXAM: NORMAL

## 2022-10-07 NOTE — PROGRESS NOTES
Chief complaint   POD1 s/p CE/IOL right eye 10/6/22    Initial HPI    Nasreen Galarza 67 year old female here for hypermetropia and cataract follow up. Last eye visit at [facility] about little over a year ago. Vision okay for awhile, but now getting more difficult to see clearly at reading, especially computer. Does note some glare, halos around lights after dark. Wonders if needs cataract surgery. Uses art tears a few times a week for dryness    Interval History   POD1 s/p CE/IOL right eye 10/6/22. Intracameral moxifloxacin and subconjunctival kenalog administered intraoperatively, patient not currently treated with topical gtts. Feels well without pain. No flashes, floaters, curtains. Feels vision has improved since prior to surgery. Left shield in place overnight.     Past ocular history   Prior eye surgery/laser/Trauma: CE/IOL right eye 10/6/22  CTL wearer:No  Glasses : progressive  Family Hx of eye disease: No    PMH     Past Medical History:   Diagnosis Date     Depressive disorder 1996     Hypercholesterolemia      Migraines      Thyroid disease        PSH     Past Surgical History:   Procedure Laterality Date     ABDOMEN SURGERY  2008    Lap band     ARTHROPLASTY HIP  2011    L hip replacement     CHOLECYSTECTOMY  1996     COLONOSCOPY  2018     COLONOSCOPY  08/2022     LUMBAR DISC SURGERY  1991    L4-L5 diskectomy '91     PHACOEMULSIFICATION WITH STANDARD INTRAOCULAR LENS IMPLANT Right 10/6/2022    Procedure: RIGHT PHACOEMULSIFICATION, CATARACT, WITH STANDARD INTRAOCULAR LENS IMPLANT INSERTION, kenalog;  Surgeon: Eliot Mcdonald MD;  Location: [facility] OR     TONSILLECTOMY  1961    Tonsillectomy       Meds     Current Outpatient Medications   Medication     atorvastatin (LIPITOR) 80 MG tablet     Fesoterodine Fumarate (TOVIAZ) 8 MG TB24     levothyroxine (SYNTHROID/LEVOTHROID) 150 MCG tablet     magnesium 500 MG TABS     Multiple Vitamins-Minerals (MULTI COMPLETE PO)     Omega-3 Fatty Acids (FISH OIL)  1200 MG capsule     PARoxetine (PAXIL) 20 MG tablet     valACYclovir (VALTREX) 500 MG tablet     No current facility-administered medications for this visit.     Drops Currently Taking   -    Assessment/Plan   #Senile Cataract left eye; Pseudophakia right eye  - POD1 s/p CE/IOL right eye 10/6/22  - Doing well, excellent [facility]  - Intracameral moxifloxacin and subconjunctival kenalog; no drops for now  - No heavy lifting/bending/straining  - No water in the eye  - Eye shield at night  - Discussed RD/Endophthalmitis precautions  - RTC for PoW1, sooner PRN    - Scheduled for CE/IOL left eye on 10/20/22  Dilates well  no PXF  no Flomax  no guttae  -Aim: distance  -Previous refractive surgery status:-  -Corneal astigmatism:<0.1    Follow up: POW1 VT, sooner PRN    Ehsan Telles MD  Fellow, Cornea, External Disease and Refractive Surgery  Department of Ophthalmology  [facility]    Attending Physician Attestation:  Complete documentation of historical and exam elements from today's encounter can be found in the full encounter summary report (not reduplicated in this progress note).  I personally obtained the chief complaint(s) and history of present illness.  I confirmed and edited as necessary the review of systems, past medical/surgical history, family history, social history, and examination findings as documented by others; and I examined the patient myself.  I personally reviewed the relevant tests, images, and reports as documented above.  I formulated and edited as necessary the assessment and plan and discussed the findings and management plan with the patient and family. - Ehsan Telles MD

## 2022-10-11 ENCOUNTER — TRANSFERRED RECORDS (OUTPATIENT)
Dept: HEALTH INFORMATION MANAGEMENT | Facility: CLINIC | Age: 67
End: 2022-10-11

## 2022-10-14 ENCOUNTER — OFFICE VISIT (OUTPATIENT)
Dept: OPHTHALMOLOGY | Facility: CLINIC | Age: 67
End: 2022-10-14
Attending: STUDENT IN AN ORGANIZED HEALTH CARE EDUCATION/TRAINING PROGRAM
Payer: COMMERCIAL

## 2022-10-14 DIAGNOSIS — Z96.1 PSEUDOPHAKIA, RIGHT EYE: Primary | ICD-10-CM

## 2022-10-14 PROCEDURE — G0463 HOSPITAL OUTPT CLINIC VISIT: HCPCS

## 2022-10-14 PROCEDURE — 99024 POSTOP FOLLOW-UP VISIT: CPT | Performed by: STUDENT IN AN ORGANIZED HEALTH CARE EDUCATION/TRAINING PROGRAM

## 2022-10-14 ASSESSMENT — EXTERNAL EXAM - LEFT EYE: OS_EXAM: NORMAL

## 2022-10-14 ASSESSMENT — SLIT LAMP EXAM - LIDS
COMMENTS: NORMAL
COMMENTS: NORMAL

## 2022-10-14 ASSESSMENT — EXTERNAL EXAM - RIGHT EYE: OD_EXAM: NORMAL

## 2022-10-14 ASSESSMENT — REFRACTION_WEARINGRX
OD_AXIS: 140
OS_AXIS: 060
SPECS_TYPE: PAL
OD_SPHERE: +0.50
OS_ADD: +2.50
OD_ADD: +2.50
OD_CYLINDER: +0.50
OS_SPHERE: +1.00
OS_CYLINDER: +0.25

## 2022-10-14 ASSESSMENT — CONF VISUAL FIELD
OD_NORMAL: 1
OD_INFERIOR_TEMPORAL_RESTRICTION: 0
OS_INFERIOR_TEMPORAL_RESTRICTION: 0
OS_SUPERIOR_NASAL_RESTRICTION: 0
METHOD: COUNTING FINGERS
OD_SUPERIOR_TEMPORAL_RESTRICTION: 0
OS_INFERIOR_NASAL_RESTRICTION: 0
OS_NORMAL: 1
OS_SUPERIOR_TEMPORAL_RESTRICTION: 0
OD_INFERIOR_NASAL_RESTRICTION: 0
OD_SUPERIOR_NASAL_RESTRICTION: 0

## 2022-10-14 ASSESSMENT — VISUAL ACUITY
CORRECTION_TYPE: GLASSES
OD_SC: 20/25
METHOD: SNELLEN - LINEAR
OS_CC+: -1
OS_CC: 20/25

## 2022-10-14 ASSESSMENT — TONOMETRY
OD_IOP_MMHG: 10
IOP_METHOD: ICARE
OS_IOP_MMHG: 13

## 2022-10-14 NOTE — NURSING NOTE
Chief Complaints and History of Present Illnesses   Patient presents with     Post Op (Ophthalmology) Right Eye     POW#1 s/p CE/IOL right eye 10/6/22.      Chief Complaint(s) and History of Present Illness(es)     Post Op (Ophthalmology) Right Eye            Comments: POW#1 s/p CE/IOL right eye 10/6/22.           Comments    Pt states vision has been good since surgery. No eye pain today, just some aching. No new flashes or floaters.  No new redness or dryness.    JERED Wilburn October 14, 2022 8:37 AM

## 2022-10-14 NOTE — PROGRESS NOTES
Chief complaint   POW1 s/p CE/IOL right eye 10/6/22    Initial HPI    Nasreen Galarza 67 year old female here for hypermetropia and cataract follow up. Last eye visit at [facility] about little over a year ago. Vision okay for awhile, but now getting more difficult to see clearly at reading, especially computer. Does note some glare, halos around lights after dark. Wonders if needs cataract surgery. Uses art tears a few times a week for dryness    Interval History   POW1 s/p CE/IOL right eye 10/6/22. Intracameral moxifloxacin and subconjunctival kenalog administered intraoperatively, patient not currently treated with topical gtts. No pain. Feels that her vision is improving every day. No flashes, floaters, curtains.     Past ocular history   Prior eye surgery/laser/Trauma: CE/IOL right eye 10/6/22  CTL wearer:No  Glasses : progressive  Family Hx of eye disease: No    PMH     Past Medical History:   Diagnosis Date     Depressive disorder 1996     Hypercholesterolemia      Migraines      Thyroid disease        PSH     Past Surgical History:   Procedure Laterality Date     ABDOMEN SURGERY  2008    Lap band     ARTHROPLASTY HIP  2011    L hip replacement     CHOLECYSTECTOMY  1996     COLONOSCOPY  2018     COLONOSCOPY  08/2022     LUMBAR DISC SURGERY  1991    L4-L5 diskectomy '91     PHACOEMULSIFICATION WITH STANDARD INTRAOCULAR LENS IMPLANT Right 10/6/2022    Procedure: RIGHT PHACOEMULSIFICATION, CATARACT, WITH STANDARD INTRAOCULAR LENS IMPLANT INSERTION, kenalog;  Surgeon: Eliot Mcdonald MD;  Location: [facility] OR     TONSILLECTOMY  1961    Tonsillectomy       Meds     Current Outpatient Medications   Medication     atorvastatin (LIPITOR) 80 MG tablet     Fesoterodine Fumarate (TOVIAZ) 8 MG TB24     levothyroxine (SYNTHROID/LEVOTHROID) 150 MCG tablet     magnesium 500 MG TABS     Multiple Vitamins-Minerals (MULTI COMPLETE PO)     Omega-3 Fatty Acids (FISH OIL) 1200 MG capsule     PARoxetine (PAXIL) 20 MG tablet      valACYclovir (VALTREX) 500 MG tablet     No current facility-administered medications for this visit.     Drops Currently Taking   N/A    Assessment/Plan   #Senile Cataract left eye; Pseudophakia right eye  - POW1 s/p CE/IOL right eye 10/6/22  - [facility] well[facility]  - Intracameral moxifloxacin and subconjunctival kenalog; no drops for now  - No water in the eye  - Can discontinue eye shield at night  - Discussed RD/Endophthalmitis precautions  - Scheduled for CE/IOL left eye on 10/20/22 w/ POD1 visit scheduled for 10/21/22    Dilates well  no PXF  no Flomax  no guttae  -Aim: distance  -Previous refractive surgery status:-  -Corneal astigmatism:<0.1    Follow up: POD1 s/p CE/IOL OS VT, sooner PRN    Ehsan Telles MD  Fellow, Cornea, External Disease and Refractive Surgery  Department of Ophthalmology  [facility]    Attending Physician Attestation:  Complete documentation of historical and exam elements from today's encounter can be found in the full encounter summary report (not reduplicated in this progress note).  I personally obtained the chief complaint(s) and history of present illness.  I confirmed and edited as necessary the review of systems, past medical/surgical history, family history, social history, and examination findings as documented by others; and I examined the patient myself.  I personally reviewed the relevant tests, images, and reports as documented above.  I formulated and edited as necessary the assessment and plan and discussed the findings and management plan with the patient and family. - Ehsan Telles MD

## 2022-10-14 NOTE — H&P (VIEW-ONLY)
Chief complaint   POW1 s/p CE/IOL right eye 10/6/22    Initial HPI    Nasreen Galarza 67 year old female here for hypermetropia and cataract follow up. Last eye visit at [facility] about little over a year ago. Vision okay for awhile, but now getting more difficult to see clearly at reading, especially computer. Does note some glare, halos around lights after dark. Wonders if needs cataract surgery. Uses art tears a few times a week for dryness    Interval History   POW1 s/p CE/IOL right eye 10/6/22. Intracameral moxifloxacin and subconjunctival kenalog administered intraoperatively, patient not currently treated with topical gtts. No pain. Feels that her vision is improving every day. No flashes, floaters, curtains.     Past ocular history   Prior eye surgery/laser/Trauma: CE/IOL right eye 10/6/22  CTL wearer:No  Glasses : progressive  Family Hx of eye disease: No    PMH     Past Medical History:   Diagnosis Date     Depressive disorder 1996     Hypercholesterolemia      Migraines      Thyroid disease        PSH     Past Surgical History:   Procedure Laterality Date     ABDOMEN SURGERY  2008    Lap band     ARTHROPLASTY HIP  2011    L hip replacement     CHOLECYSTECTOMY  1996     COLONOSCOPY  2018     COLONOSCOPY  08/2022     LUMBAR DISC SURGERY  1991    L4-L5 diskectomy '91     PHACOEMULSIFICATION WITH STANDARD INTRAOCULAR LENS IMPLANT Right 10/6/2022    Procedure: RIGHT PHACOEMULSIFICATION, CATARACT, WITH STANDARD INTRAOCULAR LENS IMPLANT INSERTION, kenalog;  Surgeon: Eliot Mcdonald MD;  Location: [facility] OR     TONSILLECTOMY  1961    Tonsillectomy       Meds     Current Outpatient Medications   Medication     atorvastatin (LIPITOR) 80 MG tablet     Fesoterodine Fumarate (TOVIAZ) 8 MG TB24     levothyroxine (SYNTHROID/LEVOTHROID) 150 MCG tablet     magnesium 500 MG TABS     Multiple Vitamins-Minerals (MULTI COMPLETE PO)     Omega-3 Fatty Acids (FISH OIL) 1200 MG capsule     PARoxetine (PAXIL) 20 MG tablet      valACYclovir (VALTREX) 500 MG tablet     No current facility-administered medications for this visit.     Drops Currently Taking   N/A    Assessment/Plan   #Senile Cataract left eye; Pseudophakia right eye  - POW1 s/p CE/IOL right eye 10/6/22  - Doing well, excellent [facility]  - Intracameral moxifloxacin and subconjunctival kenalog; no drops for now  - No water in the eye  - Can discontinue eye shield at night  - Discussed RD/Endophthalmitis precautions  - Scheduled for CE/IOL left eye on 10/20/22 w/ POD1 visit scheduled for 10/21/22    Dilates well  no PXF  no Flomax  no guttae  -Aim: distance  -Previous refractive surgery status:-  -Corneal astigmatism:<0.1    Follow up: POD1 s/p CE/IOL OS VT, sooner PRN    Ehsan Telles MD  Fellow, Cornea, External Disease and Refractive Surgery  Department of Ophthalmology  [facility]    Attending Physician Attestation:  Complete documentation of historical and exam elements from today's encounter can be found in the full encounter summary report (not reduplicated in this progress note).  I personally obtained the chief complaint(s) and history of present illness.  I confirmed and edited as necessary the review of systems, past medical/surgical history, family history, social history, and examination findings as documented by others; and I examined the patient myself.  I personally reviewed the relevant tests, images, and reports as documented above.  I formulated and edited as necessary the assessment and plan and discussed the findings and management plan with the patient and family. - Ehsan Telles MD

## 2022-10-19 ENCOUNTER — ANESTHESIA EVENT (OUTPATIENT)
Dept: SURGERY | Facility: AMBULATORY SURGERY CENTER | Age: 67
End: 2022-10-19
Payer: COMMERCIAL

## 2022-10-19 ENCOUNTER — MYC MEDICAL ADVICE (OUTPATIENT)
Dept: FAMILY MEDICINE | Facility: CLINIC | Age: 67
End: 2022-10-19

## 2022-10-19 DIAGNOSIS — R93.3 ABNORMAL COLONOSCOPY: Primary | ICD-10-CM

## 2022-10-19 DIAGNOSIS — B00.9 HSV (HERPES SIMPLEX VIRUS) INFECTION: ICD-10-CM

## 2022-10-20 ENCOUNTER — HOSPITAL ENCOUNTER (OUTPATIENT)
Facility: AMBULATORY SURGERY CENTER | Age: 67
Discharge: HOME | End: 2022-10-20
Attending: OPHTHALMOLOGY
Payer: COMMERCIAL

## 2022-10-20 ENCOUNTER — ANESTHESIA (OUTPATIENT)
Dept: SURGERY | Facility: AMBULATORY SURGERY CENTER | Age: 67
End: 2022-10-20
Payer: COMMERCIAL

## 2022-10-20 VITALS
TEMPERATURE: 97.1 F | HEART RATE: 55 BPM | BODY MASS INDEX: 31.24 KG/M2 | SYSTOLIC BLOOD PRESSURE: 143 MMHG | OXYGEN SATURATION: 96 % | RESPIRATION RATE: 18 BRPM | HEIGHT: 64 IN | DIASTOLIC BLOOD PRESSURE: 78 MMHG | WEIGHT: 183 LBS

## 2022-10-20 DIAGNOSIS — H25.013 CORTICAL SENILE CATARACT OF BOTH EYES: ICD-10-CM

## 2022-10-20 PROCEDURE — 66984 XCAPSL CTRC RMVL W/O ECP: CPT | Mod: 79 | Performed by: OPHTHALMOLOGY

## 2022-10-20 PROCEDURE — 66984 XCAPSL CTRC RMVL W/O ECP: CPT | Mod: LT

## 2022-10-20 DEVICE — EYE IMP IOL ALCON ACRYSOF UV SA60WF +20.0D: Type: IMPLANTABLE DEVICE | Site: EYE | Status: FUNCTIONAL

## 2022-10-20 RX ORDER — LIDOCAINE 40 MG/G
CREAM TOPICAL
Status: DISCONTINUED | OUTPATIENT
Start: 2022-10-20 | End: 2022-10-21 | Stop reason: HOSPADM

## 2022-10-20 RX ORDER — TRIAMCINOLONE ACETONIDE 40 MG/ML
INJECTION, SUSPENSION INTRA-ARTICULAR; INTRAMUSCULAR PRN
Status: DISCONTINUED | OUTPATIENT
Start: 2022-10-20 | End: 2022-10-20 | Stop reason: HOSPADM

## 2022-10-20 RX ORDER — SODIUM CHLORIDE, SODIUM LACTATE, POTASSIUM CHLORIDE, CALCIUM CHLORIDE 600; 310; 30; 20 MG/100ML; MG/100ML; MG/100ML; MG/100ML
INJECTION, SOLUTION INTRAVENOUS CONTINUOUS
Status: DISCONTINUED | OUTPATIENT
Start: 2022-10-20 | End: 2022-10-21 | Stop reason: HOSPADM

## 2022-10-20 RX ORDER — ONDANSETRON 4 MG/1
4 TABLET, ORALLY DISINTEGRATING ORAL EVERY 30 MIN PRN
Status: DISCONTINUED | OUTPATIENT
Start: 2022-10-20 | End: 2022-10-21 | Stop reason: HOSPADM

## 2022-10-20 RX ORDER — HYDROMORPHONE HYDROCHLORIDE 1 MG/ML
0.2 INJECTION, SOLUTION INTRAMUSCULAR; INTRAVENOUS; SUBCUTANEOUS EVERY 5 MIN PRN
Status: DISCONTINUED | OUTPATIENT
Start: 2022-10-20 | End: 2022-10-21 | Stop reason: HOSPADM

## 2022-10-20 RX ORDER — FENTANYL CITRATE 50 UG/ML
25 INJECTION, SOLUTION INTRAMUSCULAR; INTRAVENOUS EVERY 5 MIN PRN
Status: DISCONTINUED | OUTPATIENT
Start: 2022-10-20 | End: 2022-10-21 | Stop reason: HOSPADM

## 2022-10-20 RX ORDER — DICLOFENAC SODIUM 1 MG/ML
1 SOLUTION/ DROPS OPHTHALMIC
Status: COMPLETED | OUTPATIENT
Start: 2022-10-20 | End: 2022-10-20

## 2022-10-20 RX ORDER — VALACYCLOVIR HYDROCHLORIDE 500 MG/1
500 TABLET, FILM COATED ORAL 2 TIMES DAILY
Qty: 6 TABLET | Refills: 0 | Status: SHIPPED | OUTPATIENT
Start: 2022-10-20 | End: 2022-12-14

## 2022-10-20 RX ORDER — LABETALOL HYDROCHLORIDE 5 MG/ML
10 INJECTION, SOLUTION INTRAVENOUS
Status: DISCONTINUED | OUTPATIENT
Start: 2022-10-20 | End: 2022-10-21 | Stop reason: HOSPADM

## 2022-10-20 RX ORDER — MOXIFLOXACIN IN NACL,ISO-OS/PF 0.3MG/0.3
SYRINGE (ML) INTRAOCULAR PRN
Status: DISCONTINUED | OUTPATIENT
Start: 2022-10-20 | End: 2022-10-20 | Stop reason: HOSPADM

## 2022-10-20 RX ORDER — OXYCODONE HYDROCHLORIDE 5 MG/1
5 TABLET ORAL EVERY 4 HOURS PRN
Status: DISCONTINUED | OUTPATIENT
Start: 2022-10-20 | End: 2022-10-21 | Stop reason: HOSPADM

## 2022-10-20 RX ORDER — BALANCED SALT SOLUTION 6.4; .75; .48; .3; 3.9; 1.7 MG/ML; MG/ML; MG/ML; MG/ML; MG/ML; MG/ML
SOLUTION OPHTHALMIC PRN
Status: DISCONTINUED | OUTPATIENT
Start: 2022-10-20 | End: 2022-10-20 | Stop reason: HOSPADM

## 2022-10-20 RX ORDER — ACETAMINOPHEN 325 MG/1
975 TABLET ORAL ONCE
Status: COMPLETED | OUTPATIENT
Start: 2022-10-20 | End: 2022-10-20

## 2022-10-20 RX ORDER — FENTANYL CITRATE 50 UG/ML
INJECTION, SOLUTION INTRAMUSCULAR; INTRAVENOUS PRN
Status: DISCONTINUED | OUTPATIENT
Start: 2022-10-20 | End: 2022-10-20

## 2022-10-20 RX ORDER — ONDANSETRON 2 MG/ML
4 INJECTION INTRAMUSCULAR; INTRAVENOUS EVERY 30 MIN PRN
Status: DISCONTINUED | OUTPATIENT
Start: 2022-10-20 | End: 2022-10-21 | Stop reason: HOSPADM

## 2022-10-20 RX ORDER — CYCLOPENTOLAT/TROPIC/PHENYLEPH 1%-1%-2.5%
1 DROPS (EA) OPHTHALMIC (EYE)
Status: COMPLETED | OUTPATIENT
Start: 2022-10-20 | End: 2022-10-20

## 2022-10-20 RX ORDER — LIDOCAINE HYDROCHLORIDE 10 MG/ML
INJECTION, SOLUTION EPIDURAL; INFILTRATION; INTRACAUDAL; PERINEURAL PRN
Status: DISCONTINUED | OUTPATIENT
Start: 2022-10-20 | End: 2022-10-20 | Stop reason: HOSPADM

## 2022-10-20 RX ORDER — FENTANYL CITRATE 50 UG/ML
25 INJECTION, SOLUTION INTRAMUSCULAR; INTRAVENOUS
Status: DISCONTINUED | OUTPATIENT
Start: 2022-10-20 | End: 2022-10-21 | Stop reason: HOSPADM

## 2022-10-20 RX ORDER — PROPARACAINE HYDROCHLORIDE 5 MG/ML
1 SOLUTION/ DROPS OPHTHALMIC ONCE
Status: COMPLETED | OUTPATIENT
Start: 2022-10-20 | End: 2022-10-20

## 2022-10-20 RX ORDER — PROPARACAINE HYDROCHLORIDE 5 MG/ML
1 SOLUTION/ DROPS OPHTHALMIC ONCE
Status: DISCONTINUED | OUTPATIENT
Start: 2022-10-20 | End: 2022-10-21 | Stop reason: HOSPADM

## 2022-10-20 RX ORDER — MEPERIDINE HYDROCHLORIDE 25 MG/ML
12.5 INJECTION INTRAMUSCULAR; INTRAVENOUS; SUBCUTANEOUS
Status: DISCONTINUED | OUTPATIENT
Start: 2022-10-20 | End: 2022-10-21 | Stop reason: HOSPADM

## 2022-10-20 RX ORDER — MOXIFLOXACIN 5 MG/ML
1 SOLUTION/ DROPS OPHTHALMIC
Status: COMPLETED | OUTPATIENT
Start: 2022-10-20 | End: 2022-10-20

## 2022-10-20 RX ADMIN — MOXIFLOXACIN 1 DROP: 5 SOLUTION/ DROPS OPHTHALMIC at 12:14

## 2022-10-20 RX ADMIN — SODIUM CHLORIDE, SODIUM LACTATE, POTASSIUM CHLORIDE, CALCIUM CHLORIDE: 600; 310; 30; 20 INJECTION, SOLUTION INTRAVENOUS at 12:17

## 2022-10-20 RX ADMIN — DICLOFENAC SODIUM 1 DROP: 1 SOLUTION/ DROPS OPHTHALMIC at 12:18

## 2022-10-20 RX ADMIN — Medication 1 DROP: at 12:18

## 2022-10-20 RX ADMIN — DICLOFENAC SODIUM 1 DROP: 1 SOLUTION/ DROPS OPHTHALMIC at 12:21

## 2022-10-20 RX ADMIN — ACETAMINOPHEN 975 MG: 325 TABLET ORAL at 12:11

## 2022-10-20 RX ADMIN — PROPARACAINE HYDROCHLORIDE 1 DROP: 5 SOLUTION/ DROPS OPHTHALMIC at 12:13

## 2022-10-20 RX ADMIN — FENTANYL CITRATE 50 MCG: 50 INJECTION, SOLUTION INTRAMUSCULAR; INTRAVENOUS at 13:31

## 2022-10-20 RX ADMIN — Medication 1 DROP: at 12:22

## 2022-10-20 RX ADMIN — MOXIFLOXACIN 1 DROP: 5 SOLUTION/ DROPS OPHTHALMIC at 12:18

## 2022-10-20 RX ADMIN — DICLOFENAC SODIUM 1 DROP: 1 SOLUTION/ DROPS OPHTHALMIC at 12:14

## 2022-10-20 RX ADMIN — Medication 1 DROP: at 12:14

## 2022-10-20 RX ADMIN — MOXIFLOXACIN 1 DROP: 5 SOLUTION/ DROPS OPHTHALMIC at 12:22

## 2022-10-20 NOTE — ANESTHESIA PREPROCEDURE EVALUATION
Anesthesia Pre-Procedure Evaluation    Patient: Nasreen Galarza   MRN: [id number] : 1955        Procedure : Procedure(s):  RIGHT PHACOEMULSIFICATION, CATARACT, WITH STANDARD INTRAOCULAR LENS IMPLANT INSERTION, kenalog          Past Medical History:   Diagnosis Date     Depressive disorder      Hypercholesterolemia      Migraines      Thyroid disease       Past Surgical History:   Procedure Laterality Date     ABDOMEN SURGERY      Lap band     ARTHROPLASTY HIP  2011    L hip replacement     CHOLECYSTECTOMY       COLONOSCOPY       COLONOSCOPY  2022     LUMBAR DISC SURGERY      L4-L5 diskectomy      PHACOEMULSIFICATION WITH STANDARD INTRAOCULAR LENS IMPLANT Right 10/6/2022    Procedure: RIGHT PHACOEMULSIFICATION, CATARACT, WITH STANDARD INTRAOCULAR LENS IMPLANT INSERTION, kenalog;  Surgeon: Eliot Mcdonald MD;  Location: [facility] OR     TONSILLECTOMY      Tonsillectomy      Allergies   Allergen Reactions     Penicillins Anaphylaxis     Sulfa Drugs Rash     [place] [Sulfur] Rash      Social History     Tobacco Use     Smoking status: Former     Packs/day: 2.00     Years: 18.00     Pack years: 36.00     Types: Cigarettes     Start date: 1970     Quit date: 10/18/1988     Years since quittin.0     Smokeless tobacco: Never   Substance Use Topics     Alcohol use: Yes     Comment: occ 1x/month      Wt Readings from Last 1 Encounters:   10/20/22 83 kg (183 lb)        Anesthesia Evaluation            ROS/MED HX  ENT/Pulmonary:       Neurologic:       Cardiovascular:       METS/Exercise Tolerance:     Hematologic:       Musculoskeletal:       GI/Hepatic:       Renal/Genitourinary:       Endo:     (+) thyroid problem, hypothyroidism, Obesity,     Psychiatric/Substance Use:       Infectious Disease:       Malignancy:       Other:            Physical Exam    Airway  airway exam normal           Respiratory Devices and Support         Dental  no notable dental history         Cardiovascular    cardiovascular exam normal          Pulmonary   pulmonary exam normal                OUTSIDE LABS:  CBC:   Lab Results   Component Value Date    WBC 7.3 08/26/2021    WBC 7.5 08/31/2020    HGB 12.8 08/26/2021    HGB 13.2 08/31/2020    HCT 40.4 08/26/2021    HCT 41.2 08/31/2020     08/26/2021     08/31/2020     BMP:   Lab Results   Component Value Date     08/26/2021     08/31/2020    POTASSIUM 4.1 08/26/2021    POTASSIUM 4.0 08/31/2020    CHLORIDE 107 08/26/2021    CHLORIDE 105 08/31/2020    CO2 24 08/26/2021    CO2 28 08/31/2020    BUN 16 08/26/2021    BUN 16 08/31/2020    CR 0.82 08/26/2021    CR 0.83 08/31/2020    GLC 95 08/26/2021     (H) 08/31/2020     COAGS: No results found for: PTT, INR, FIBR  POC: No results found for: BGM, HCG, HCGS  HEPATIC:   Lab Results   Component Value Date    ALBUMIN 3.2 (L) 08/26/2021    PROTTOTAL 7.3 08/26/2021    ALT 27 08/26/2021    AST 24 08/26/2021    ALKPHOS 117 08/26/2021    BILITOTAL 0.3 08/26/2021     OTHER:   Lab Results   Component Value Date    A1C 5.5 08/26/2021    NURA 9.1 08/26/2021    TSH 1.00 08/26/2021    T4 1.02 09/13/2019       Anesthesia Plan    ASA Status:  2      Anesthesia Type: MAC.     - Reason for MAC: immobility needed              Consents    Anesthesia Plan(s) and associated risks, benefits, and realistic alternatives discussed. Questions answered and patient/representative(s) expressed understanding.     - Discussed: Risks, Benefits and Alternatives for BOTH SEDATION and the PROCEDURE were discussed     - Discussed with:  Patient      - Extended Intubation/Ventilatory Support Discussed: No.      - Patient is DNR/DNI Status: No    Use of blood products discussed: No .     Postoperative Care    Pain management: Oral pain medications.   PONV prophylaxis: Ondansetron (or other 5HT-3), Dexamethasone or Solumedrol     Comments:                    Cyrus Fuller MD, MD

## 2022-10-20 NOTE — TELEPHONE ENCOUNTER
Medication is being filled for 1 time refill only due to:  Patient needs to be seen because due for physical.     Last 10/20/21  Val Arrington RN

## 2022-10-20 NOTE — ANESTHESIA POSTPROCEDURE EVALUATION
Patient: Nasreen Galarza    Procedure: Procedure(s):  LEFT PHACOEMULSIFICATION, CATARACT, WITH STANDARD INTRAOCULAR LENS IMPLANT INSERTION, kenalog       Anesthesia Type:  MAC    Note:  Disposition: Outpatient   Postop Pain Control: Uneventful            Sign Out: Well controlled pain   PONV: No   Neuro/Psych: Uneventful            Sign Out: Acceptable/Baseline neuro status   Airway/Respiratory: Uneventful            Sign Out: Acceptable/Baseline resp. status   CV/Hemodynamics: Uneventful            Sign Out: Acceptable CV status; No obvious hypovolemia; No obvious fluid overload   Other NRE: NONE   DID A NON-ROUTINE EVENT OCCUR? No           Last vitals:  Vitals Value Taken Time   /78 10/20/22 1420   Temp 36.2  C (97.1  F) 10/20/22 1420   Pulse 55 10/20/22 1420   Resp 18 10/20/22 1420   SpO2 96 % 10/20/22 1420       Electronically Signed By: Esteban Alva DO  October 20, 2022  2:37 PM

## 2022-10-20 NOTE — INTERVAL H&P NOTE
"I have reviewed the surgical (or preoperative) H&P that is linked to this encounter, and examined the patient. There are no significant changes    Clinical Conditions Present on Arrival:  Clinically Significant Risk Factors Present on Admission                    # Obesity: Estimated body mass index is 31.41 kg/m  as calculated from the following:    Height as of this encounter: 1.626 m (5' 4\").    Weight as of this encounter: 83 kg (183 lb).       "

## 2022-10-20 NOTE — OP NOTE
Operative  Report    Patient Name: Nasreen Galarza    MRN: [id number]    Date of Surgery: 10/20/2022    Surgeon: lake holman M.D    Assistant surgeon: krissy nieves MD    Preoperative Diagnosis: Visually significant cataract, left eye    Postoperative Diagnosis: Same    Procedure: Phacoemulsification with intraocular lens implant, left eye    Implant: AIM - 0.08D  Implant Name Type Inv. Item Serial No.  Lot No. LRB No. Used Action   EYE IMP IOL BLANCHE ACRYSOF UV SA60WF +20.0D - [id number] Lens/Eye Implant EYE IMP IOL BLANCHE ACRYSOF UV SA60WF +20.0D [id number] [employer]  Left 1 Implanted       Anesthesia Type: mac/topical    Estimated Blood Loss: Trace    Complications: None    INDICATIONS FOR PROCEDURE: Patient has a history of visually significant cataract affecting the patient's activity of daily living. After a discussion with the patient, the patient has elected to have the listed procedure(s) to maximize visual potential. All of the appropriate consent forms have been signed and all of the patient's questions have been answered.    DETAILS OF THE PROCEDURE: Patient was identified in the pre operative area and given pre operative eye drops. The patient was then brought into the operating room and intravenous sedation was begun after a time out was performed. The patient was prepped and draped in the usual sterile ophthalmic fashion.     A lid speculum was placed and the operating microscope was brought into position. A paracentesis was made and lidocain and viscoelastic was injected into the anterior chamber. A clear corneal incision was made using a keratome blade. A cystotome needle and Utrata forceps were used to create an anterior continuous curvilinear capsulorhexis. Then BSS on a blunt tipped cannula was used for hydrodissection and hydrodelineation. Using the phacoemulsification unit, the nucleus was then removed from the eye. Using  irrigation and aspiration, the remaining cortical material was removed from the eye. The capsular bag was infused with viscoelastic material. The intraocular lens was then placed into the capsular bag and secured into position. The remaining viscoelastic material was then removed from the eye via irrigation and aspiration. BSS on a blunt tipped cannula was used to hydrate all of the wounds. At the end of the case, the wounds were noted to be watertight, the pupil was noted to be round, the lens appeared to be in good position, and the pressure was palpated to be physiologic. Intracameral moxifloxacin and A subconjunctival injection of Kenalog were administered.     Post operative ointment was applied and the eye was patched and shielded. Patient tolerated procedure and was brought to the recover area in good condition. Patient will follow in the eye clinic in one day.     Ehsan Telles MD  Fellow, Cornea, External Disease and Refractive Surgery  Department of Ophthalmology  [facility]

## 2022-10-20 NOTE — DISCHARGE INSTRUCTIONS
Post-Operative Instructions     FIRST 24 HOURS AFTER SURGERY:  You are allowed to Tylenol (acetaminophen) 650mg every four hours as needed for pain unless you have liver disease or are allergic to Tylenol..  Continue taking your regular medications and eye drops in the non-operative eye.  Do not remove the metal or plastic shield unless otherwise instructed by your surgeon.  Do not operate a car, motorcycle, or machinery for 24 hours after surgery.  Call ED immediately if you have SEVERE PAIN unrelieved with Tylenol. And  ask for the resident on call.     MEDICATION INSTRUCTIONS:  -You will not have treatment eye drops prescription as medications were injected into your eye.  -If you feel your eyes are dry and itchy, you can use regular lubricating drops for one month after the surgery. These eye drops can be found over the counter Brand names example: Systane, Refresh. Please choose preservative free drops. To be used four times a day and as needed for 1 month  -Instilling the eye drops directly into the eye.    -If you had previously any glaucoma eye drops, You can remove the cover and instill the drops as prescribed before and after the procedure      GENERAL INSTRUCTIONS:  Hygiene of the operated eye  Wash your hands thoroughly before caring for the eye.  If the lids are sticky or itchy in the morning, debris, or matter can be gently wiped away with a cotton ball moistened with tap water. DO NOT press on the lids or eyeball.     FIVE MINUTES APART. If ointment is prescribed, it should be applied last.  If you have been taking medications in the non-operated eye, continue as they were prescribed.  If you take medications by mouth for a medical problem, continue as they were prescribed (unless otherwise instructed by physician)    EYE PROTECTION  From the time of surgery until the time of your post-operative day 1 appointment, wear the eye shield at all times. Then, wear it whenever sleeping, for 1  week.  Protective sunglasses may be worn as needed for your comfort, and are suggested for outdoor activities.    ACTIVITIES  Avoid vigorous exertion and heavy lifting for the first week after surgery  You may take a bath or shower, but avoid getting water directly into your eye for one week after surgery, usually by keeping your eyes closed during the bath.  You should discuss driving and traveling with your surgeon. You may ride in a car and fly in an airplane unless otherwise instructed.  Avoid swimming or using a hot tube/sauna/pool/lake for 2 weeks after the surgery.      WHAT TO EXPECT:  Mild irritation and discomfort are normal.    Call the doctor if you experience any of the following:  Severe eye pain  Nausea  Vomiting  Severe headache      [facility]  Home Care Following Anesthesia  For 24 hours after surgery:  Get plenty of rest.  A responsible adult must stay with you for at least 24 hours after you leave the surgery center.  Do not drive or use heavy equipment.  If you have weakness or tingling, don't drive or use heavy equipment until this feeling goes away.   Do not drink alcohol.   Avoid strenuous or risky activities.  Ask for help when climbing stairs.  You may feel lightheaded.  IF so, sit for a few minutes before standing.  Have someone help you get up.   If you have nausea (feel sick to your stomach): Drink only clear liquids such as apple juice, ginger ale, broth or 7-Up.  Rest may also help.  Be sure to drink enough fluids.  Move to a regular diet as you feel able.   You may have a slight fever.  Call the doctor if your fever is over 100 F (37.7 C) (taken under the tongue) or lasts longer than 24 hours.  You may have a dry mouth, a sore throat, muscle aches or trouble sleeping. These should go away after 24 hours.  Do not make important or legal decisions.   It is recommended to avoid smoking.               Tips for taking pain medications  To get the best  pain relief possible, remember these points:  Take pain medications as directed, before pain becomes severe.  Pain medication can upset your stomach: taking it with food may help.  Constipation is a common side effect of pain medication. Drink plenty of  fluids.  Eat foods high in fiber. Take a stool softener if recommended by your doctor or pharmacist.  Do not drink alcohol, drive or operate machinery while taking pain medications.  Ask about other ways to control pain, such as with heat, ice or relaxation.    Tylenol/Acetaminophen Consumption  To help encourage the safe use of acetaminophen, the makers of TYLENOL  have lowered the maximum daily dose for single-ingredient Extra Strength TYLENOL  (acetaminophen) products sold in the [place] from 8 pills per day (4,000 mg) to 6 pills per day (3,000 mg). The dosing interval has also changed from 2 pills every 4-6 hours to 2 pills every 6 hours.  If you feel your pain relief is insufficient, you may take Tylenol/Acetaminophen in addition to your narcotic pain medication.   Be careful not to exceed 3,000 mg of Tylenol/Acetaminophen in a 24 hour period from all sources.  If you are taking extra strength Tylenol/acetaminophen (500 mg), the maximum dose is 6 tablets in 24 hours.  If you are taking regular strength acetaminophen (325 mg), the maximum dose is 9 tablets in 24 hours.  You received 975 mg of Tylenol at 12:10 pm.  Your next dose is available after 6:10 pm.  Then follow the directions on the bottle.    Call a doctor for any of the following:  Signs of infection (fever, growing tenderness at the surgery site, a large amount of drainage or bleeding, severe pain, foul-smelling drainage, redness, swelling).  It has been over 8 to 10 hours since surgery and you are still not able to urinate (pass water).  Headache for over 24 hours.  Signs of Covid-19 infection (temperature over 100 degrees, shortness of breath, cough, loss of taste/smell, generalized body aches,  persistent headache, chills, sore throat, nausea/vomiting/diarrhea)    Your doctor is:    Dr. Eliot Mcdonald, Ophthalmology: [phone]  After Hours and Weekends dial: [phone] and ask for the resident on call for:  Ophthalmology  For emergency care, call the:  [facility]:  [phone] (TTY for hearing impaired: [phone])

## 2022-10-20 NOTE — ANESTHESIA CARE TRANSFER NOTE
Patient: Nasreen Galarza    Procedure: Procedure(s):  LEFT PHACOEMULSIFICATION, CATARACT, WITH STANDARD INTRAOCULAR LENS IMPLANT INSERTION, kenalog       Diagnosis: Cortical senile cataract of both eyes [H25.013]  Diagnosis Additional Information: No value filed.    Anesthesia Type:   MAC     Note:    Oropharynx: oropharynx clear of all foreign objects and spontaneously breathing  Level of Consciousness: awake  Oxygen Supplementation: room air    Independent Airway: airway patency satisfactory and stable  Dentition: dentition unchanged  Vital Signs Stable: post-procedure vital signs reviewed and stable  Report to RN Given: handoff report given  Patient transferred to: Phase II    Handoff Report: Identifed the Patient, Identified the Reponsible Provider, Reviewed the pertinent medical history, Discussed the surgical course, Reviewed Intra-OP anesthesia mangement and issues during anesthesia, Set expectations for post-procedure period and Allowed opportunity for questions and acknowledgement of understanding      Vitals:  Vitals Value Taken Time   /64    Temp 36.2  C (97.1  F) 10/20/22 1405   Pulse 56 10/20/22 1405   Resp 16 10/20/22 1405   SpO2 98 % 10/20/22 1405       Electronically Signed By: GEOVANNI RODRIGUEZ  October 20, 2022  2:08 PM

## 2022-10-21 ENCOUNTER — OFFICE VISIT (OUTPATIENT)
Dept: OPHTHALMOLOGY | Facility: CLINIC | Age: 67
End: 2022-10-21
Attending: STUDENT IN AN ORGANIZED HEALTH CARE EDUCATION/TRAINING PROGRAM
Payer: COMMERCIAL

## 2022-10-21 DIAGNOSIS — Z96.1 PSEUDOPHAKIA, RIGHT EYE: ICD-10-CM

## 2022-10-21 DIAGNOSIS — H25.813 COMBINED FORMS OF AGE-RELATED CATARACT OF BOTH EYES: Primary | ICD-10-CM

## 2022-10-21 DIAGNOSIS — Z96.1 PSEUDOPHAKIA, LEFT EYE: ICD-10-CM

## 2022-10-21 PROCEDURE — 99024 POSTOP FOLLOW-UP VISIT: CPT | Performed by: STUDENT IN AN ORGANIZED HEALTH CARE EDUCATION/TRAINING PROGRAM

## 2022-10-21 PROCEDURE — G0463 HOSPITAL OUTPT CLINIC VISIT: HCPCS

## 2022-10-21 ASSESSMENT — TONOMETRY
OD_IOP_MMHG: 10
IOP_METHOD: ICARE
OS_IOP_MMHG: 16

## 2022-10-21 ASSESSMENT — VISUAL ACUITY
METHOD: SNELLEN - LINEAR
OS_SC: 20/25
OD_SC: 20/25
OS_SC+: +1

## 2022-10-21 ASSESSMENT — SLIT LAMP EXAM - LIDS
COMMENTS: NORMAL
COMMENTS: NORMAL

## 2022-10-21 ASSESSMENT — EXTERNAL EXAM - LEFT EYE: OS_EXAM: NORMAL

## 2022-10-21 ASSESSMENT — EXTERNAL EXAM - RIGHT EYE: OD_EXAM: NORMAL

## 2022-10-21 NOTE — NURSING NOTE
"Chief Complaints and History of Present Illnesses   Patient presents with     Post Op (Ophthalmology) Left Eye     1 day S/P CE/IOL left eye 10/20/22.   Intracameral moxifloxacin and subconjunctival kenalog administered intraoperatively, patient not currently treated with topical gtts.   \"Yesterday went well.\" Patient states vision is good left eye. \"Now with both eyes working, it's amazing.\"     ROMINA Mathias 9:31 AM 10/21/2022       Chief Complaint(s) and History of Present Illness(es)     Post Op (Ophthalmology) Left Eye            Laterality: left eye    Onset: days ago    Associated symptoms: Negative for eye pain, flashes and floaters    Pain scale: 0/10    Comments: 1 day S/P CE/IOL left eye 10/20/22.   Intracameral moxifloxacin and subconjunctival kenalog administered intraoperatively, patient not currently treated with topical gtts.   \"Yesterday went well.\" Patient states vision is good left eye. \"Now with both eyes working, it's amazing.\"     ROMINA Mathias 9:31 AM 10/21/2022                  "

## 2022-10-21 NOTE — PROGRESS NOTES
Chief complaint   S/p CE/IOL left eye 10/20/22  s/p CE/IOL right eye 10/6/22    Initial HPI    Nasreen Galarza 67 year old female here for hypermetropia and cataract follow up. Last eye visit at [facility] about little over a year ago. Vision okay for awhile, but now getting more difficult to see clearly at reading, especially computer. Does note some glare, halos around lights after dark. Wonders if needs cataract surgery. Uses art tears a few times a week for dryness    Interval History   S/p CE/IOL left eye (10/20/22) s/p CE/IOL right eye 10/6/22 and POD1 s/p CE/IOL left eye (10/20/22).  Intracameral moxifloxacin and subconjunctival kenalog administered intraoperatively, patient not currently treated with topical gtts. No pain. Very happy with vision in both eyes. No flashes, floaters, curtains.     Past ocular history   Prior eye surgery/laser/Trauma:    - CE/IOL right eye 10/6/22   - CE/IOL left eye (10/20/22)  CTL wearer:No  Glasses : progressive  Family Hx of eye disease: No    PMH     Past Medical History:   Diagnosis Date     Depressive disorder 1996     Hypercholesterolemia      Migraines      STD (sexually transmitted disease) 1978    Herpes     Thyroid disease        PSH     Past Surgical History:   Procedure Laterality Date     ABDOMEN SURGERY  2008    Lap band     ARTHROPLASTY HIP  2011    L hip replacement     CHOLECYSTECTOMY  1996     COLONOSCOPY  2018     COLONOSCOPY  08/2022     LUMBAR DISC SURGERY  1991    L4-L5 diskectomy '91     PHACOEMULSIFICATION WITH STANDARD INTRAOCULAR LENS IMPLANT Right 10/6/2022    Procedure: RIGHT PHACOEMULSIFICATION, CATARACT, WITH STANDARD INTRAOCULAR LENS IMPLANT INSERTION, kenalog;  Surgeon: Eliot Mcdonald MD;  [place] [facility] [place]     PHACOEMULSIFICATION WITH STANDARD INTRAOCULAR LENS IMPLANT Left 10/20/2022    Procedure: LEFT PHACOEMULSIFICATION, CATARACT, WITH STANDARD INTRAOCULAR LENS IMPLANT INSERTION, kenalog;  Surgeon: Eliot Mcdonald MD;  [place] [facility]     TONSILLECTOMY  1961    Tonsillectomy       Meds     Current Outpatient Medications   Medication     atorvastatin (LIPITOR) 80 MG tablet     Fesoterodine Fumarate (TOVIAZ) 8 MG TB24     levothyroxine (SYNTHROID/LEVOTHROID) 150 MCG tablet     magnesium 500 MG TABS     Multiple Vitamins-Minerals (MULTI COMPLETE PO)     Omega-3 Fatty Acids (FISH OIL) 1200 MG capsule     PARoxetine (PAXIL) 20 MG tablet     valACYclovir (VALTREX) 500 MG tablet     No current facility-administered medications for this visit.     Drops Currently Taking   N/A    Assessment/Plan   # S/P CE/IOL left eye (10/20/22)  # CE/IOL right eye 10/6/22  - [facility]  - Intracameral moxifloxacin and subconjunctival kenalog; no drops for now  - No water in the eye  - Continue eye shield at night left eye for 1 week  - No heavy lifting, bending, straining  - Discussed RD/Endophthalmitis precautions    # Pseudophakia right eye (10/6/22)  - [facility] well[facility]  - Intracameral moxifloxacin and subconjunctival kenalog; no drops for now    Follow up: POW1 visit [place], sooner PRN    Ehsan Telles MD  Fellow, Cornea, External Disease and Refractive Surgery  Department of Ophthalmology  [facility]    Attending Physician Attestation:  Complete documentation of historical and exam elements from today's encounter can be found in the full encounter summary report (not reduplicated in this progress note).  I personally obtained the chief complaint(s) and history of present illness.  I confirmed and edited as necessary the review of systems, past medical/surgical history, family history, social history, and examination findings as documented by others; and I examined the patient myself.  I personally reviewed the relevant tests, images, and reports as documented above.  I formulated and edited as necessary the assessment and plan and discussed the findings and management plan with the patient and family. - Ehsan Telles MD

## 2022-10-28 ENCOUNTER — OFFICE VISIT (OUTPATIENT)
Dept: OPHTHALMOLOGY | Facility: CLINIC | Age: 67
End: 2022-10-28
Attending: OPHTHALMOLOGY
Payer: COMMERCIAL

## 2022-10-28 DIAGNOSIS — Z96.1 PSEUDOPHAKIA, LEFT EYE: ICD-10-CM

## 2022-10-28 DIAGNOSIS — Z96.1 PSEUDOPHAKIA, RIGHT EYE: Primary | ICD-10-CM

## 2022-10-28 PROCEDURE — 99024 POSTOP FOLLOW-UP VISIT: CPT | Mod: GC | Performed by: OPHTHALMOLOGY

## 2022-10-28 PROCEDURE — G0463 HOSPITAL OUTPT CLINIC VISIT: HCPCS

## 2022-10-28 ASSESSMENT — TONOMETRY
OD_IOP_MMHG: 09
OS_IOP_MMHG: 08
IOP_METHOD: ICARE

## 2022-10-28 ASSESSMENT — VISUAL ACUITY
OD_SC: 20/20-
METHOD: SNELLEN - LINEAR
OS_SC: 20/20-

## 2022-10-28 ASSESSMENT — EXTERNAL EXAM - RIGHT EYE: OD_EXAM: NORMAL

## 2022-10-28 ASSESSMENT — SLIT LAMP EXAM - LIDS
COMMENTS: NORMAL
COMMENTS: NORMAL

## 2022-10-28 ASSESSMENT — EXTERNAL EXAM - LEFT EYE: OS_EXAM: NORMAL

## 2022-10-28 NOTE — TELEPHONE ENCOUNTER
FS,  Please see below MyChart message and advise.  Pended endoscopy order per patient request.  Thanks,  Nicole COELLO RN

## 2022-10-28 NOTE — PROGRESS NOTES
Chief complaint   S/p CE/IOL left eye 10/20/22  s/p CE/IOL right eye 10/6/22    Initial HPI    Nasreen Galarza 67 year old female here for hypermetropia and cataract follow up. Last eye visit at [facility] about little over a year ago. Vision okay for awhile, but now getting more difficult to see clearly at reading, especially computer. Does note some glare, halos around lights after dark. Wonders if needs cataract surgery. Uses art tears a few times a week for dryness    Interval History   S/p CE/IOL left eye (10/20/22) s/p CE/IOL right eye 10/6/22 and POW1 s/p CE/IOL left eye (10/20/22).  Feels like there is a slight swelling in the left eye following surgery. Does not report any pain. Very happy with vision. No flashes, floaters, curtains.     Past ocular history   Prior eye surgery/laser/Trauma:    - CE/IOL right eye 10/6/22   - CE/IOL left eye (10/20/22)  CTL wearer:No  Glasses : progressive  Family Hx of eye disease: No    PMH     Past Medical History:   Diagnosis Date     Depressive disorder 1996     Hypercholesterolemia      Migraines      STD (sexually transmitted disease) 1978    Herpes     Thyroid disease        PSH     Past Surgical History:   Procedure Laterality Date     ABDOMEN SURGERY  2008    Lap band     ARTHROPLASTY HIP  2011    L hip replacement     CHOLECYSTECTOMY  1996     COLONOSCOPY  2018     COLONOSCOPY  08/2022     LUMBAR DISC SURGERY  1991    L4-L5 diskectomy '91     PHACOEMULSIFICATION WITH STANDARD INTRAOCULAR LENS IMPLANT Right 10/6/2022    Procedure: RIGHT PHACOEMULSIFICATION, CATARACT, WITH STANDARD INTRAOCULAR LENS IMPLANT INSERTION, kenalog;  Surgeon: Eliot Mcdonald MD;  [place] [facility] [place]     PHACOEMULSIFICATION WITH STANDARD INTRAOCULAR LENS IMPLANT Left 10/20/2022    Procedure: LEFT PHACOEMULSIFICATION, CATARACT, WITH STANDARD INTRAOCULAR LENS IMPLANT INSERTION, kenalog;  Surgeon: Eliot Mcdonald MD;  Location: [facility] [place] [place]     TONSILLECTOMY  1961    Tonsillectomy       Meds      Current Outpatient Medications   Medication     atorvastatin (LIPITOR) 80 MG tablet     Fesoterodine Fumarate (TOVIAZ) 8 MG TB24     levothyroxine (SYNTHROID/LEVOTHROID) 150 MCG tablet     magnesium 500 MG TABS     Multiple Vitamins-Minerals (MULTI COMPLETE PO)     Omega-3 Fatty Acids (FISH OIL) 1200 MG capsule     PARoxetine (PAXIL) 20 MG tablet     valACYclovir (VALTREX) 500 MG tablet     No current facility-administered medications for this visit.     Drops Currently Taking   N/A    Assessment/Plan   # S/P CE/IOL left eye (10/20/22)  # CE/IOL right eye 10/6/22  - [facility]  - Intracameral moxifloxacin and subconjunctival kenalog; no drops for now  - No water in the eye  - No heavy lifting, bending, straining  - Discussed RD/Endophthalmitis precautions    # Pseudophakia right eye (10/6/22)  - [facility]  - Intracameral moxifloxacin and subconjunctival kenalog; no drops for now    Follow up: POM1 visit [place], sooner PRN    Ehsan Telles MD  Fellow, Cornea, External Disease and Refractive Surgery  Department of Ophthalmology  [facility]    Attending Physician Attestation:  Complete documentation of historical and exam elements from today's encounter can be found in the full encounter summary report (not reduplicated in this progress note).  I personally obtained the chief complaint(s) and history of present illness.  I confirmed and edited as necessary the review of systems, past medical/surgical history, family history, social history, and examination findings as documented by others; and I examined the patient myself.  I personally reviewed the relevant tests, images, and reports as documented above.  I formulated and edited as necessary the assessment and plan and discussed the findings and management plan with the patient and family. - Eliot Mcdonald MD

## 2022-11-01 NOTE — TELEPHONE ENCOUNTER
Called [facility].  Patient was recommended to have colonoscopy with EMR.  Was scheduled for 11/18 at [facility], but was cancelled yesterday (they are not sure who cancelled it, but it was documented that patient is receiving care elsewhere)  Advanced colonoscopy was recommended because of a large colon polyp that was removed during her procedure in August.   Following up with patient to figure out where she is intending to be seen.  Nicole MOLINA RN

## 2022-11-03 NOTE — TELEPHONE ENCOUNTER
FS,    Please see [facility] message.  Referral is for both consult and procedure.  Patient wants consult part removed.      Order T'd up for procedure only.    Please advise.    Thank you,  Val Arrington RN

## 2022-11-08 DIAGNOSIS — N95.1 MENOPAUSAL SYNDROME (HOT FLASHES): ICD-10-CM

## 2022-11-08 RX ORDER — PAROXETINE 20 MG/1
20 TABLET, FILM COATED ORAL EVERY MORNING
Qty: 30 TABLET | Refills: 0 | Status: SHIPPED | OUTPATIENT
Start: 2022-11-08 | End: 2022-12-07

## 2022-11-08 NOTE — TELEPHONE ENCOUNTER
Prescription approved per [facility] Refill Protocol.    Future Appointments 11/8/2022 - 5/7/2023      Date Visit Type Length Department Provider     11/21/2022  8:30 AM RETURN [facility] 15 min P EYE GENERAL Eliot Mcdonald MD    Location Instructions:     Located in the [facility].&nbsp; Parking is available in the Patient/Visitor parking ramp located on the corner of [address] and [facility]. Due to increased security to all of the Health Science Buildings on the [facility], the entrances to [facility] and the [facility] will not be open until 7am and may have a  stationed at the entrances. Please bring this letter with you in case you are asked for proof of appointment.  PARKING OPTIONS: Eye Clinic Parking/Shuttle Options  Service Hours: Mon - Sun: 24hrs  service is available for patients with mobility limitations.  [facility] Hours: Mon - Fri: 7:00 am - 4:30 pm&nbsp; Desk Number: [phone]  Shuttle Hours: Patient/Family Shuttle Mon - Fri: 7:00 am - 7:00 pm Shuttle request number: [phone] Ramp Escort Service Mon - Sun: 2:00 pm - 8:00 pm   Arrival Process for : a. Arrive at [facility] b. Inform  staff they are going to eye clinic c. Handoff keys to  and take ticket d. Request a shuttle ride to [facility]   Arrival Process for Self-Parking: a. Park in the Patient/Visitors Ramp b. Call the shuttle request number from your personal devise and request a ride to [facility]. Wait by the elevator lobby and communicate to the request line which floor for pick-up   Exit Process for : a. Connect with Ambassador team to radio for shuttle to take back to [facility] b. Present your ticket to  and pay to retrieve the vehicle   Exit Process for Self-Parking: a. Connect with Ambassador team to radio for ramp escort to their vehicle (ramp escort is unable to accommodate any wheelchairs please  wait in the lobby while the shuttle escorts their care takers to their cars in the ramp for a quicker  cycle) b. Let  know which floor to deliver you to              12/14/2022  9:00 AM [facility] 40 min UP FAMILY PRACTICE Noemi Mayer MD    Location Instructions:     [facility] is in [address] of the General acute hospital at [address] in [place]. The building is at the intersection with [facility] and along [address] [facility]. This is the large, blue, glass building with a sculpture on the roof; please note there is no [place] signage on the exterior. Free lot parking is available.                 Ingris DOWNEY RN

## 2022-11-09 ENCOUNTER — TELEPHONE (OUTPATIENT)
Dept: GASTROENTEROLOGY | Facility: CLINIC | Age: 67
End: 2022-11-09

## 2022-11-09 NOTE — TELEPHONE ENCOUNTER
Screening Questions  BLUE  KIND OF PREP RED  LOCATION [review exclusion criteria] GREEN  SEDATION TYPE        Y Are you active on mychart?       Noemi Mayer MD in [facility] Ordering/Referring Provider?        [facility] What type of coverage do you have?      N Have you had a positive covid test in the last 90 days?     31.4 1. BMI  [BMI 40+ - review exclusion criteria]    Y  2. Are you able to give consent for your medical care? [IF NO,RN REVIEW]        N  3. Are you taking any prescription pain medications on a routine schedule?        3a. EXTENDED PREP What kind of prescription?     N 4. Do you have any chemical dependencies such as alcohol, street drugs, or methadone?    N 5. Do you have any history of post-traumatic stress syndrome, severe anxiety or history of psychosis?      **If yes 3- 5 , please schedule with MAC sedation.**          IF YES TO ANY 6 - 10 - HOSPITAL SETTING ONLY.     N 6.   Do you need assistance transferring?     N 7.   Have you had a heart or lung transplant?    N 8.   Are you currently on dialysis?   N 9.   Do you use daily home oxygen?   N 10. Do you take nitroglycerin?   10a.  If yes, how often?     11. [FEMALES]  N Are you currently pregnant?    11a.  If yes, how many weeks? [ Greater than 12 weeks, OR NEEDED]    N 12. Do you have Pulmonary Hypertension? *NEED PAC APPT AT [facility]*     N 13. [review exclusion criteria]  Do you have any implantable devices in your body (pacemaker, defib, LVAD)?    N 14. In the past 6 months, have you had any heart related issues including cardiomyopathy or heart attack?     14a.  If yes, did it require cardiac stenting if so when?     N 15. Have you had a stroke or Transient ischemic attack (TIA - aka  mini stroke ) within 6 months?      N 16. Do you have mod to severe Obstructive Sleep Apnea?  [Hospital only - Ok at [facility]]    N 17. Do you have SEVERE AND UNCONTROLLED asthma? *NEED PAC APPT AT [facility]*     N 18. Are you currently taking  "any blood thinners?     18a. If yes, inform patient to \"follow up w/ ordering provider for bridging instructions.\"    N 19. Do you take the medication Phentermine?    19a. If yes, \"Hold for 7 days before procedure.  Please consult your prescribing provider if you have questions about holding this medication.\"     N  20. Do you have chronic kidney disease?      N  21. Do you have a diagnosis of diabetes?     N  22. On a regular basis do you go 3-5 days between bowel movements?      23. Preferred LOCAL Pharmacy for Pre Prescription    [ LIST ONLY ONE PHARMACY]          [id number] - [place], [place] - [address] [place] [address]        - CLOSING REMINDERS -    Informed patient they will need an adult    Cannot take any type of public or medical transportation alone    Conscious Sedation- Needs  for 6 hours after the procedure       MAC/General-Needs  for 24 hours after procedure    Pre-Procedure Covid test to be completed [[facility] PCR Testing Required]    Confirmed Nurse will call to complete assessment       - SCHEDULING DETAILS -     Karyn  Surgeon    12/29/22  Date of Procedure  Lower Endoscopy [Colonoscopy]  Type of Procedure Scheduled   [facility]-If you answer yes to questions #8, #20, #21Which Colonoscopy Prep was Sent?     Moderate Sedation Type     N PAC / Pre-op Required         Additional comments:            "

## 2022-11-21 ENCOUNTER — OFFICE VISIT (OUTPATIENT)
Dept: OPHTHALMOLOGY | Facility: CLINIC | Age: 67
End: 2022-11-21
Attending: OPHTHALMOLOGY
Payer: COMMERCIAL

## 2022-11-21 DIAGNOSIS — H26.493 PCO (POSTERIOR CAPSULAR OPACIFICATION), BILATERAL: ICD-10-CM

## 2022-11-21 DIAGNOSIS — Z96.1 PSEUDOPHAKIA OF BOTH EYES: Primary | ICD-10-CM

## 2022-11-21 PROCEDURE — G0463 HOSPITAL OUTPT CLINIC VISIT: HCPCS | Mod: 25

## 2022-11-21 PROCEDURE — 99024 POSTOP FOLLOW-UP VISIT: CPT | Mod: GC | Performed by: OPHTHALMOLOGY

## 2022-11-21 PROCEDURE — 92015 DETERMINE REFRACTIVE STATE: CPT

## 2022-11-21 ASSESSMENT — EXTERNAL EXAM - RIGHT EYE: OD_EXAM: NORMAL

## 2022-11-21 ASSESSMENT — CONF VISUAL FIELD
OD_SUPERIOR_NASAL_RESTRICTION: 0
OS_NORMAL: 1
OS_SUPERIOR_TEMPORAL_RESTRICTION: 0
OS_SUPERIOR_NASAL_RESTRICTION: 0
OD_INFERIOR_NASAL_RESTRICTION: 0
OS_INFERIOR_TEMPORAL_RESTRICTION: 0
METHOD: COUNTING FINGERS
OD_NORMAL: 1
OS_INFERIOR_NASAL_RESTRICTION: 0
OD_SUPERIOR_TEMPORAL_RESTRICTION: 0
OD_INFERIOR_TEMPORAL_RESTRICTION: 0

## 2022-11-21 ASSESSMENT — VISUAL ACUITY
OD_PH_SC: 20/25
OS_SC: 20/25
METHOD: SNELLEN - LINEAR
OD_SC: 20/30

## 2022-11-21 ASSESSMENT — REFRACTION_MANIFEST
OS_CYLINDER: +0.50
OD_SPHERE: -1.00
OS_AXIS: 180
OS_SPHERE: -0.75
OS_ADD: +2.50
OD_AXIS: 160
OD_CYLINDER: +0.50
OD_ADD: +2.50

## 2022-11-21 ASSESSMENT — TONOMETRY
IOP_METHOD: ICARE
OD_IOP_MMHG: 09
OS_IOP_MMHG: 09

## 2022-11-21 ASSESSMENT — SLIT LAMP EXAM - LIDS
COMMENTS: NORMAL
COMMENTS: NORMAL

## 2022-11-21 ASSESSMENT — EXTERNAL EXAM - LEFT EYE: OS_EXAM: NORMAL

## 2022-11-21 ASSESSMENT — CUP TO DISC RATIO
OS_RATIO: 0.4
OD_RATIO: 0.45

## 2022-11-21 NOTE — PROGRESS NOTES
Chief complaint   S/p CE/IOL left eye 10/20/22  s/p CE/IOL right eye 10/6/22    Initial HPI    Nasreen Galarza 67 year old female here for hypermetropia and cataract follow up. Last eye visit at [facility] about little over a year ago. Vision okay for awhile, but now getting more difficult to see clearly at reading, especially computer. Does note some glare, halos around lights after dark. Wonders if needs cataract surgery. Uses art tears a few times a week for dryness    Interval History   S/p CE/IOL left eye (10/20/22) s/p CE/IOL right eye 10/6/22 and s/p CE/IOL left eye (10/20/22). Left eyelid swelling from last visit has largely resolved but does occasionally feel something weird very momentarily. Overall not bothered. Does not report any pain. Very happy with vision. No flashes, floaters, curtains.     Past ocular history   Prior eye surgery/laser/Trauma:    - CE/IOL right eye 10/6/22   - CE/IOL left eye (10/20/22)  CTL wearer:No  Glasses : progressive  Family Hx of eye disease: No    PMH     Past Medical History:   Diagnosis Date     Depressive disorder 1996     Hypercholesterolemia      Migraines      STD (sexually transmitted disease) 1978    Herpes     Thyroid disease        PSH     Past Surgical History:   Procedure Laterality Date     ABDOMEN SURGERY  2008    Lap band     ARTHROPLASTY HIP  2011    L hip replacement     CHOLECYSTECTOMY  1996     COLONOSCOPY  2018     COLONOSCOPY  08/2022     LUMBAR DISC SURGERY  1991    L4-L5 diskectomy '91     PHACOEMULSIFICATION WITH STANDARD INTRAOCULAR LENS IMPLANT Right 10/6/2022    Procedure: RIGHT PHACOEMULSIFICATION, CATARACT, WITH STANDARD INTRAOCULAR LENS IMPLANT INSERTION, kenalog;  Surgeon: Eliot Mcdonald MD;  [place] [facility] [place]     PHACOEMULSIFICATION WITH STANDARD INTRAOCULAR LENS IMPLANT Left 10/20/2022    Procedure: LEFT PHACOEMULSIFICATION, CATARACT, WITH STANDARD INTRAOCULAR LENS IMPLANT INSERTION, kenalog;  Surgeon: Eliot Mcdonald MD;  Location:  [facility] OR     TONSILLECTOMY  1961    Tonsillectomy       Meds     Current Outpatient Medications   Medication     atorvastatin (LIPITOR) 80 MG tablet     Fesoterodine Fumarate (TOVIAZ) 8 MG TB24     levothyroxine (SYNTHROID/LEVOTHROID) 150 MCG tablet     magnesium 500 MG TABS     Multiple Vitamins-Minerals (MULTI COMPLETE PO)     Omega-3 Fatty Acids (FISH OIL) 1200 MG capsule     PARoxetine (PAXIL) 20 MG tablet     valACYclovir (VALTREX) 500 MG tablet     No current facility-administered medications for this visit.     Drops Currently Taking   N/A    Assessment/Plan   # CE/IOL left eye 10/20/22  # CE/IOL right eye 10/6/22  # Myopia, presbyopia OU  - [facility] well[facility]  - Intracameral moxifloxacin and subconjunctival kenalog  - BCVA 20/15 OU - discussed and dispensed new mrx    PCO, OU   - Trace PCO OU   - Patient notices vision a little less clear than immediately after surgery  - Instructed to return in 2-3 months if bother some for YAG cap    Follow up: Annual eye exam     Blair Teixeira MD  Resident Physician - PGY3  Department of Ophthalmology   [facility]    Attending Physician Attestation:  Complete documentation of historical and exam elements from today's encounter can be found in the full encounter summary report (not reduplicated in this progress note).  I personally obtained the chief complaint(s) and history of present illness.  I confirmed and edited as necessary the review of systems, past medical/surgical history, family history, social history, and examination findings as documented by others; and I examined the patient myself.  I personally reviewed the relevant tests, images, and reports as documented above.  I formulated and edited as necessary the assessment and plan and discussed the findings and management plan with the patient and family. - Eliot Mcdonald MD

## 2022-11-21 NOTE — NURSING NOTE
Chief Complaints and History of Present Illnesses   Patient presents with     Pseudophakia Follow Up     1 month follow up both eyes     Chief Complaint(s) and History of Present Illness(es)     Pseudophakia Follow Up            Comments: 1 month follow up both eyes          Comments    Pt states vision has been good since last visit. Small throbbing sensation in LE that comes and goes in LE.  No eye pain today. No new flashes or floaters.    JERED Wilburn November 21, 2022 8:40 AM

## 2022-11-22 ENCOUNTER — TRANSFERRED RECORDS (OUTPATIENT)
Dept: HEALTH INFORMATION MANAGEMENT | Facility: CLINIC | Age: 67
End: 2022-11-22

## 2022-11-22 DIAGNOSIS — E03.9 HYPOTHYROIDISM, UNSPECIFIED TYPE: ICD-10-CM

## 2022-11-22 DIAGNOSIS — E78.5 HYPERLIPIDEMIA LDL GOAL <100: ICD-10-CM

## 2022-11-23 RX ORDER — LEVOTHYROXINE SODIUM 150 UG/1
150 TABLET ORAL DAILY
Qty: 30 TABLET | Refills: 0 | Status: SHIPPED | OUTPATIENT
Start: 2022-11-23 | End: 2022-12-14

## 2022-11-23 RX ORDER — ATORVASTATIN CALCIUM 80 MG/1
80 TABLET, FILM COATED ORAL DAILY
Qty: 30 TABLET | Refills: 0 | Status: SHIPPED | OUTPATIENT
Start: 2022-11-23 | End: 2022-12-14

## 2022-11-23 NOTE — TELEPHONE ENCOUNTER
One time refill of both sent 8/1/22.  Due for physical    Future OV 12/14   Will refill for one month.    Val Arrington RN

## 2022-12-07 ASSESSMENT — ENCOUNTER SYMPTOMS
HEMATOCHEZIA: 0
CONSTIPATION: 0
SHORTNESS OF BREATH: 0
COUGH: 1
WEAKNESS: 0
DIARRHEA: 0
JOINT SWELLING: 0
NERVOUS/ANXIOUS: 0
NAUSEA: 0
HEARTBURN: 0
HEMATURIA: 0
DYSURIA: 0
DIZZINESS: 0
PALPITATIONS: 0
HEADACHES: 0
MYALGIAS: 0
EYE PAIN: 0
ARTHRALGIAS: 1
FEVER: 0
PARESTHESIAS: 0
SORE THROAT: 0
ABDOMINAL PAIN: 0
FREQUENCY: 0
BREAST MASS: 0
CHILLS: 0

## 2022-12-07 ASSESSMENT — ANXIETY QUESTIONNAIRES
GAD7 TOTAL SCORE: 0
3. WORRYING TOO MUCH ABOUT DIFFERENT THINGS: NOT AT ALL
IF YOU CHECKED OFF ANY PROBLEMS ON THIS QUESTIONNAIRE, HOW DIFFICULT HAVE THESE PROBLEMS MADE IT FOR YOU TO DO YOUR WORK, TAKE CARE OF THINGS AT HOME, OR GET ALONG WITH OTHER PEOPLE: NOT DIFFICULT AT ALL
7. FEELING AFRAID AS IF SOMETHING AWFUL MIGHT HAPPEN: NOT AT ALL
GAD7 TOTAL SCORE: 0
7. FEELING AFRAID AS IF SOMETHING AWFUL MIGHT HAPPEN: NOT AT ALL
6. BECOMING EASILY ANNOYED OR IRRITABLE: NOT AT ALL
4. TROUBLE RELAXING: NOT AT ALL
2. NOT BEING ABLE TO STOP OR CONTROL WORRYING: NOT AT ALL
8. IF YOU CHECKED OFF ANY PROBLEMS, HOW DIFFICULT HAVE THESE MADE IT FOR YOU TO DO YOUR WORK, TAKE CARE OF THINGS AT HOME, OR GET ALONG WITH OTHER PEOPLE?: NOT DIFFICULT AT ALL
1. FEELING NERVOUS, ANXIOUS, OR ON EDGE: NOT AT ALL
GAD7 TOTAL SCORE: 0
5. BEING SO RESTLESS THAT IT IS HARD TO SIT STILL: NOT AT ALL

## 2022-12-07 ASSESSMENT — ACTIVITIES OF DAILY LIVING (ADL): CURRENT_FUNCTION: NO ASSISTANCE NEEDED

## 2022-12-07 ASSESSMENT — PATIENT HEALTH QUESTIONNAIRE - PHQ9
SUM OF ALL RESPONSES TO PHQ QUESTIONS 1-9: 3
10. IF YOU CHECKED OFF ANY PROBLEMS, HOW DIFFICULT HAVE THESE PROBLEMS MADE IT FOR YOU TO DO YOUR WORK, TAKE CARE OF THINGS AT HOME, OR GET ALONG WITH OTHER PEOPLE: SOMEWHAT DIFFICULT
SUM OF ALL RESPONSES TO PHQ QUESTIONS 1-9: 3

## 2022-12-14 ENCOUNTER — OFFICE VISIT (OUTPATIENT)
Dept: FAMILY MEDICINE | Facility: CLINIC | Age: 67
End: 2022-12-14
Payer: COMMERCIAL

## 2022-12-14 VITALS
HEART RATE: 68 BPM | TEMPERATURE: 99.3 F | OXYGEN SATURATION: 100 % | DIASTOLIC BLOOD PRESSURE: 68 MMHG | HEIGHT: 64 IN | SYSTOLIC BLOOD PRESSURE: 125 MMHG | WEIGHT: 190.9 LBS | RESPIRATION RATE: 16 BRPM | BODY MASS INDEX: 32.59 KG/M2

## 2022-12-14 DIAGNOSIS — R39.15 URINARY URGENCY: ICD-10-CM

## 2022-12-14 DIAGNOSIS — B00.9 HSV (HERPES SIMPLEX VIRUS) INFECTION: ICD-10-CM

## 2022-12-14 DIAGNOSIS — N95.1 MENOPAUSAL SYNDROME (HOT FLASHES): ICD-10-CM

## 2022-12-14 DIAGNOSIS — Z12.4 CERVICAL CANCER SCREENING: Primary | ICD-10-CM

## 2022-12-14 DIAGNOSIS — E03.9 HYPOTHYROIDISM, UNSPECIFIED TYPE: ICD-10-CM

## 2022-12-14 DIAGNOSIS — Z00.00 PREVENTATIVE HEALTH CARE: ICD-10-CM

## 2022-12-14 DIAGNOSIS — E78.5 HYPERLIPIDEMIA LDL GOAL <100: ICD-10-CM

## 2022-12-14 DIAGNOSIS — R74.8 ELEVATED ALKALINE PHOSPHATASE LEVEL: ICD-10-CM

## 2022-12-14 DIAGNOSIS — L82.1 SEBORRHEIC KERATOSIS: ICD-10-CM

## 2022-12-14 LAB
ALBUMIN SERPL BCG-MCNC: 3.9 G/DL (ref 3.5–5.2)
ALP SERPL-CCNC: 137 U/L (ref 35–104)
ALT SERPL W P-5'-P-CCNC: 19 U/L (ref 10–35)
ANION GAP SERPL CALCULATED.3IONS-SCNC: 10 MMOL/L (ref 7–15)
AST SERPL W P-5'-P-CCNC: 26 U/L (ref 10–35)
BILIRUB SERPL-MCNC: 0.3 MG/DL
BUN SERPL-MCNC: 14.9 MG/DL (ref 8–23)
CALCIUM SERPL-MCNC: 9.8 MG/DL (ref 8.8–10.2)
CHLORIDE SERPL-SCNC: 102 MMOL/L (ref 98–107)
CHOLEST SERPL-MCNC: 181 MG/DL
CREAT SERPL-MCNC: 0.77 MG/DL (ref 0.51–0.95)
DEPRECATED HCO3 PLAS-SCNC: 26 MMOL/L (ref 22–29)
ERYTHROCYTE [DISTWIDTH] IN BLOOD BY AUTOMATED COUNT: 14.1 % (ref 10–15)
GFR SERPL CREATININE-BSD FRML MDRD: 84 ML/MIN/1.73M2
GLUCOSE SERPL-MCNC: 98 MG/DL (ref 70–99)
HBA1C MFR BLD: 5.4 % (ref 0–5.6)
HCT VFR BLD AUTO: 39.8 % (ref 35–47)
HDLC SERPL-MCNC: 54 MG/DL
HGB BLD-MCNC: 12.9 G/DL (ref 11.7–15.7)
LDLC SERPL CALC-MCNC: 115 MG/DL
MCH RBC QN AUTO: 29.5 PG (ref 26.5–33)
MCHC RBC AUTO-ENTMCNC: 32.4 G/DL (ref 31.5–36.5)
MCV RBC AUTO: 91 FL (ref 78–100)
NONHDLC SERPL-MCNC: 127 MG/DL
PLATELET # BLD AUTO: 339 10E3/UL (ref 150–450)
POTASSIUM SERPL-SCNC: 4.3 MMOL/L (ref 3.4–5.3)
PROT SERPL-MCNC: 7.4 G/DL (ref 6.4–8.3)
RBC # BLD AUTO: 4.38 10E6/UL (ref 3.8–5.2)
SODIUM SERPL-SCNC: 138 MMOL/L (ref 136–145)
TRIGL SERPL-MCNC: 59 MG/DL
TSH SERPL DL<=0.005 MIU/L-ACNC: 0.53 UIU/ML (ref 0.3–4.2)
WBC # BLD AUTO: 9.1 10E3/UL (ref 4–11)

## 2022-12-14 PROCEDURE — 99214 OFFICE O/P EST MOD 30 MIN: CPT | Mod: 25 | Performed by: FAMILY MEDICINE

## 2022-12-14 PROCEDURE — 83036 HEMOGLOBIN GLYCOSYLATED A1C: CPT | Performed by: FAMILY MEDICINE

## 2022-12-14 PROCEDURE — G0438 PPPS, INITIAL VISIT: HCPCS | Performed by: FAMILY MEDICINE

## 2022-12-14 PROCEDURE — G0145 SCR C/V CYTO,THINLAYER,RESCR: HCPCS | Performed by: FAMILY MEDICINE

## 2022-12-14 PROCEDURE — 84443 ASSAY THYROID STIM HORMONE: CPT | Performed by: FAMILY MEDICINE

## 2022-12-14 PROCEDURE — 36415 COLL VENOUS BLD VENIPUNCTURE: CPT | Performed by: FAMILY MEDICINE

## 2022-12-14 PROCEDURE — 87624 HPV HI-RISK TYP POOLED RSLT: CPT | Performed by: FAMILY MEDICINE

## 2022-12-14 PROCEDURE — 80061 LIPID PANEL: CPT | Performed by: FAMILY MEDICINE

## 2022-12-14 PROCEDURE — 80053 COMPREHEN METABOLIC PANEL: CPT | Performed by: FAMILY MEDICINE

## 2022-12-14 PROCEDURE — 85027 COMPLETE CBC AUTOMATED: CPT | Performed by: FAMILY MEDICINE

## 2022-12-14 RX ORDER — FESOTERODINE FUMARATE 8 MG/1
8 TABLET, FILM COATED, EXTENDED RELEASE ORAL DAILY
Qty: 90 TABLET | Refills: 3 | Status: SHIPPED | OUTPATIENT
Start: 2022-12-14 | End: 2023-02-06

## 2022-12-14 RX ORDER — PAROXETINE 20 MG/1
20 TABLET, FILM COATED ORAL EVERY MORNING
Qty: 90 TABLET | Refills: 1 | Status: SHIPPED | OUTPATIENT
Start: 2022-12-14 | End: 2023-01-19

## 2022-12-14 RX ORDER — LEVOTHYROXINE SODIUM 150 UG/1
150 TABLET ORAL DAILY
Qty: 90 TABLET | Refills: 1 | Status: SHIPPED | OUTPATIENT
Start: 2022-12-14 | End: 2022-12-20

## 2022-12-14 RX ORDER — ATORVASTATIN CALCIUM 80 MG/1
80 TABLET, FILM COATED ORAL DAILY
Qty: 90 TABLET | Refills: 1 | Status: SHIPPED | OUTPATIENT
Start: 2022-12-14 | End: 2022-12-21

## 2022-12-14 RX ORDER — VALACYCLOVIR HYDROCHLORIDE 500 MG/1
500 TABLET, FILM COATED ORAL 2 TIMES DAILY
Qty: 6 TABLET | Refills: 4 | Status: SHIPPED | OUTPATIENT
Start: 2022-12-14

## 2022-12-14 ASSESSMENT — ENCOUNTER SYMPTOMS
HEARTBURN: 0
FREQUENCY: 0
BREAST MASS: 0
DIZZINESS: 0
PARESTHESIAS: 0
ABDOMINAL PAIN: 0
NERVOUS/ANXIOUS: 0
DYSURIA: 0
CHILLS: 0
SORE THROAT: 0
HEMATOCHEZIA: 0
EYE PAIN: 0
ARTHRALGIAS: 1
DIARRHEA: 0
WEAKNESS: 0
HEMATURIA: 0
SHORTNESS OF BREATH: 0
NAUSEA: 0
JOINT SWELLING: 0
HEADACHES: 0
FEVER: 0
MYALGIAS: 0
CONSTIPATION: 0
COUGH: 1
PALPITATIONS: 0

## 2022-12-14 ASSESSMENT — ACTIVITIES OF DAILY LIVING (ADL): CURRENT_FUNCTION: NO ASSISTANCE NEEDED

## 2022-12-14 ASSESSMENT — PAIN SCALES - GENERAL: PAINLEVEL: NO PAIN (0)

## 2022-12-14 NOTE — PROGRESS NOTES
"dexa  SUBJECTIVE:   Nasreen is a 67 year old who presents for Preventive Visit.    Patient has been advised of split billing requirements and indicates understanding: Yes  Are you in the first 12 months of your Medicare coverage?  No    Healthy Habits:     In general, how would you rate your overall health?  Good    Frequency of exercise:  2-3 days/week    Duration of exercise:  15-30 minutes    Do you usually eat at least 4 servings of fruit and vegetables a day, include whole grains    & fiber and avoid regularly eating high fat or \"junk\" foods?  No    Taking medications regularly:  Yes    Medication side effects:  Not applicable    Ability to successfully perform activities of daily living:  No assistance needed    Home Safety:  No safety concerns identified    Hearing Impairment:  Difficulty following a conversation in a noisy restaurant or crowded room, feel that people are mumbling or not speaking clearly, need to ask people to speak up or repeat themselves and difficulty understanding soft or whispered speech    In the past 6 months, have you been bothered by leaking of urine?  No    In general, how would you rate your overall mental or emotional health?  Good      PHQ-2 Total Score: 1    Additional concerns today:  Yes    Pigmented lesion on the back. Noticed it two weeks ago.   Have you ever done Advance Care Planning? (For example, a Health Directive, POLST, or a discussion with a medical provider or your loved ones about your wishes): Yes, patient states has an Advance Care Planning document and will bring a copy to the clinic.  Wt Readings from Last 4 Encounters:   12/14/22 86.6 kg (190 lb 14.4 oz)   10/20/22 83 kg (183 lb)   10/06/22 84.8 kg (187 lb)   10/03/22 85.2 kg (187 lb 12.8 oz)      Fall risk  Fallen 2 or more times in the past year?: No  Any fall with injury in the past year?: No  click delete button to remove this line now  Cognitive Screening   1) Repeat 3 items (Leader, Season, Table)    2) " Clock draw: NORMAL  3) 3 item recall: Recalls 3 objects  Results: 3 items recalled: COGNITIVE IMPAIRMENT LESS LIKELY    Mini-CogTM Copyright MODE [employer]. Licensed by the author for use in [facility]; reprinted with permission (zoey@[facility]). All rights reserved.      Do you have sleep apnea, excessive snoring or daytime drowsiness?: no    Reviewed and updated as needed this visit by clinical staff   Tobacco  Allergies  Meds              Reviewed and updated as needed this visit by Provider                 Social History     Tobacco Use     Smoking status: Former     Packs/day: 2.00     Years: 18.00     Pack years: 36.00     Types: Cigarettes     Start date: 1970     Quit date: 10/18/1988     Years since quittin.1     Smokeless tobacco: Never   Substance Use Topics     Alcohol use: Yes     Comment: occ 1x/month     If you drink alcohol do you typically have >3 drinks per day or >7 drinks per week? No    Current providers sharing in care for this patient include:   Patient Care Team:  Noemi Mayer MD as PCP - General (Family Practice)  Ubaldo Combs MD as MD (Neurology)  Maria Victoria Nair MD as Resident (Student in organized health care education/training program)  Noemi Mayer MD as Assigned PCP  Eliot Mcdonald MD as Assigned Surgical Provider    The following health maintenance items are reviewed in Epic and correct as of today:  Health Maintenance   Topic Date Due     ANNUAL REVIEW OF HM ORDERS  2022     MEDICARE ANNUAL WELLNESS VISIT  10/20/2022     PAP  2023     COLORECTAL CANCER SCREENING  2023     FALL RISK ASSESSMENT  2023     MAMMO SCREENING  2024     LIPID  2026     ADVANCE CARE PLANNING  2027     DTAP/TDAP/TD IMMUNIZATION (3 - Td or Tdap) 2032     DEXA  2033     HEPATITIS C SCREENING  Completed     PHQ-2 (once per calendar year)  Completed     INFLUENZA VACCINE  Completed     Pneumococcal Vaccine:  "65+ Years  Completed     ZOSTER IMMUNIZATION  Completed     COVID-19 Vaccine  Completed     IPV IMMUNIZATION  Aged Out     MENINGITIS IMMUNIZATION  Aged Out     Breast CA Risk Assessment (FHS-7) 9/13/2021 9/13/2021   Do you have a family history of breast, colon, or ovarian cancer? No / Unknown No / Unknown       click delete button to remove this line now  Mammogram Screening: Recommended mammography every 1-2 years with patient discussion and risk factor consideration  Pertinent mammograms are reviewed under the imaging tab.    Review of Systems   Constitutional: Negative for chills and fever.   HENT: Positive for congestion. Negative for ear pain, hearing loss and sore throat.    Eyes: Negative for pain and visual disturbance.   Respiratory: Positive for cough. Negative for shortness of breath.    Cardiovascular: Negative for chest pain, palpitations and peripheral edema.   Gastrointestinal: Negative for abdominal pain, constipation, diarrhea, heartburn, hematochezia and nausea.   Breasts:  Negative for tenderness, breast mass and discharge.   Genitourinary: Negative for dysuria, frequency, genital sores, hematuria, pelvic pain, urgency, vaginal bleeding and vaginal discharge.   Musculoskeletal: Positive for arthralgias. Negative for joint swelling and myalgias.   Skin: Negative for rash.   Neurological: Negative for dizziness, weakness, headaches and paresthesias.   Psychiatric/Behavioral: Negative for mood changes. The patient is not nervous/anxious.      OBJECTIVE:   /68   Pulse 68   Temp 99.3  F (37.4  C) (Temporal)   Resp 16   Ht 1.626 m (5' 4\")   Wt 86.6 kg (190 lb 14.4 oz)   SpO2 100%   BMI 32.77 kg/m   Estimated body mass index is 32.77 kg/m  as calculated from the following:    Height as of this encounter: 1.626 m (5' 4\").    Weight as of this encounter: 86.6 kg (190 lb 14.4 oz).  Physical Exam  GENERAL: healthy, alert and no distress  EYES: Eyes grossly normal to inspection, PERRL and " conjunctivae and sclerae normal  HENT: ear canals and TM's normal, nose and mouth without ulcers or lesions  NECK: no adenopathy, no asymmetry, masses, or scars and thyroid normal to palpation  RESP: lungs clear to auscultation - no rales, rhonchi or wheezes  BREAST: normal without masses, tenderness or nipple discharge and no palpable axillary masses or adenopathy  CV: regular rate and rhythm, normal S1 S2, no S3 or S4, no murmur, click or rub, no peripheral edema and peripheral pulses strong  ABDOMEN: soft, nontender, no hepatosplenomegaly, no masses and bowel sounds normal  MS: no gross musculoskeletal defects noted, no edema  SKIN: no suspicious lesions or rashes  NEURO: Normal strength and tone, mentation intact and speech normal  PSYCH: mentation appears normal, affect normal/bright    Diagnostic Test Results:  Labs reviewed in Epic  Results for orders placed or performed in visit on 12/14/22   Lipid panel reflex to direct LDL Fasting     Status: Abnormal   Result Value Ref Range    Cholesterol 181 <200 mg/dL    Triglycerides 59 <150 mg/dL    Direct Measure HDL 54 >=50 mg/dL    LDL Cholesterol Calculated 115 (H) <=100 mg/dL    Non HDL Cholesterol 127 <130 mg/dL    Narrative    Cholesterol  Desirable:  <200 mg/dL    Triglycerides  Normal:  Less than 150 mg/dL  Borderline High:  150-199 mg/dL  High:  200-499 mg/dL  Very High:  Greater than or equal to 500 mg/dL    Direct Measure HDL  Female:  Greater than or equal to 50 mg/dL   Male:  Greater than or equal to 40 mg/dL    LDL Cholesterol  Desirable:  <100mg/dL  Above Desirable:  100-129 mg/dL   Borderline High:  130-159 mg/dL   High:  160-189 mg/dL   Very High:  >= 190 mg/dL    Non HDL Cholesterol  Desirable:  130 mg/dL  Above Desirable:  130-159 mg/dL  Borderline High:  160-189 mg/dL  High:  190-219 mg/dL  Very High:  Greater than or equal to 220 mg/dL   Comprehensive metabolic panel     Status: Abnormal   Result Value Ref Range    Sodium 138 136 - 145 mmol/L     Potassium 4.3 3.4 - 5.3 mmol/L    Chloride 102 98 - 107 mmol/L    Carbon Dioxide (CO2) 26 22 - 29 mmol/L    Anion Gap 10 7 - 15 mmol/L    Urea Nitrogen 14.9 8.0 - 23.0 mg/dL    Creatinine 0.77 0.51 - 0.95 mg/dL    Calcium 9.8 8.8 - 10.2 mg/dL    Glucose 98 70 - 99 mg/dL    Alkaline Phosphatase 137 (H) 35 - 104 U/L    AST 26 10 - 35 U/L    ALT 19 10 - 35 U/L    Protein Total 7.4 6.4 - 8.3 g/dL    Albumin 3.9 3.5 - 5.2 g/dL    Bilirubin Total 0.3 <=1.2 mg/dL    GFR Estimate 84 >60 mL/min/1.73m2   CBC with platelets     Status: Normal   Result Value Ref Range    WBC Count 9.1 4.0 - 11.0 10e3/uL    RBC Count 4.38 3.80 - 5.20 10e6/uL    Hemoglobin 12.9 11.7 - 15.7 g/dL    Hematocrit 39.8 35.0 - 47.0 %    MCV 91 78 - 100 fL    MCH 29.5 26.5 - 33.0 pg    MCHC 32.4 31.5 - 36.5 g/dL    RDW 14.1 10.0 - 15.0 %    Platelet Count 339 150 - 450 10e3/uL   Hemoglobin A1c     Status: Normal   Result Value Ref Range    Hemoglobin A1C 5.4 0.0 - 5.6 %   TSH with free T4 reflex     Status: Normal   Result Value Ref Range    TSH 0.53 0.30 - 4.20 uIU/mL       ASSESSMENT / PLAN:   Nasreen was seen today for annual visit.    Diagnoses and all orders for this visit:    Cervical cancer screening  -     Pap Screen with HPV - recommended age 30 - 65 years    Hyperlipidemia LDL goal <100  -     atorvastatin (LIPITOR) 80 MG tablet; Take 1 tablet (80 mg) by mouth daily  -     Lipid panel reflex to direct LDL Fasting; Future  -     Lipid panel reflex to direct LDL Fasting    Hypothyroidism, unspecified type  -     levothyroxine (SYNTHROID/LEVOTHROID) 150 MCG tablet; Take 1 tablet (150 mcg) by mouth daily    Urinary urgency  -     Fesoterodine Fumarate (TOVIAZ) 8 MG TB24; Take 1 tablet (8 mg) by mouth daily    Menopausal syndrome (hot flashes)  -     PARoxetine (PAXIL) 20 MG tablet; Take 1 tablet (20 mg) by mouth every morning    HSV (herpes simplex virus) infection  -     valACYclovir (VALTREX) 500 MG tablet; Take 1 tablet (500 mg) by mouth 2 times  "daily 3 days only.    Preventative health care  -     Comprehensive metabolic panel; Future  -     CBC with platelets; Future  -     Hemoglobin A1c; Future  -     TSH with free T4 reflex; Future  -     Comprehensive metabolic panel  -     CBC with platelets  -     Hemoglobin A1c  -     TSH with free T4 reflex    Seborrheic keratosis  -     Adult Dermatology Referral; Future    Elevated alkaline phosphatase level  - repeat testing in one month    Other orders  -     REVIEW OF HEALTH MAINTENANCE PROTOCOL ORDERS        Patient has been advised of split billing requirements and indicates understanding: Yes      COUNSELING:  Reviewed preventive health counseling, as reflected in patient instructions       Regular exercise       Healthy diet/nutrition       Vision screening       Hearing screening      BMI:   Estimated body mass index is 32.77 kg/m  as calculated from the following:    Height as of this encounter: 1.626 m (5' 4\").    Weight as of this encounter: 86.6 kg (190 lb 14.4 oz).         She reports that she quit smoking about 34 years ago. Her smoking use included cigarettes. She started smoking about 52 years ago. She has a 36.00 pack-year smoking history. She has never used smokeless tobacco.      Appropriate preventive services were discussed with this patient, including applicable screening as appropriate for cardiovascular disease, diabetes, osteopenia/osteoporosis, and glaucoma.  As appropriate for age/gender, discussed screening for colorectal cancer, prostate cancer, breast cancer, and cervical cancer. Checklist reviewing preventive services available has been given to the patient.    Reviewed patients plan of care and provided an AVS. The Intermediate Care Plan ( asthma action plan, low back pain action plan, and migraine action plan) for Nasreen meets the Care Plan requirement. This Care Plan has been established and reviewed with the Patient.      Noemi Mayer MD  [facility] " [place]    Identified Health Risks:  Answers for HPI/ROS submitted by the patient on 12/7/2022  If you checked off any problems, how difficult have these problems made it for you to do your work, take care of things at home, or get along with other people?: Somewhat difficult  PHQ9 TOTAL SCORE: 3  LYNETTE 7 TOTAL SCORE: 0

## 2022-12-16 LAB
BKR LAB AP GYN ADEQUACY: NORMAL
BKR LAB AP GYN INTERPRETATION: NORMAL
BKR LAB AP HPV REFLEX: NORMAL
BKR LAB AP PREVIOUS ABNORMAL: NORMAL
PATH REPORT.COMMENTS IMP SPEC: NORMAL
PATH REPORT.COMMENTS IMP SPEC: NORMAL
PATH REPORT.RELEVANT HX SPEC: NORMAL

## 2022-12-20 ENCOUNTER — MYC MEDICAL ADVICE (OUTPATIENT)
Dept: FAMILY MEDICINE | Facility: CLINIC | Age: 67
End: 2022-12-20

## 2022-12-20 PROBLEM — Z12.4 SCREENING FOR CERVICAL CANCER: Status: ACTIVE | Noted: 2022-12-20

## 2022-12-20 LAB
HUMAN PAPILLOMA VIRUS 16 DNA: NEGATIVE
HUMAN PAPILLOMA VIRUS 18 DNA: NEGATIVE
HUMAN PAPILLOMA VIRUS FINAL DIAGNOSIS: NORMAL
HUMAN PAPILLOMA VIRUS OTHER HR: NEGATIVE

## 2022-12-20 RX ORDER — BISACODYL 5 MG
TABLET, DELAYED RELEASE (ENTERIC COATED) ORAL
Qty: 4 TABLET | Refills: 0 | Status: SHIPPED | OUTPATIENT
Start: 2022-12-20 | End: 2023-06-02

## 2022-12-21 DIAGNOSIS — E78.5 HYPERLIPIDEMIA LDL GOAL <100: ICD-10-CM

## 2022-12-21 RX ORDER — ATORVASTATIN CALCIUM 80 MG/1
80 TABLET, FILM COATED ORAL DAILY
Qty: 90 TABLET | Refills: 1 | Status: SHIPPED | OUTPATIENT
Start: 2022-12-21 | End: 2023-04-10

## 2022-12-21 NOTE — TELEPHONE ENCOUNTER
"Requested Prescriptions   Pending Prescriptions Disp Refills     atorvastatin (LIPITOR) 80 MG tablet 90 tablet 1     Sig: Take 1 tablet (80 mg) by mouth daily       Statins Protocol Passed - 12/21/2022  7:42 AM        Passed - LDL on file in past 12 months     Recent Labs   Lab Test 12/14/22  0937   *             Passed - No abnormal creatine kinase in past 12 months     No lab results found.             Passed - Recent (12 mo) or future (30 days) visit within the authorizing provider's specialty     Patient has had an office visit with the authorizing provider or a provider within the authorizing providers department within the previous 12 mos or has a future within next 30 days. See \"Patient Info\" tab in inbasket, or \"Choose Columns\" in Meds & Orders section of the refill encounter.              Passed - Medication is active on med list        Passed - Patient is age 18 or older        Passed - No active pregnancy on record        Passed - No positive pregnancy test in past 12 months         Prescription approved per [facility] Refill Protocol.    Marci French RN  [facility]   "

## 2022-12-22 DIAGNOSIS — E66.01 MORBID OBESITY (H): Primary | ICD-10-CM

## 2022-12-22 DIAGNOSIS — Z13.820 SCREENING FOR OSTEOPOROSIS: ICD-10-CM

## 2022-12-22 DIAGNOSIS — Z78.0 POSTMENOPAUSAL STATE: ICD-10-CM

## 2022-12-22 NOTE — TELEPHONE ENCOUNTER
F.S.,  Please see below [employer] message and advise.  Does not appear Dexa is due until 2033, unless otherwise recommended?  Thanks,  Ingris DOWNEY RN

## 2022-12-26 ENCOUNTER — MYC MEDICAL ADVICE (OUTPATIENT)
Dept: FAMILY MEDICINE | Facility: CLINIC | Age: 67
End: 2022-12-26

## 2022-12-29 ENCOUNTER — HOSPITAL ENCOUNTER (OUTPATIENT)
Facility: CLINIC | Age: 67
Discharge: HOME | End: 2022-12-29
Attending: INTERNAL MEDICINE | Admitting: INTERNAL MEDICINE
Payer: COMMERCIAL

## 2022-12-29 VITALS
WEIGHT: 184 LBS | DIASTOLIC BLOOD PRESSURE: 95 MMHG | BODY MASS INDEX: 31.58 KG/M2 | OXYGEN SATURATION: 95 % | RESPIRATION RATE: 9 BRPM | SYSTOLIC BLOOD PRESSURE: 131 MMHG | HEART RATE: 59 BPM

## 2022-12-29 DIAGNOSIS — Z12.11 ENCOUNTER FOR SCREENING COLONOSCOPY: Primary | ICD-10-CM

## 2022-12-29 LAB — COLONOSCOPY: NORMAL

## 2022-12-29 PROCEDURE — G0121 COLON CA SCRN NOT HI RSK IND: HCPCS | Performed by: INTERNAL MEDICINE

## 2022-12-29 PROCEDURE — 258N000003 HC RX IP 258 OP 636: Performed by: INTERNAL MEDICINE

## 2022-12-29 PROCEDURE — 250N000011 HC RX IP 250 OP 636: Performed by: INTERNAL MEDICINE

## 2022-12-29 PROCEDURE — 45378 DIAGNOSTIC COLONOSCOPY: CPT | Performed by: INTERNAL MEDICINE

## 2022-12-29 PROCEDURE — G0500 MOD SEDAT ENDO SERVICE >5YRS: HCPCS | Performed by: INTERNAL MEDICINE

## 2022-12-29 RX ORDER — FENTANYL CITRATE 50 UG/ML
INJECTION, SOLUTION INTRAMUSCULAR; INTRAVENOUS PRN
Status: DISCONTINUED | OUTPATIENT
Start: 2022-12-29 | End: 2022-12-29 | Stop reason: HOSPADM

## 2022-12-29 RX ORDER — SODIUM CHLORIDE 9 MG/ML
INJECTION, SOLUTION INTRAVENOUS CONTINUOUS PRN
Status: DISCONTINUED | OUTPATIENT
Start: 2022-12-29 | End: 2022-12-29 | Stop reason: HOSPADM

## 2022-12-29 ASSESSMENT — ACTIVITIES OF DAILY LIVING (ADL)
ADLS_ACUITY_SCORE: 35
ADLS_ACUITY_SCORE: 35

## 2022-12-29 NOTE — H&P
[facility]  Pre-Endoscopy History and Physical     Nasreen Galarza MRN# [id number]   YOB: 1955 Age: 67 year old     Date of Procedure: 12/29/2022  Primary care provider: Noemi Mayer  Type of Endoscopy: colonoscopy  Reason for Procedure: Survillance  Type of Anesthesia Anticipated: Moderate Sedation    HPI:    Nasreen is a 67 year old female who will be undergoing the above procedure.      A history and physical has been performed. The patient's medications and allergies have been reviewed. The risks and benefits of the procedure and the sedation options and risks were discussed with the patient.  All questions were answered and informed consent was obtained.      She denies a personal or family history of anesthesia complications or bleeding disorders.     Allergies   Allergen Reactions     Penicillins Anaphylaxis     Sulfa Drugs Rash     Brownell [Sulfur] Rash        Prior to Admission Medications   Prescriptions Last Dose Informant Patient Reported? Taking?   Fesoterodine Fumarate (TOVIAZ) 8 MG TB24 12/28/2022  No Yes   Sig: Take 1 tablet (8 mg) by mouth daily   Multiple Vitamins-Minerals (MULTI COMPLETE PO) 12/28/2022  Yes Yes   Omega-3 Fatty Acids (FISH OIL) 1200 MG capsule 12/28/2022  Yes Yes   Sig: Take 2,400 mg by mouth daily   PARoxetine (PAXIL) 20 MG tablet 12/28/2022  No Yes   Sig: Take 1 tablet (20 mg) by mouth every morning   atorvastatin (LIPITOR) 80 MG tablet 12/28/2022  No Yes   Sig: Take 1 tablet (80 mg) by mouth daily   levothyroxine (SYNTHROID/LEVOTHROID) 150 MCG tablet 12/28/2022  No Yes   Sig: Take 1 tablet (150 mcg) by mouth daily   magnesium 500 MG TABS Past Month  Yes Yes   Sig: Take 500 mg by mouth daily   valACYclovir (VALTREX) 500 MG tablet More than a month  No Yes   Sig: Take 1 tablet (500 mg) by mouth 2 times daily 3 days only.      Facility-Administered Medications: None       Patient Active Problem List   Diagnosis     Migraine with aura and  without status migrainosus, not intractable     Hypothyroidism, unspecified type     Menopausal syndrome (hot flashes)     HSV (herpes simplex virus) infection     Hyperlipidemia LDL goal <100     Morbid obesity (H)     Cortical senile cataract of both eyes     Screening for cervical cancer        Past Medical History:   Diagnosis Date     Depressive disorder      Hypercholesterolemia      Migraines      STD (sexually transmitted disease)     Herpes     Thyroid disease         Past Surgical History:   Procedure Laterality Date     ABDOMEN SURGERY      Lap band     ARTHROPLASTY HIP      L hip replacement     CHOLECYSTECTOMY       COLONOSCOPY       COLONOSCOPY  2022     LUMBAR DISC SURGERY      L4-L5 diskectomy      PHACOEMULSIFICATION WITH STANDARD INTRAOCULAR LENS IMPLANT Right 10/6/2022    Procedure: RIGHT PHACOEMULSIFICATION, CATARACT, WITH STANDARD INTRAOCULAR LENS IMPLANT INSERTION, kenalog;  Surgeon: Eliot Mcdonald MD;  Location: [facility] OR     PHACOEMULSIFICATION WITH STANDARD INTRAOCULAR LENS IMPLANT Left 10/20/2022    Procedure: LEFT PHACOEMULSIFICATION, CATARACT, WITH STANDARD INTRAOCULAR LENS IMPLANT INSERTION, kenalog;  Surgeon: Eliot Mcdonald MD;  Location: [facility] OR     TONSILLECTOMY      Tonsillectomy       Social History     Tobacco Use     Smoking status: Former     Packs/day: 2.00     Years: 18.00     Pack years: 36.00     Types: Cigarettes     Start date: 1970     Quit date: 10/18/1988     Years since quittin.2     Smokeless tobacco: Never   Substance Use Topics     Alcohol use: Yes     Comment: occ 1x/month       Family History   Adopted: Yes   Problem Relation Age of Onset     Unknown/Adopted No family hx of      Glaucoma No family hx of      Macular Degeneration No family hx of        REVIEW OF SYSTEMS:     5 point ROS negative except as noted above in HPI, including Gen., Resp., CV, GI &  system review.      PHYSICAL EXAM:   BP (!) 146/91    "Resp 16   Wt 83.5 kg (184 lb)   SpO2 94%   BMI 31.58 kg/m   Estimated body mass index is 31.58 kg/m  as calculated from the following:    Height as of 12/14/22: 1.626 m (5' 4\").    Weight as of this encounter: 83.5 kg (184 lb).   GENERAL APPEARANCE: healthy, alert and no distress  MENTAL STATUS: alert  AIRWAY EXAM: Mallampatti Class I (visualization of the soft palate, fauces, uvula, anterior and posterior pillars)  RESP: lungs clear to auscultation - no rales, rhonchi or wheezes  CV: normal S1 S2, no S3 or S4      DIAGNOSTICS:    Not indicated      IMPRESSION   ASA Class 2 - Mild systemic disease        PLAN:       Plan for colonoscopy. We discussed the risks, benefits and alternatives and the patient wished to proceed.    The above has been forwarded to the consulting provider.      Signed Electronically by: Clement Boss MD,MD  December 29, 2022      [facility]  Ph: [phone] Fax: [phone]    "

## 2023-01-17 DIAGNOSIS — N95.1 MENOPAUSAL SYNDROME (HOT FLASHES): ICD-10-CM

## 2023-01-19 RX ORDER — PAROXETINE 20 MG/1
20 TABLET, FILM COATED ORAL EVERY MORNING
Qty: 90 TABLET | Refills: 1 | Status: SHIPPED | OUTPATIENT
Start: 2023-01-19 | End: 2023-07-06

## 2023-01-19 NOTE — TELEPHONE ENCOUNTER
"Requested Prescriptions   Pending Prescriptions Disp Refills     PARoxetine (PAXIL) 20 MG tablet 90 tablet 1     Sig: Take 1 tablet (20 mg) by mouth every morning       SSRIs Protocol Passed - 1/17/2023  4:49 PM        Passed - Recent (12 mo) or future (30 days) visit within the authorizing provider's specialty     Patient has had an office visit with the authorizing provider or a provider within the authorizing providers department within the previous 12 mos or has a future within next 30 days. See \"Patient Info\" tab in inbasket, or \"Choose Columns\" in Meds & Orders section of the refill encounter.              Passed - Medication is active on med list        Passed - Patient is age 18 or older        Passed - No active pregnancy on record        Passed - No positive pregnancy test in last 12 months             Prescription approved per [facility] Refill Protocol.    Marci French RN  [place]   "

## 2023-01-27 ENCOUNTER — TRANSFERRED RECORDS (OUTPATIENT)
Dept: HEALTH INFORMATION MANAGEMENT | Facility: CLINIC | Age: 68
End: 2023-01-27

## 2023-02-01 ENCOUNTER — MYC MEDICAL ADVICE (OUTPATIENT)
Dept: FAMILY MEDICINE | Facility: CLINIC | Age: 68
End: 2023-02-01
Payer: COMMERCIAL

## 2023-02-01 DIAGNOSIS — R39.15 URINARY URGENCY: ICD-10-CM

## 2023-02-02 RX ORDER — FESOTERODINE FUMARATE 8 MG/1
8 TABLET, FILM COATED, EXTENDED RELEASE ORAL DAILY
Qty: 90 TABLET | Refills: 3 | Status: CANCELLED | OUTPATIENT
Start: 2023-02-02

## 2023-02-03 ENCOUNTER — LAB (OUTPATIENT)
Dept: LAB | Facility: CLINIC | Age: 68
End: 2023-02-03
Payer: COMMERCIAL

## 2023-02-03 DIAGNOSIS — R74.8 ELEVATED ALKALINE PHOSPHATASE LEVEL: ICD-10-CM

## 2023-02-03 LAB
ALBUMIN SERPL BCG-MCNC: 3.7 G/DL (ref 3.5–5.2)
ALP SERPL-CCNC: 142 U/L (ref 35–104)
ALT SERPL W P-5'-P-CCNC: 18 U/L (ref 10–35)
ANION GAP SERPL CALCULATED.3IONS-SCNC: 14 MMOL/L (ref 7–15)
AST SERPL W P-5'-P-CCNC: 20 U/L (ref 10–35)
BILIRUB SERPL-MCNC: 0.2 MG/DL
BUN SERPL-MCNC: 30.1 MG/DL (ref 8–23)
CALCIUM SERPL-MCNC: 9.6 MG/DL (ref 8.8–10.2)
CHLORIDE SERPL-SCNC: 105 MMOL/L (ref 98–107)
CREAT SERPL-MCNC: 0.78 MG/DL (ref 0.51–0.95)
DEPRECATED HCO3 PLAS-SCNC: 22 MMOL/L (ref 22–29)
GFR SERPL CREATININE-BSD FRML MDRD: 83 ML/MIN/1.73M2
GLUCOSE SERPL-MCNC: 109 MG/DL (ref 70–99)
POTASSIUM SERPL-SCNC: 4.9 MMOL/L (ref 3.4–5.3)
PROT SERPL-MCNC: 6.9 G/DL (ref 6.4–8.3)
SODIUM SERPL-SCNC: 141 MMOL/L (ref 136–145)

## 2023-02-03 PROCEDURE — 36415 COLL VENOUS BLD VENIPUNCTURE: CPT

## 2023-02-03 PROCEDURE — 84080 ASSAY ALKALINE PHOSPHATASES: CPT | Mod: 90

## 2023-02-03 PROCEDURE — 82977 ASSAY OF GGT: CPT

## 2023-02-03 PROCEDURE — 99000 SPECIMEN HANDLING OFFICE-LAB: CPT

## 2023-02-03 PROCEDURE — 80053 COMPREHEN METABOLIC PANEL: CPT | Mod: 90

## 2023-02-06 DIAGNOSIS — R74.8 ELEVATED ALKALINE PHOSPHATASE LEVEL: Primary | ICD-10-CM

## 2023-02-06 DIAGNOSIS — R39.15 URINARY URGENCY: ICD-10-CM

## 2023-02-06 LAB — GGT SERPL-CCNC: 17 U/L (ref 5–36)

## 2023-02-06 RX ORDER — FESOTERODINE FUMARATE 8 MG/1
8 TABLET, FILM COATED, EXTENDED RELEASE ORAL DAILY
Qty: 90 TABLET | Refills: 3 | Status: SHIPPED | OUTPATIENT
Start: 2023-02-06 | End: 2023-07-10

## 2023-02-07 NOTE — RESULT ENCOUNTER NOTE
Dear Nasreen,   Your results revealed a mild increase in your alkaline phosphatase.  I added an alkaline phosphatase isoenzymes to your blood work from 3 days ago to identify the source of this elevation.    Best Regards  Noemi Mayer MD

## 2023-02-10 LAB
ALP BONE SERPL-CCNC: 60 U/L
ALP LIVER SERPL-CCNC: 83 U/L
ALP OTHER SERPL-CCNC: 0 U/L
ALP SERPL-CCNC: 143 U/L

## 2023-02-13 DIAGNOSIS — E55.9 VITAMIN D DEFICIENCY: ICD-10-CM

## 2023-02-13 DIAGNOSIS — R74.8 ELEVATED ALKALINE PHOSPHATASE LEVEL: Primary | ICD-10-CM

## 2023-02-13 NOTE — RESULT ENCOUNTER NOTE
Dear Nasreen,   Your results revealed a mild elevation in your bone alkaline phosphatase.  Causes for alkaline phosphatase bone isoenzyme elevation includes primary hyperparathyroidism, Paget's disease or vitamin D deficiency.  I ordered PTH and vitamin D levels.  If the alkaline phosphatase continues to increase we will order a nuclear medicine bone scan.    Please schedule a lab only visit.     Best Regards  Noemi Mayer MD

## 2023-02-16 ENCOUNTER — LAB (OUTPATIENT)
Dept: LAB | Facility: CLINIC | Age: 68
End: 2023-02-16
Payer: COMMERCIAL

## 2023-02-16 DIAGNOSIS — R74.8 ELEVATED ALKALINE PHOSPHATASE LEVEL: ICD-10-CM

## 2023-02-16 DIAGNOSIS — E55.9 VITAMIN D DEFICIENCY: ICD-10-CM

## 2023-02-16 LAB
DEPRECATED CALCIDIOL+CALCIFEROL SERPL-MC: 22 UG/L (ref 20–75)
PTH-INTACT SERPL-MCNC: 40 PG/ML (ref 15–65)

## 2023-02-16 PROCEDURE — 82306 VITAMIN D 25 HYDROXY: CPT

## 2023-02-16 PROCEDURE — 83970 ASSAY OF PARATHORMONE: CPT

## 2023-02-16 PROCEDURE — 36415 COLL VENOUS BLD VENIPUNCTURE: CPT

## 2023-02-24 ENCOUNTER — TRANSFERRED RECORDS (OUTPATIENT)
Dept: HEALTH INFORMATION MANAGEMENT | Facility: CLINIC | Age: 68
End: 2023-02-24

## 2023-03-10 ENCOUNTER — HOSPITAL ENCOUNTER (OUTPATIENT)
Dept: BONE DENSITY | Facility: CLINIC | Age: 68
Discharge: HOME | End: 2023-03-10
Attending: FAMILY MEDICINE
Payer: COMMERCIAL

## 2023-03-10 DIAGNOSIS — Z78.0 POSTMENOPAUSAL STATE: ICD-10-CM

## 2023-03-10 PROCEDURE — 77080 DXA BONE DENSITY AXIAL: CPT

## 2023-03-24 ENCOUNTER — HOSPITAL ENCOUNTER (OUTPATIENT)
Dept: NUCLEAR MEDICINE | Facility: CLINIC | Age: 68
Setting detail: NUCLEAR MEDICINE
Discharge: HOME | End: 2023-03-24
Attending: FAMILY MEDICINE
Payer: COMMERCIAL

## 2023-03-24 DIAGNOSIS — R74.8 ELEVATED ALKALINE PHOSPHATASE LEVEL: ICD-10-CM

## 2023-03-24 PROCEDURE — 343N000001 HC RX 343: Performed by: FAMILY MEDICINE

## 2023-03-24 PROCEDURE — 78306 BONE IMAGING WHOLE BODY: CPT

## 2023-03-24 PROCEDURE — A9503 TC99M MEDRONATE: HCPCS | Performed by: FAMILY MEDICINE

## 2023-03-24 RX ORDER — TC 99M MEDRONATE 20 MG/10ML
25 INJECTION, POWDER, LYOPHILIZED, FOR SOLUTION INTRAVENOUS ONCE
Status: COMPLETED | OUTPATIENT
Start: 2023-03-24 | End: 2023-03-24

## 2023-03-24 RX ADMIN — TC 99M MEDRONATE 26.7 MILLICURIE: 20 INJECTION, POWDER, LYOPHILIZED, FOR SOLUTION INTRAVENOUS at 12:05

## 2023-03-25 NOTE — RESULT ENCOUNTER NOTE
Dear Nasreen,   Here are your recent results. All results are within normal limits. They noted degenerative changes in shoulders, wrists, knees and feet. This is consistent with arthritis.     Best Regards   Noemi Mayer MD

## 2023-04-03 ENCOUNTER — OFFICE VISIT (OUTPATIENT)
Dept: DERMATOLOGY | Facility: CLINIC | Age: 68
End: 2023-04-03
Payer: COMMERCIAL

## 2023-04-03 DIAGNOSIS — D18.01 CHERRY ANGIOMA: ICD-10-CM

## 2023-04-03 DIAGNOSIS — L82.1 SEBORRHEIC KERATOSIS: ICD-10-CM

## 2023-04-03 DIAGNOSIS — L25.9 CONTACT DERMATITIS, UNSPECIFIED CONTACT DERMATITIS TYPE, UNSPECIFIED TRIGGER: Primary | ICD-10-CM

## 2023-04-03 PROCEDURE — 99202 OFFICE O/P NEW SF 15 MIN: CPT | Mod: GC | Performed by: DERMATOLOGY

## 2023-04-03 RX ORDER — BETAMETHASONE DIPROPIONATE 0.5 MG/G
OINTMENT, AUGMENTED TOPICAL 2 TIMES DAILY
Qty: 50 G | Refills: 3 | Status: SHIPPED | OUTPATIENT
Start: 2023-04-03 | End: 2024-01-15

## 2023-04-03 RX ORDER — FAMOTIDINE 20 MG
TABLET ORAL
COMMUNITY
Start: 2023-02-12

## 2023-04-03 RX ORDER — TRETINOIN 0.5 MG/G
CREAM TOPICAL AT BEDTIME
COMMUNITY

## 2023-04-03 ASSESSMENT — PAIN SCALES - GENERAL: PAINLEVEL: NO PAIN (0)

## 2023-04-03 NOTE — LETTER
4/3/2023       RE: Nasreen Galarza  [address]  [place] [address]     Dear Colleague,    Thank you for referring your patient, Nasreen Galarza, to the [facility]ERMAT[facility] [place] [facility]. Please see a copy of my visit note below.    [facility] Dermatology Note  Encounter Date: Apr 3, 2023  Office Visit     Dermatology Problem List:  1. Dermatitis, suspect ACD  - Current tx: Augmented betamethasone dipropionate 0.05% ointment  2. Seborrheic Keratosis    ____________________________________________    Assessment & Plan:     1. Dermatitis, left neck  Patient notes a reaction on her left neck that has been ongoing for several months and is quite irritating for her.  On examination we do see a pink erythematous plaque without prominent scale.  Overall given the location, recurrence, and history of this suspect this is likely an allergic contact dermatitis reaction.  We recommend treatment with topical steroids and to continue to monitor lesion.  - Augmented betamethasone dipropionate 0.5% ointment twice daily until resolution    2. Seborrheic Keratosis   Reassured regarding benign etiology of the lesion.  Offered liquid nitrogen cryotherapy if lesions are irritating to the patient.  However patient also the lesion does not bother her, and she would not like any treatment for this.  - No intervention indicated    Procedures Performed:   N/A    Follow-up: prn for new or changing lesions    Staff and Resident:     Nayan Paige MD  Internal Medicine - Dermatology (PGY-2)     Staff Physician Comments:   I saw and evaluated the patient with the resident and I edited the assessment and plan as documented in the note. I was present for the examination.    Braden Salazar MD   of Dermatology  Department of Dermatology  [facility]  Medicine      ____________________________________________    CC: Skin Check (Nasreen is here today due to 2 lesions of concern )    HPI:  Ms. Nasreen Galarza is a(n) 67 year old female who presents today for evaluation of a rash.  Patient notes that for the past several months she has had a rash on the left neck.  She notes prior trauma to the area several years prior, but no other inciting event.  Patient has not use any medicine for this yet.  Patient also noted brown lesions on her back that she has noted recently. No other new, painful, growing, tender lesions of concern.    Spot on neck - appeared about 3 weeks ago - became itchy - has enlarged and become more itchy    Brown spot on back    Labs Reviewed:  N/A    Physical Exam:  Vitals: There were no vitals taken for this visit.  SKIN:   -On the left neck there is a ~1 cm raised pink plaque without significant scale.  No nodularity or mass felt underneath to palpation.  - tan to light brown waxy stuck on papules and plaques on back, chest, abdomen, extremities  - No other lesions of concern on areas examined.     Medications:  Current Outpatient Medications   Medication    atorvastatin (LIPITOR) 80 MG tablet    Fesoterodine Fumarate (TOVIAZ) 8 MG TB24    levothyroxine (SYNTHROID/LEVOTHROID) 150 MCG tablet    magnesium 500 MG TABS    Multiple Vitamins-Minerals (MULTI COMPLETE PO)    Omega-3 Fatty Acids (FISH OIL) 1200 MG capsule    PARoxetine (PAXIL) 20 MG tablet    tretinoin (RETIN-A) 0.05 % external cream    valACYclovir (VALTREX) 500 MG tablet    Vitamin D, Cholecalciferol, 25 MCG (1000 UT) CAPS    bisacodyl (DULCOLAX) 5 MG EC tablet    polyethylene glycol (GOLYTELY) 236 g suspension     No current facility-administered medications for this visit.      Past Medical History:   Patient Active Problem List   Diagnosis    Migraine with aura and without status migrainosus, not intractable    Hypothyroidism, unspecified type    Menopausal syndrome (hot flashes)    HSV  (herpes simplex virus) infection    Hyperlipidemia LDL goal <100    Morbid obesity (H)    Cortical senile cataract of both eyes    Screening for cervical cancer     Past Medical History:   Diagnosis Date    Depressive disorder 1996    Hypercholesterolemia     Migraines     STD (sexually transmitted disease) 1978    Herpes    Thyroid disease        CC No referring provider defined for this encounter. on close of this encounter.

## 2023-04-03 NOTE — PROGRESS NOTES
[facility] Dermatology Note  Encounter Date: Apr 3, 2023  Office Visit     Dermatology Problem List:  1. Dermatitis, suspect ACD  - Current tx: Augmented betamethasone dipropionate 0.05% ointment  2. Seborrheic Keratosis    ____________________________________________    Assessment & Plan:     1. Dermatitis, left neck  Patient notes a reaction on her left neck that has been ongoing for several months and is quite irritating for her.  On examination we do see a pink erythematous plaque without prominent scale.  Overall given the location, recurrence, and history of this suspect this is likely an allergic contact dermatitis reaction.  We recommend treatment with topical steroids and to continue to monitor lesion.  - Augmented betamethasone dipropionate 0.5% ointment twice daily until resolution    2. Seborrheic Keratosis   Reassured regarding benign etiology of the lesion.  Offered liquid nitrogen cryotherapy if lesions are irritating to the patient.  However patient also the lesion does not bother her, and she would not like any treatment for this.  - No intervention indicated    Procedures Performed:   N/A    Follow-up: prn for new or changing lesions    Staff and Resident:     Nayan Paige MD  Internal Medicine - Dermatology (PGY-2)     Staff Physician Comments:   I saw and evaluated the patient with the resident and I edited the assessment and plan as documented in the note. I was present for the examination.    Braden Salazar MD   of Dermatology  Department of Dermatology  [facility]      ____________________________________________    CC: Skin Check (Nasreen is here today due to 2 lesions of concern )    HPI:  Ms. Nasreen Galarza is a(n) 67 year old female who presents today for evaluation of a rash.  Patient notes that for the past several months she has had a rash on the left neck.  She notes prior trauma to the area several years  prior, but no other inciting event.  Patient has not use any medicine for this yet.  Patient also noted brown lesions on her back that she has noted recently. No other new, painful, growing, tender lesions of concern.    Spot on neck - appeared about 3 weeks ago - became itchy - has enlarged and become more itchy    Brown spot on back    Labs Reviewed:  N/A    Physical Exam:  Vitals: There were no vitals taken for this visit.  SKIN:   -On the left neck there is a ~1 cm raised pink plaque without significant scale.  No nodularity or mass felt underneath to palpation.  - tan to light brown waxy stuck on papules and plaques on back, chest, abdomen, extremities  - No other lesions of concern on areas examined.     Medications:  Current Outpatient Medications   Medication     atorvastatin (LIPITOR) 80 MG tablet     Fesoterodine Fumarate (TOVIAZ) 8 MG TB24     levothyroxine (SYNTHROID/LEVOTHROID) 150 MCG tablet     magnesium 500 MG TABS     Multiple Vitamins-Minerals (MULTI COMPLETE PO)     Omega-3 Fatty Acids (FISH OIL) 1200 MG capsule     PARoxetine (PAXIL) 20 MG tablet     tretinoin (RETIN-A) 0.05 % external cream     valACYclovir (VALTREX) 500 MG tablet     Vitamin D, Cholecalciferol, 25 MCG (1000 UT) CAPS     bisacodyl (DULCOLAX) 5 MG EC tablet     polyethylene glycol (GOLYTELY) 236 g suspension     No current facility-administered medications for this visit.      Past Medical History:   Patient Active Problem List   Diagnosis     Migraine with aura and without status migrainosus, not intractable     Hypothyroidism, unspecified type     Menopausal syndrome (hot flashes)     HSV (herpes simplex virus) infection     Hyperlipidemia LDL goal <100     Morbid obesity (H)     Cortical senile cataract of both eyes     Screening for cervical cancer     Past Medical History:   Diagnosis Date     Depressive disorder 1996     Hypercholesterolemia      Migraines      STD (sexually transmitted disease) 1978    Herpes     Thyroid  disease        CC No referring provider defined for this encounter. on close of this encounter.

## 2023-04-03 NOTE — PROGRESS NOTES
Spot on neck - appeared about 3 weeks ago - became itchy - has enlarged and become more itchy    Brown spot on back

## 2023-04-03 NOTE — NURSING NOTE
Dermatology Rooming Note    Nasreen Galarza's goals for this visit include:   Chief Complaint   Patient presents with     Skin Check     Nasreen is here today due to 2 lesions of concern      Tramaine MOLINA CMA

## 2023-04-06 ENCOUNTER — TRANSFERRED RECORDS (OUTPATIENT)
Dept: HEALTH INFORMATION MANAGEMENT | Facility: CLINIC | Age: 68
End: 2023-04-06
Payer: COMMERCIAL

## 2023-04-10 DIAGNOSIS — E03.9 HYPOTHYROIDISM, UNSPECIFIED TYPE: ICD-10-CM

## 2023-04-10 DIAGNOSIS — E78.5 HYPERLIPIDEMIA LDL GOAL <100: ICD-10-CM

## 2023-04-10 RX ORDER — LEVOTHYROXINE SODIUM 150 UG/1
150 TABLET ORAL DAILY
Qty: 90 TABLET | Refills: 0 | Status: SHIPPED | OUTPATIENT
Start: 2023-04-10 | End: 2023-07-06

## 2023-04-10 RX ORDER — ATORVASTATIN CALCIUM 80 MG/1
80 TABLET, FILM COATED ORAL DAILY
Qty: 90 TABLET | Refills: 0 | Status: SHIPPED | OUTPATIENT
Start: 2023-04-10 | End: 2023-07-06

## 2023-05-04 ENCOUNTER — TRANSFERRED RECORDS (OUTPATIENT)
Dept: HEALTH INFORMATION MANAGEMENT | Facility: CLINIC | Age: 68
End: 2023-05-04
Payer: COMMERCIAL

## 2023-05-08 ENCOUNTER — OFFICE VISIT (OUTPATIENT)
Dept: OPHTHALMOLOGY | Facility: CLINIC | Age: 68
End: 2023-05-08
Attending: OPHTHALMOLOGY
Payer: COMMERCIAL

## 2023-05-08 DIAGNOSIS — H26.493 BILATERAL POSTERIOR CAPSULAR OPACIFICATION: ICD-10-CM

## 2023-05-08 DIAGNOSIS — Z96.1 PSEUDOPHAKIA: Primary | ICD-10-CM

## 2023-05-08 PROCEDURE — 66821 AFTER CATARACT LASER SURGERY: CPT | Mod: LT | Performed by: OPHTHALMOLOGY

## 2023-05-08 RX ORDER — PREDNISOLONE ACETATE 10 MG/ML
1 SUSPENSION/ DROPS OPHTHALMIC 4 TIMES DAILY
Qty: 5 ML | Refills: 0 | Status: SHIPPED | OUTPATIENT
Start: 2023-05-08 | End: 2023-05-13

## 2023-05-08 ASSESSMENT — TONOMETRY
IOP_METHOD: TONOPEN
OD_IOP_MMHG: 16
OS_IOP_MMHG: 18

## 2023-05-08 ASSESSMENT — SLIT LAMP EXAM - LIDS
COMMENTS: NORMAL
COMMENTS: NORMAL

## 2023-05-08 ASSESSMENT — EXTERNAL EXAM - LEFT EYE: OS_EXAM: NORMAL

## 2023-05-08 ASSESSMENT — EXTERNAL EXAM - RIGHT EYE: OD_EXAM: NORMAL

## 2023-05-08 NOTE — PROGRESS NOTES
Chief complaint   Blurry vision    S/p CE/IOL left eye 10/20/22  s/p CE/IOL right eye 10/6/22    Initial HPI    Nasreen Galarza 67 year old female here for hypermetropia and cataract follow up. Last eye visit at [facility] about little over a year ago. Vision okay for awhile, but now getting more difficult to see clearly at reading, especially computer. Does note some glare, halos around lights after dark. Wonders if needs cataract surgery. Uses art tears a few times a week for dryness          Interval History   S/p CE/IOL left eye (10/20/22) s/p CE/IOL right eye 10/6/22 and s/p CE/IOL left eye (10/20/22).   Both eyes blurry vision progressive worsening since few months    Past ocular history   Prior eye surgery/laser/Trauma:    - CE/IOL right eye 10/6/22   - CE/IOL left eye (10/20/22)  CTL wearer:No  Glasses : progressive  Family Hx of eye disease: No    PMH     Past Medical History:   Diagnosis Date     Depressive disorder 1996     Hypercholesterolemia      Migraines      STD (sexually transmitted disease) 1978    Herpes     Thyroid disease        PSH     Past Surgical History:   Procedure Laterality Date     ABDOMEN SURGERY  2008    Lap band     ARTHROPLASTY HIP  2011    L hip replacement     CHOLECYSTECTOMY  1996     COLONOSCOPY  2018     COLONOSCOPY  08/2022     COLONOSCOPY N/A 12/29/2022    Procedure: COLONOSCOPY;  Surgeon: Clement Boss MD;  Location:  GI     LUMBAR DISC SURGERY  1991    L4-L5 diskectomy '91     PHACOEMULSIFICATION WITH STANDARD INTRAOCULAR LENS IMPLANT Right 10/6/2022    Procedure: RIGHT PHACOEMULSIFICATION, CATARACT, WITH STANDARD INTRAOCULAR LENS IMPLANT INSERTION, kenalog;  Surgeon: Eliot Mcdonald MD;  Location: [facility] OR     PHACOEMULSIFICATION WITH STANDARD INTRAOCULAR LENS IMPLANT Left 10/20/2022    Procedure: LEFT PHACOEMULSIFICATION, CATARACT, WITH STANDARD INTRAOCULAR LENS IMPLANT INSERTION, kenalog;  Surgeon: Eliot Mcdonald MD;  Location: [facility] OR      TONSILLECTOMY  1961    Tonsillectomy       Meds     Current Outpatient Medications   Medication     atorvastatin (LIPITOR) 80 MG tablet     augmented betamethasone dipropionate (DIPROLENE-AF) 0.05 % external ointment     bisacodyl (DULCOLAX) 5 MG EC tablet     Fesoterodine Fumarate (TOVIAZ) 8 MG TB24     levothyroxine (SYNTHROID/LEVOTHROID) 150 MCG tablet     magnesium 500 MG TABS     Multiple Vitamins-Minerals (MULTI COMPLETE PO)     Omega-3 Fatty Acids (FISH OIL) 1200 MG capsule     PARoxetine (PAXIL) 20 MG tablet     polyethylene glycol (GOLYTELY) 236 g suspension     tretinoin (RETIN-A) 0.05 % external cream     valACYclovir (VALTREX) 500 MG tablet     Vitamin D, Cholecalciferol, 25 MCG (1000 UT) CAPS     No current facility-administered medications for this visit.     Drops Currently Taking   N/A    Assessment/Plan   # CE/IOL left eye 10/20/22  # CE/IOL right eye 10/6/22  - Doing well    #PCO, OU   - PCO OU  Might be contributing to the recent change in the vision  R/B/A discussed with the patient including risk of RD, Bleeding, High IOP, inflammation. Patient agrees to proceed  YAG left eye performed today without complications  Prednisolone sent to pharmacy for 1 week    Follow up: 1 month follow-up oct if vision does not correct to 20/20  Attending Physician Attestation:  Attending Physician Attestation: Complete documentation of historical and exam elements from today&#39;s encounter can be found in the full encounter summary report (not reduplicated in this progress note). I personally obtained the chief complaint(s) and history of present illness. I confirmed and edited as necessary the review of systems, past medical/surgical history, family history, social history, and examination findings as documented by others; and I examined the patient myself. I personally reviewed the relevant tests, images, and reports as documented above. I formulated and edited as necessary the assessment and plan and discussed  the findings and management plan with the patient and family. I was present for the entire procedure(s). Eliot Mcdonald M.D

## 2023-05-30 ENCOUNTER — TRANSFERRED RECORDS (OUTPATIENT)
Dept: HEALTH INFORMATION MANAGEMENT | Facility: CLINIC | Age: 68
End: 2023-05-30
Payer: COMMERCIAL

## 2023-06-02 ENCOUNTER — OFFICE VISIT (OUTPATIENT)
Dept: OPHTHALMOLOGY | Facility: CLINIC | Age: 68
End: 2023-06-02
Attending: OPHTHALMOLOGY
Payer: COMMERCIAL

## 2023-06-02 DIAGNOSIS — H43.813 PVD (POSTERIOR VITREOUS DETACHMENT), BILATERAL: ICD-10-CM

## 2023-06-02 DIAGNOSIS — H53.8 BLURRY VISION, LEFT EYE: Primary | ICD-10-CM

## 2023-06-02 DIAGNOSIS — H35.372 EPIRETINAL MEMBRANE (ERM) OF LEFT EYE: ICD-10-CM

## 2023-06-02 DIAGNOSIS — H26.493 BILATERAL POSTERIOR CAPSULAR OPACIFICATION: ICD-10-CM

## 2023-06-02 PROCEDURE — 99024 POSTOP FOLLOW-UP VISIT: CPT | Performed by: OPHTHALMOLOGY

## 2023-06-02 PROCEDURE — G0463 HOSPITAL OUTPT CLINIC VISIT: HCPCS | Mod: 25 | Performed by: OPHTHALMOLOGY

## 2023-06-02 PROCEDURE — 99207 YAG CAPSULOTOMY OS (LEFT EYE): CPT | Mod: LT | Performed by: OPHTHALMOLOGY

## 2023-06-02 PROCEDURE — 66821 AFTER CATARACT LASER SURGERY: CPT | Mod: LT | Performed by: OPHTHALMOLOGY

## 2023-06-02 PROCEDURE — 92134 CPTRZ OPH DX IMG PST SGM RTA: CPT | Performed by: OPHTHALMOLOGY

## 2023-06-02 ASSESSMENT — CONF VISUAL FIELD
OS_SUPERIOR_TEMPORAL_RESTRICTION: 0
OS_INFERIOR_TEMPORAL_RESTRICTION: 0
OD_INFERIOR_TEMPORAL_RESTRICTION: 0
OD_SUPERIOR_NASAL_RESTRICTION: 0
OD_SUPERIOR_TEMPORAL_RESTRICTION: 0
OS_SUPERIOR_NASAL_RESTRICTION: 0
OS_NORMAL: 1
OS_INFERIOR_NASAL_RESTRICTION: 0
OD_INFERIOR_NASAL_RESTRICTION: 0
OD_NORMAL: 1

## 2023-06-02 ASSESSMENT — EXTERNAL EXAM - LEFT EYE: OS_EXAM: NORMAL

## 2023-06-02 ASSESSMENT — VISUAL ACUITY
CORRECTION_TYPE: GLASSES
OS_CC: 20/25
OD_CC: 20/25
METHOD: SNELLEN - LINEAR

## 2023-06-02 ASSESSMENT — SLIT LAMP EXAM - LIDS
COMMENTS: NORMAL
COMMENTS: NORMAL

## 2023-06-02 ASSESSMENT — TONOMETRY
OD_IOP_MMHG: 09
OS_IOP_MMHG: 09
IOP_METHOD: ICARE

## 2023-06-02 ASSESSMENT — REFRACTION_WEARINGRX
SPECS_TYPE: PAL
OS_SPHERE: -0.50
OD_ADD: +2.50
OD_CYLINDER: +0.50
OD_AXIS: 162
OS_CYLINDER: +0.50
OS_AXIS: 018
OD_SPHERE: -1.00
OS_ADD: +2.50

## 2023-06-02 ASSESSMENT — EXTERNAL EXAM - RIGHT EYE: OD_EXAM: NORMAL

## 2023-06-02 ASSESSMENT — REFRACTION_MANIFEST
OS_ADD: +2.50
OS_AXIS: 175
OD_CYLINDER: +0.75
OD_AXIS: 180
OD_SPHERE: -0.75
OS_CYLINDER: +0.75
OD_ADD: +2.50
OS_SPHERE: -0.25

## 2023-06-02 NOTE — PROGRESS NOTES
Chief complaint   Blurry vision    S/p CE/IOL left eye 10/20/22  s/p CE/IOL right eye 10/6/22    Initial HPI    Nasreen Galarza 67 year old female here for follow-up after CEIOL each eye.         Interval History   S/p CE/IOL left eye (10/20/22) s/p CE/IOL right eye 10/6/22 and s/p CE/IOL left eye (10/20/22).    06/02/23: s/p yag cap, has new floaters in the left eye. Right eye is fine. Left eye feeling very dry. No flashes associated with floaters.     Past ocular history   Prior eye surgery/laser/Trauma:    - CE/IOL right eye 10/6/22   - CE/IOL left eye (10/20/22)  CTL wearer:No  Glasses : progressive  Family Hx of eye disease: No    PMH     Past Medical History:   Diagnosis Date     Depressive disorder 1996     Hypercholesterolemia      Migraines      STD (sexually transmitted disease) 1978    Herpes     Thyroid disease        PSH     Past Surgical History:   Procedure Laterality Date     ABDOMEN SURGERY  2008    Lap band     ARTHROPLASTY HIP  2011    L hip replacement     CHOLECYSTECTOMY  1996     COLONOSCOPY  2018     COLONOSCOPY  08/2022     COLONOSCOPY N/A 12/29/2022    Procedure: COLONOSCOPY;  Surgeon: Clement Boss MD;  Location:  GI     LUMBAR DISC SURGERY  1991    L4-L5 diskectomy '91     PHACOEMULSIFICATION WITH STANDARD INTRAOCULAR LENS IMPLANT Right 10/6/2022    Procedure: RIGHT PHACOEMULSIFICATION, CATARACT, WITH STANDARD INTRAOCULAR LENS IMPLANT INSERTION, kenalog;  Surgeon: Eliot Mcdonald MD;  Location: [facility] OR     PHACOEMULSIFICATION WITH STANDARD INTRAOCULAR LENS IMPLANT Left 10/20/2022    Procedure: LEFT PHACOEMULSIFICATION, CATARACT, WITH STANDARD INTRAOCULAR LENS IMPLANT INSERTION, kenalog;  Surgeon: Eliot Mcdonald MD;  Location: [facility] OR     TONSILLECTOMY  1961    Tonsillectomy       Meds     Current Outpatient Medications   Medication     atorvastatin (LIPITOR) 80 MG tablet     augmented betamethasone dipropionate (DIPROLENE-AF) 0.05 % external ointment     Fesoterodine Fumarate  (TOVIAZ) 8 MG TB24     levothyroxine (SYNTHROID/LEVOTHROID) 150 MCG tablet     magnesium 500 MG TABS     Multiple Vitamins-Minerals (MULTI COMPLETE PO)     Omega-3 Fatty Acids (FISH OIL) 1200 MG capsule     PARoxetine (PAXIL) 20 MG tablet     tretinoin (RETIN-A) 0.05 % external cream     valACYclovir (VALTREX) 500 MG tablet     Vitamin D, Cholecalciferol, 25 MCG (1000 UT) CAPS     No current facility-administered medications for this visit.     Drops Currently Taking   N/A    Assessment/Plan   #blurry vision left eye  #PCO, right eye>OS    ERM noted on OCT  Patient feels that there is a cloud in front of her  Told her it can be also from the remaining PCO /vitreous strand in the left eye  R/B/A discussed with the patient including risk of RD, Bleeding, High IOP, inflammation. Patient agrees to proceed with YAG and removal of the remaining strand  Yag performed left eye today without complications  New prescription given to the patient    # SHEILA each eye   - AT QID PRN    # PVD OU  - dilated 06/02/23,  no RT/RD on peripheral exam      # ERM left eye  - mild metamorphopsia with amsler grid  - describes right portion of grid to be distorted  - not significantly bothered by this  - will monitor     # CE/IOL left eye 10/20/22 s/p yag cap 5/8/23  # CE/IOL right eye 10/6/22       Follow up: 3-6 months for next visit, sooner if vision gets blurrier.    Felice Florian MD  Resident Physician, PGY-3  Department of Ophthalmology    Attending Physician Attestation:  Attending Physician Attestation: Complete documentation of historical and exam elements from today&#39;s encounter can be found in the full encounter summary report (not reduplicated in this progress note). I personally obtained the chief complaint(s) and history of present illness. I confirmed and edited as necessary the review of systems, past medical/surgical history, family history, social history, and examination findings as documented by others; and I examined  the patient myself. I personally reviewed the relevant tests, images, and reports as documented above. I formulated and edited as necessary the assessment and plan and discussed the findings and management plan with the patient and family. I was present for the entire procedure(s). Eliot Mcdonald M.D

## 2023-06-02 NOTE — NURSING NOTE
"Chief Complaints and History of Present Illnesses   Patient presents with     Pseudophakia Follow Up     1 month follow up     Chief Complaint(s) and History of Present Illness(es)     Pseudophakia Follow Up            Comments: 1 month follow up          Comments    Pt states vision is \"awful\". Vision appearing more blurry and feeling dry in LE. Pt seeing more floaters in LE since having YAG LE.   No eye pain today. No new redness.     JERED Wilburn June 2, 2023 7:29 AM                       "

## 2023-06-04 ENCOUNTER — TRANSFERRED RECORDS (OUTPATIENT)
Dept: HEALTH INFORMATION MANAGEMENT | Facility: CLINIC | Age: 68
End: 2023-06-04
Payer: COMMERCIAL

## 2023-06-12 ENCOUNTER — TRANSFERRED RECORDS (OUTPATIENT)
Dept: HEALTH INFORMATION MANAGEMENT | Facility: CLINIC | Age: 68
End: 2023-06-12
Payer: COMMERCIAL

## 2023-06-24 ENCOUNTER — E-VISIT (OUTPATIENT)
Dept: FAMILY MEDICINE | Facility: CLINIC | Age: 68
End: 2023-06-24
Payer: COMMERCIAL

## 2023-06-24 DIAGNOSIS — K12.0 CANKER SORES ORAL: Primary | ICD-10-CM

## 2023-06-24 PROCEDURE — 99421 OL DIG E/M SVC 5-10 MIN: CPT | Performed by: FAMILY MEDICINE

## 2023-06-26 RX ORDER — CLOBETASOL PROPIONATE 0.05 MG/G
GEL TOPICAL 2 TIMES DAILY
Qty: 60 G | Refills: 0 | Status: SHIPPED | OUTPATIENT
Start: 2023-06-26 | End: 2023-09-20

## 2023-07-05 DIAGNOSIS — E78.5 HYPERLIPIDEMIA LDL GOAL <100: ICD-10-CM

## 2023-07-06 RX ORDER — ATORVASTATIN CALCIUM 80 MG/1
80 TABLET, FILM COATED ORAL DAILY
Qty: 90 TABLET | Refills: 0 | Status: SHIPPED | OUTPATIENT
Start: 2023-07-06 | End: 2023-09-29

## 2023-07-06 NOTE — TELEPHONE ENCOUNTER
"Requested Prescriptions   Pending Prescriptions Disp Refills     atorvastatin (LIPITOR) 80 MG tablet 90 tablet 0     Sig: Take 1 tablet (80 mg) by mouth daily       Statins Protocol Passed - 7/5/2023  5:17 PM        Passed - LDL on file in past 12 months     Recent Labs   Lab Test 12/14/22  0937   *             Passed - No abnormal creatine kinase in past 12 months     No lab results found.             Passed - Recent (12 mo) or future (30 days) visit within the authorizing provider's specialty     Patient has had an office visit with the authorizing provider or a provider within the authorizing providers department within the previous 12 mos or has a future within next 30 days. See \"Patient Info\" tab in inbasket, or \"Choose Columns\" in Meds & Orders section of the refill encounter.              Passed - Medication is active on med list        Passed - Patient is age 18 or older        Passed - No active pregnancy on record        Passed - No positive pregnancy test in past 12 months           Prescription approved per [facility] Refill Protocol.      Maribell Watters RN    "

## 2023-07-10 DIAGNOSIS — R39.15 URINARY URGENCY: ICD-10-CM

## 2023-07-10 RX ORDER — FESOTERODINE FUMARATE 8 MG/1
8 TABLET, FILM COATED, EXTENDED RELEASE ORAL DAILY
Qty: 90 TABLET | Refills: 3 | Status: SHIPPED | OUTPATIENT
Start: 2023-07-10 | End: 2024-07-18

## 2023-08-02 ENCOUNTER — OFFICE VISIT (OUTPATIENT)
Dept: FAMILY MEDICINE | Facility: CLINIC | Age: 68
End: 2023-08-02
Payer: COMMERCIAL

## 2023-08-02 VITALS
DIASTOLIC BLOOD PRESSURE: 64 MMHG | HEART RATE: 62 BPM | HEIGHT: 65 IN | TEMPERATURE: 96.9 F | RESPIRATION RATE: 18 BRPM | BODY MASS INDEX: 33.52 KG/M2 | WEIGHT: 201.2 LBS | SYSTOLIC BLOOD PRESSURE: 98 MMHG | OXYGEN SATURATION: 95 %

## 2023-08-02 DIAGNOSIS — H81.10 BENIGN PAROXYSMAL POSITIONAL VERTIGO, UNSPECIFIED LATERALITY: ICD-10-CM

## 2023-08-02 DIAGNOSIS — F43.23 ADJUSTMENT DISORDER WITH MIXED ANXIETY AND DEPRESSED MOOD: Primary | ICD-10-CM

## 2023-08-02 LAB
ALBUMIN SERPL BCG-MCNC: 4.1 G/DL (ref 3.5–5.2)
ALP SERPL-CCNC: 112 U/L (ref 35–104)
ALT SERPL W P-5'-P-CCNC: 15 U/L (ref 0–50)
ANION GAP SERPL CALCULATED.3IONS-SCNC: 11 MMOL/L (ref 7–15)
AST SERPL W P-5'-P-CCNC: 28 U/L (ref 0–45)
BILIRUB SERPL-MCNC: 0.4 MG/DL
BUN SERPL-MCNC: 17.9 MG/DL (ref 8–23)
CALCIUM SERPL-MCNC: 9.8 MG/DL (ref 8.8–10.2)
CHLORIDE SERPL-SCNC: 105 MMOL/L (ref 98–107)
CHOLEST SERPL-MCNC: 153 MG/DL
CREAT SERPL-MCNC: 0.82 MG/DL (ref 0.51–0.95)
DEPRECATED HCO3 PLAS-SCNC: 25 MMOL/L (ref 22–29)
ERYTHROCYTE [DISTWIDTH] IN BLOOD BY AUTOMATED COUNT: 13.4 % (ref 10–15)
GFR SERPL CREATININE-BSD FRML MDRD: 77 ML/MIN/1.73M2
GGT SERPL-CCNC: 20 U/L (ref 5–36)
GLUCOSE SERPL-MCNC: 111 MG/DL (ref 70–99)
HBA1C MFR BLD: 5.5 % (ref 0–5.6)
HCT VFR BLD AUTO: 38.2 % (ref 35–47)
HDLC SERPL-MCNC: 49 MG/DL
HGB BLD-MCNC: 12.9 G/DL (ref 11.7–15.7)
LDLC SERPL CALC-MCNC: 89 MG/DL
MCH RBC QN AUTO: 29.5 PG (ref 26.5–33)
MCHC RBC AUTO-ENTMCNC: 33.8 G/DL (ref 31.5–36.5)
MCV RBC AUTO: 87 FL (ref 78–100)
NONHDLC SERPL-MCNC: 104 MG/DL
PLATELET # BLD AUTO: 276 10E3/UL (ref 150–450)
POTASSIUM SERPL-SCNC: 4.3 MMOL/L (ref 3.4–5.3)
PROT SERPL-MCNC: 7.1 G/DL (ref 6.4–8.3)
RBC # BLD AUTO: 4.38 10E6/UL (ref 3.8–5.2)
SODIUM SERPL-SCNC: 141 MMOL/L (ref 136–145)
TRIGL SERPL-MCNC: 75 MG/DL
TSH SERPL DL<=0.005 MIU/L-ACNC: 0.89 UIU/ML (ref 0.3–4.2)
WBC # BLD AUTO: 6.8 10E3/UL (ref 4–11)

## 2023-08-02 PROCEDURE — 85027 COMPLETE CBC AUTOMATED: CPT | Performed by: FAMILY MEDICINE

## 2023-08-02 PROCEDURE — 80061 LIPID PANEL: CPT | Performed by: FAMILY MEDICINE

## 2023-08-02 PROCEDURE — 83036 HEMOGLOBIN GLYCOSYLATED A1C: CPT | Performed by: FAMILY MEDICINE

## 2023-08-02 PROCEDURE — 36415 COLL VENOUS BLD VENIPUNCTURE: CPT | Performed by: FAMILY MEDICINE

## 2023-08-02 PROCEDURE — 84443 ASSAY THYROID STIM HORMONE: CPT | Performed by: FAMILY MEDICINE

## 2023-08-02 PROCEDURE — 80053 COMPREHEN METABOLIC PANEL: CPT | Performed by: FAMILY MEDICINE

## 2023-08-02 PROCEDURE — 82977 ASSAY OF GGT: CPT | Performed by: FAMILY MEDICINE

## 2023-08-02 PROCEDURE — 99213 OFFICE O/P EST LOW 20 MIN: CPT | Performed by: FAMILY MEDICINE

## 2023-08-02 ASSESSMENT — ENCOUNTER SYMPTOMS
HEMATURIA: 0
CONSTIPATION: 0
MYALGIAS: 1
HEARTBURN: 0
FEVER: 0
PARESTHESIAS: 0
DIZZINESS: 0
EYE PAIN: 0
PALPITATIONS: 0
BREAST MASS: 0
ARTHRALGIAS: 1
NERVOUS/ANXIOUS: 1
COUGH: 0
JOINT SWELLING: 0
DYSURIA: 0
HEMATOCHEZIA: 0
ABDOMINAL PAIN: 0
SORE THROAT: 0
FREQUENCY: 0
CHILLS: 0
HEADACHES: 1
NAUSEA: 0
SHORTNESS OF BREATH: 0
DIARRHEA: 0
WEAKNESS: 0

## 2023-08-02 ASSESSMENT — ACTIVITIES OF DAILY LIVING (ADL): CURRENT_FUNCTION: NO ASSISTANCE NEEDED

## 2023-08-02 ASSESSMENT — PAIN SCALES - GENERAL: PAINLEVEL: NO PAIN (0)

## 2023-08-02 NOTE — PROGRESS NOTES
Assessment & Plan     Adjustment disorder with mixed anxiety and depressed mood  - Adult Mental Health  Referral; Future    Benign paroxysmal positional vertigo, unspecified laterality  - Physical Therapy Referral; Future      Noemi Mayer MD  [facility] is a 68 year old, presenting for the following health issues:  Weight Problem    Other  This is a chronic problem. The current episode started more than 1 year ago. Associated symptoms include arthralgias, headaches and myalgias. Pertinent negatives include no abdominal pain, chest pain, chills, congestion, coughing, fever, joint swelling, nausea, rash, sore throat or weakness.            10/17/2021     5:49 PM 11/19/2021     8:34 AM 12/7/2022     2:29 PM   PHQ   PHQ-9 Total Score 12 14 3   Q9: Thoughts of better off dead/self-harm past 2 weeks Not at all Not at all Not at all         1/11/2019     2:41 PM 8/23/2021     9:12 AM 12/7/2022     2:29 PM   LYNETTE-7 SCORE   Total Score 19 (severe anxiety) 1 (minimal anxiety) 0 (minimal anxiety)   Total Score 19 1 0     16 lbs weight gain since December 2023. Patient stopped tracking her calories. Patient was doing palates until to she had a shoulder contusion. Shoulder was evaluated by [facility] and was informed that the shoulder was fine.  She has intermittent episodes of dizziness.  Denies any chest pain shortness of breath or palpitations.      Wt Readings from Last 4 Encounters:   08/02/23 91.3 kg (201 lb 3.2 oz)   12/29/22 83.5 kg (184 lb)   12/14/22 86.6 kg (190 lb 14.4 oz)   10/20/22 83 kg (183 lb)     BP Readings from Last 6 Encounters:   08/02/23 98/64   12/29/22 (!) 131/95   12/14/22 125/68   10/20/22 (!) 143/78   10/06/22 122/67   10/03/22 125/67         Review of Systems   Constitutional:  Negative for chills and fever.   HENT:  Negative for congestion, ear pain, hearing loss and sore throat.    Eyes:  Negative for pain and visual disturbance.  "  Respiratory:  Negative for cough and shortness of breath.    Cardiovascular:  Negative for chest pain, palpitations and peripheral edema.   Gastrointestinal:  Negative for abdominal pain, constipation, diarrhea, heartburn, hematochezia and nausea.   Breasts:  Negative for tenderness, breast mass and discharge.   Genitourinary:  Negative for dysuria, frequency, genital sores, hematuria, pelvic pain, urgency, vaginal bleeding and vaginal discharge.   Musculoskeletal:  Positive for arthralgias and myalgias. Negative for joint swelling.   Skin:  Negative for rash.   Neurological:  Positive for headaches. Negative for dizziness, weakness and paresthesias.   Psychiatric/Behavioral:  Negative for mood changes. The patient is nervous/anxious.           Objective    BP 98/64   Pulse 62   Temp 96.9  F (36.1  C) (Temporal)   Resp 18   Ht 1.638 m (5' 4.5\")   Wt 91.3 kg (201 lb 3.2 oz)   SpO2 95%   BMI 34.00 kg/m    Body mass index is 34 kg/m .  Physical Exam   GENERAL: healthy, alert and no distress  NECK: no adenopathy, no asymmetry, masses, or scars and thyroid normal to palpation  RESP: lungs clear to auscultation - no rales, rhonchi or wheezes  CV: regular rate and rhythm, normal S1 S2, no S3 or S4, no murmur, click or rub, no peripheral edema and peripheral pulses strong  MS: no gross musculoskeletal defects noted, no edema                  "

## 2023-08-14 ENCOUNTER — MYC MEDICAL ADVICE (OUTPATIENT)
Dept: FAMILY MEDICINE | Facility: CLINIC | Age: 68
End: 2023-08-14
Payer: COMMERCIAL

## 2023-09-13 ENCOUNTER — MYC MEDICAL ADVICE (OUTPATIENT)
Dept: FAMILY MEDICINE | Facility: CLINIC | Age: 68
End: 2023-09-13
Payer: COMMERCIAL

## 2023-09-20 ENCOUNTER — OFFICE VISIT (OUTPATIENT)
Dept: OPHTHALMOLOGY | Facility: CLINIC | Age: 68
End: 2023-09-20
Attending: OPHTHALMOLOGY
Payer: COMMERCIAL

## 2023-09-20 DIAGNOSIS — Z96.1 PSEUDOPHAKIA: Primary | ICD-10-CM

## 2023-09-20 DIAGNOSIS — H04.123 DRY EYE SYNDROME OF BOTH EYES: ICD-10-CM

## 2023-09-20 DIAGNOSIS — H35.372 EPIRETINAL MEMBRANE (ERM) OF LEFT EYE: ICD-10-CM

## 2023-09-20 PROCEDURE — G0463 HOSPITAL OUTPT CLINIC VISIT: HCPCS | Performed by: OPHTHALMOLOGY

## 2023-09-20 PROCEDURE — 99024 POSTOP FOLLOW-UP VISIT: CPT | Mod: GC | Performed by: OPHTHALMOLOGY

## 2023-09-20 ASSESSMENT — CONF VISUAL FIELD
OD_INFERIOR_NASAL_RESTRICTION: 0
OD_SUPERIOR_NASAL_RESTRICTION: 0
OS_SUPERIOR_NASAL_RESTRICTION: 0
OD_SUPERIOR_TEMPORAL_RESTRICTION: 0
OS_INFERIOR_TEMPORAL_RESTRICTION: 0
OD_NORMAL: 1
METHOD: COUNTING FINGERS
OD_INFERIOR_TEMPORAL_RESTRICTION: 0
OS_SUPERIOR_TEMPORAL_RESTRICTION: 0
OS_INFERIOR_NASAL_RESTRICTION: 0
OS_NORMAL: 1

## 2023-09-20 ASSESSMENT — REFRACTION_WEARINGRX
OS_CYLINDER: +0.50
OS_SPHERE: -0.50
OS_ADD: +2.50
OD_CYLINDER: +0.50
SPECS_TYPE: PAL
OD_SPHERE: -1.00
OS_AXIS: 018
OD_ADD: +2.50
OD_AXIS: 162

## 2023-09-20 ASSESSMENT — VISUAL ACUITY
OS_CC+: -3
OD_CC: J1+
OS_CC: 20/25
OD_CC: 20/20
METHOD_MR: DIAGNOSTIC
METHOD: SNELLEN - LINEAR
OS_CC: J1+

## 2023-09-20 ASSESSMENT — TONOMETRY
IOP_METHOD: ICARE
OS_IOP_MMHG: 10
OD_IOP_MMHG: 9

## 2023-09-20 ASSESSMENT — REFRACTION_MANIFEST
OS_CYLINDER: +0.75
OS_SPHERE: -0.75
OS_ADD: +2.50
OS_AXIS: 014

## 2023-09-20 ASSESSMENT — SLIT LAMP EXAM - LIDS
COMMENTS: NORMAL
COMMENTS: NORMAL

## 2023-09-20 ASSESSMENT — EXTERNAL EXAM - LEFT EYE: OS_EXAM: NORMAL

## 2023-09-20 ASSESSMENT — EXTERNAL EXAM - RIGHT EYE: OD_EXAM: NORMAL

## 2023-09-20 NOTE — NURSING NOTE
Chief Complaints and History of Present Illnesses   Patient presents with    Pseudophakia Follow Up     Chief Complaint(s) and History of Present Illness(es)       Pseudophakia Follow Up              Laterality: both eyes    Associated symptoms: Negative for blurred vision    Pain scale: 0/10              Comments    Nasreen is here to follow up in pseudophakia both eyes. Today she states a few days ago she saw a large floater RE but it is gone. Feels vision is good both eyes.     Jose Barkley COT 12:28 PM September 20, 2023

## 2023-09-20 NOTE — PROGRESS NOTES
Chief complaint   Follow up    S/p CE/IOL left eye 10/20/22  s/p CE/IOL right eye 10/6/22    Initial HPI    Nasreen Galarza 68 year old female here for follow-up. S/p CE/IOL left eye (10/20/22) s/p CE/IOL right eye 10/6/2      Interval history 09/20/23: Doing well with no concerns. Thought she was having YAG cap right eye today but has already been done 05/2023. Otherwise no concerns.      Past ocular history   Prior eye surgery/laser/Trauma:    - CE/IOL right eye 10/6/22   - CE/IOL left eye (10/20/22)   - YAG cap right eye 5/8/23   - YAG cap left eye 6/2/23  CTL wearer:No  Glasses : progressive  Family Hx of eye disease: No    PMH     Past Medical History:   Diagnosis Date    Depressive disorder 1996    Hypercholesterolemia     Migraines     STD (sexually transmitted disease) 1978    Herpes    Thyroid disease        PSH     Past Surgical History:   Procedure Laterality Date    ABDOMEN SURGERY  2008    Lap band    ARTHROPLASTY HIP  2011    L hip replacement    BACK SURGERY  08/1991    CATARACT IOL, RT/LT      CHOLECYSTECTOMY  1996    COLONOSCOPY  2018    COLONOSCOPY  08/2022    COLONOSCOPY N/A 12/29/2022    Procedure: COLONOSCOPY;  Surgeon: Clement Boss MD;  Location:  GI    LUMBAR DISC SURGERY  1991    L4-L5 diskectomy '91    PHACOEMULSIFICATION WITH STANDARD INTRAOCULAR LENS IMPLANT Right 10/06/2022    Procedure: RIGHT PHACOEMULSIFICATION, CATARACT, WITH STANDARD INTRAOCULAR LENS IMPLANT INSERTION, kenalog;  Surgeon: Eliot Mcdonald MD;  [place] [facility] [place]    PHACOEMULSIFICATION WITH STANDARD INTRAOCULAR LENS IMPLANT Left 10/20/2022    Procedure: LEFT PHACOEMULSIFICATION, CATARACT, WITH STANDARD INTRAOCULAR LENS IMPLANT INSERTION, kenalog;  Surgeon: Eliot Mcdonald MD;  [place] [facility] [place]    TONSILLECTOMY  1961    Tonsillectomy       Meds     Current Outpatient Medications   Medication    atorvastatin (LIPITOR) 80 MG tablet    augmented betamethasone dipropionate (DIPROLENE-AF) 0.05 % external ointment     Fesoterodine Fumarate (TOVIAZ) 8 MG TB24    levothyroxine (SYNTHROID/LEVOTHROID) 150 MCG tablet    magnesium 500 MG TABS    Multiple Vitamins-Minerals (MULTI COMPLETE PO)    Omega-3 Fatty Acids (FISH OIL) 1200 MG capsule    PARoxetine (PAXIL) 20 MG tablet    tretinoin (RETIN-A) 0.05 % external cream    valACYclovir (VALTREX) 500 MG tablet    Vitamin D, Cholecalciferol, 25 MCG (1000 UT) CAPS     No current facility-administered medications for this visit.     Drops Currently Taking   N/A    Assessment/Plan     # Blurry vision left eye  # ERM left eye  - mild metamorphopsia with amsler grid  - describes right portion of grid to be distorted  - not significantly bothered by this  - will monitor     # SHEILA each eye   - AT QID PRN    # PVD OU  - dilated 06/02/23,  no RT/RD on peripheral exam    # CE/IOL left eye 10/20/22 s/p yag cap 5/8/23  # CE/IOL right eye 10/6/22 s/p yag cap 6/2/23    Follow up: 1 year VTD MRX    Rosalia Hoyos MD  Resident Physician, PGY-3  Department of Ophthalmology    Attending Physician Attestation:  Complete documentation of historical and exam elements from today's encounter can be found in the full encounter summary report (not reduplicated in this progress note).  I personally obtained the chief complaint(s) and history of present illness.  I confirmed and edited as necessary the review of systems, past medical/surgical history, family history, social history, and examination findings as documented by others; and I examined the patient myself.  I personally reviewed the relevant tests, images, and reports as documented above.  I formulated and edited as necessary the assessment and plan and discussed the findings and management plan with the patient and family. - Eliot Mcdonald MD

## 2023-09-22 ENCOUNTER — TRANSFERRED RECORDS (OUTPATIENT)
Dept: HEALTH INFORMATION MANAGEMENT | Facility: CLINIC | Age: 68
End: 2023-09-22
Payer: COMMERCIAL

## 2023-09-22 ENCOUNTER — MYC MEDICAL ADVICE (OUTPATIENT)
Dept: FAMILY MEDICINE | Facility: CLINIC | Age: 68
End: 2023-09-22
Payer: COMMERCIAL

## 2023-09-28 ENCOUNTER — HOSPITAL ENCOUNTER (OUTPATIENT)
Dept: MAMMOGRAPHY | Facility: CLINIC | Age: 68
Discharge: HOME | End: 2023-09-28
Attending: FAMILY MEDICINE | Admitting: FAMILY MEDICINE
Payer: COMMERCIAL

## 2023-09-28 DIAGNOSIS — Z12.31 VISIT FOR SCREENING MAMMOGRAM: ICD-10-CM

## 2023-09-28 PROCEDURE — 77067 SCR MAMMO BI INCL CAD: CPT

## 2023-09-29 ENCOUNTER — TRANSFERRED RECORDS (OUTPATIENT)
Dept: HEALTH INFORMATION MANAGEMENT | Facility: CLINIC | Age: 68
End: 2023-09-29
Payer: COMMERCIAL

## 2023-09-29 DIAGNOSIS — E03.9 HYPOTHYROIDISM, UNSPECIFIED TYPE: ICD-10-CM

## 2023-09-29 DIAGNOSIS — E78.5 HYPERLIPIDEMIA LDL GOAL <100: ICD-10-CM

## 2023-09-29 DIAGNOSIS — N95.1 MENOPAUSAL SYNDROME (HOT FLASHES): ICD-10-CM

## 2023-09-29 RX ORDER — LEVOTHYROXINE SODIUM 150 UG/1
150 TABLET ORAL DAILY
Qty: 90 TABLET | Refills: 0 | Status: SHIPPED | OUTPATIENT
Start: 2023-09-29 | End: 2023-11-29

## 2023-09-29 RX ORDER — PAROXETINE 20 MG/1
20 TABLET, FILM COATED ORAL EVERY MORNING
Qty: 90 TABLET | Refills: 0 | Status: SHIPPED | OUTPATIENT
Start: 2023-09-29 | End: 2023-11-29

## 2023-09-29 RX ORDER — ATORVASTATIN CALCIUM 80 MG/1
80 TABLET, FILM COATED ORAL DAILY
Qty: 90 TABLET | Refills: 0 | Status: SHIPPED | OUTPATIENT
Start: 2023-09-29 | End: 2023-11-29

## 2023-10-03 ENCOUNTER — TRANSFERRED RECORDS (OUTPATIENT)
Dept: HEALTH INFORMATION MANAGEMENT | Facility: CLINIC | Age: 68
End: 2023-10-03
Payer: COMMERCIAL

## 2023-11-27 ENCOUNTER — TRANSFERRED RECORDS (OUTPATIENT)
Dept: HEALTH INFORMATION MANAGEMENT | Facility: CLINIC | Age: 68
End: 2023-11-27
Payer: COMMERCIAL

## 2023-11-29 ENCOUNTER — PATIENT OUTREACH (OUTPATIENT)
Dept: GASTROENTEROLOGY | Facility: CLINIC | Age: 68
End: 2023-11-29
Payer: COMMERCIAL

## 2023-12-04 ENCOUNTER — FCC EXTENDED DOCUMENTATION (OUTPATIENT)
Dept: PSYCHOLOGY | Facility: CLINIC | Age: 68
End: 2023-12-04

## 2023-12-04 ENCOUNTER — VIRTUAL VISIT (OUTPATIENT)
Dept: PSYCHOLOGY | Facility: CLINIC | Age: 68
End: 2023-12-04
Payer: COMMERCIAL

## 2023-12-04 DIAGNOSIS — F43.22 ADJUSTMENT DISORDER WITH ANXIOUS MOOD: Primary | ICD-10-CM

## 2023-12-04 PROCEDURE — 90837 PSYTX W PT 60 MINUTES: CPT | Mod: VID | Performed by: SOCIAL WORKER

## 2023-12-04 ASSESSMENT — ANXIETY QUESTIONNAIRES
IF YOU CHECKED OFF ANY PROBLEMS ON THIS QUESTIONNAIRE, HOW DIFFICULT HAVE THESE PROBLEMS MADE IT FOR YOU TO DO YOUR WORK, TAKE CARE OF THINGS AT HOME, OR GET ALONG WITH OTHER PEOPLE: NOT DIFFICULT AT ALL
1. FEELING NERVOUS, ANXIOUS, OR ON EDGE: NOT AT ALL
7. FEELING AFRAID AS IF SOMETHING AWFUL MIGHT HAPPEN: NOT AT ALL
5. BEING SO RESTLESS THAT IT IS HARD TO SIT STILL: NOT AT ALL
2. NOT BEING ABLE TO STOP OR CONTROL WORRYING: NOT AT ALL
GAD7 TOTAL SCORE: 1
GAD7 TOTAL SCORE: 1
6. BECOMING EASILY ANNOYED OR IRRITABLE: SEVERAL DAYS
3. WORRYING TOO MUCH ABOUT DIFFERENT THINGS: NOT AT ALL

## 2023-12-04 ASSESSMENT — COLUMBIA-SUICIDE SEVERITY RATING SCALE - C-SSRS
TOTAL  NUMBER OF ABORTED OR SELF INTERRUPTED ATTEMPTS SINCE LAST CONTACT: NO
1. SINCE LAST CONTACT, HAVE YOU WISHED YOU WERE DEAD OR WISHED YOU COULD GO TO SLEEP AND NOT WAKE UP?: NO
SUICIDE, SINCE LAST CONTACT: NO
ATTEMPT SINCE LAST CONTACT: NO
6. HAVE YOU EVER DONE ANYTHING, STARTED TO DO ANYTHING, OR PREPARED TO DO ANYTHING TO END YOUR LIFE?: NO
TOTAL  NUMBER OF INTERRUPTED ATTEMPTS SINCE LAST CONTACT: NO
2. HAVE YOU ACTUALLY HAD ANY THOUGHTS OF KILLING YOURSELF?: NO

## 2023-12-04 ASSESSMENT — PATIENT HEALTH QUESTIONNAIRE - PHQ9
10. IF YOU CHECKED OFF ANY PROBLEMS, HOW DIFFICULT HAVE THESE PROBLEMS MADE IT FOR YOU TO DO YOUR WORK, TAKE CARE OF THINGS AT HOME, OR GET ALONG WITH OTHER PEOPLE: SOMEWHAT DIFFICULT
SUM OF ALL RESPONSES TO PHQ QUESTIONS 1-9: 9
SUM OF ALL RESPONSES TO PHQ QUESTIONS 1-9: 9
5. POOR APPETITE OR OVEREATING: NOT AT ALL

## 2023-12-04 NOTE — PROGRESS NOTES
[facility]   Mental Health & Addiction Services     Progress Note - Initial Visit    Patient  Name:  Nasreen Galarza Date: 2023         Service Type: Individual     Visit Start Time: 16:02  Visit End Time: 16:58    Visit #: 1    Attendees: Client attended alone    Service Modality:  Video Visit:      Provider verified identity through the following two step process.  Patient provided:  Patient photo, Patient , and Patient address    Telemedicine Visit: The patient's condition can be safely assessed and treated via synchronous audio and visual telemedicine encounter.      Reason for Telemedicine Visit: Services only offered telehealth    Originating Site (Patient Location): Patient's other office    Distant Site (Provider Location): Provider Remote Setting- Home Office    Consent:  The patient/guardian has verbally consented to: the potential risks and benefits of telemedicine (video visit) versus in person care; bill my insurance or make self-payment for services provided; and responsibility for payment of non-covered services.     Patient would like the video invitation sent by:  My Chart    Mode of Communication:  Video Conference via Amwell    Distant Location (Provider):  Off-site    As the provider I attest to compliance with applicable laws and regulations related to telemedicine.       DATA:   Interactive Complexity: Yes, visit entailed Interactive Complexity evidenced by: 1 st encounter, AIDET was completed, Diagnostic assessment was initiated and future visits were scheduled.      Crisis: No     Presenting Concerns/  Current Stressors: Patient is a 68 year old  female who presented virtually with a concern of self sabotaging when it comes to her employment. Shared she feels she has had enough with starting her job with interest and motivation and then being fired due to not being productive. Shared the longest time she ever held a job was 7 years.  Adopted as a baby  CHIEF COMPLAINT: Possible TIA/CAD-CABG/PAF/ICD    HISTORY OF PRESENT ILLNESS: Patient is a 80 y.o. male seen at the request of James Jo DO. No CP or SOB. PMH:   1. MI, 2003. Cardiac catheterization: 85% ostial, proximal and mid LAD narrowings. LMC 70% stenosis. D1 occluded. D2 50% stenosis. OM1 80% ostial stenosis. Mid CX occluded. Dominant RCA severe disease. LVEF 40-45%. 2. CAB[place], 05[place]23/[place] [facility] with LIMA-LAD, SVG-RI, SVG-OM. 3. Hyperlipidemia. 4. Mild carotid plaque on US, 2003. 5. Echo, 07/2006. Mild LVE, normal EF, Stage II diastolic dysfunction, moderate AS, mild MR, mild TR. 6. Right leg vein stripping, 1970's. 7. No history diabetes mellitus, stroke or COPD. 8. Nonsmoker for many years. 9. VT causing cardiac arrest after stress test, 03/15/2007. He was successfully cardioverted. 10. Cardiac catheterization, 03/16/2007 with normal LVEF, severe native three vessel coronary disease. SVG-RI, SVG-OM both occluded. LIMA-LAD patent. 11. Re-do CABG, [facility], 03/2007 with radial artery graft to D2 and OM2. 12. Elective PCI with CONNOR native OM2 and native LAD, 03/2007 following CABG. This was done because of inadequate conduits. 13. ICD placement, [facility], 03/2007. 14. ICD lead recall, early 2008, but he was followed electively because his device was functioning normally. 15. Presentation [facility], 03/29/2008 with multiple ICD inappropriate shocks. Transfer [facility] where ICD and lead were replaced. He reports cardiac catheterization that revealed patent LIMA-LAD and patent stents in native coronary arteries. 16. Atypical chest pain and anxiety with admission [facility], 05/2008. Troponin minimally elevated. Beta blocker dose increased. [address] [facility] admission, 06/13/2009 with lightheadedness, orthostatic hypotension, drop in hemoglobin to 10.5 from 14.9 over two months with an increase in BUN from 16 to 68 over the same time. Dark heme positive stools noted. which she feels needs to be revisited. Remarried twice and has one son(40) from her 1st marriage and  lives in [place]. Visited her on Thanksgiving. Patient goal is to keep her current employment. Sh just started her new job on 10/23 and has been developing anxiety about losing it. Had a good job with the [employer] from a couple of years. Will complete her diagnostic assessment in 2 weeks. Will not eat, sleep to cope. 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     ASSESSMENT:  Mental Status Assessment:  Appearance:   Appropriate   Eye Contact:   Good   Psychomotor Behavior: Normal   Attitude:   Cooperative   Orientation:   Person Place Time Situation  Speech   Rate / Production: Normal/ Responsive   Volume:  Normal   Mood:    Anxious   Affect:    Appropriate   Thought Content:  Clear   Thought Form:  Coherent  Logical   Insight:    Good     Assessments completed prior to this visit:    The following assessments were completed by patient for this visit:  PHQ2:       4/3/2023     1:17 PM 12/7/2022     2:32 PM 10/21/2022     9:32 AM 3/27/2022     3:12 PM 3/27/2022     3:09 PM 11/19/2021     8:34 AM 10/17/2021     5:49 PM   PHQ-2 ( 1999 [employer])   Q1: Little interest or pleasure in doing things 0 0 0 1 1  2   Q2: Feeling down, depressed or hopeless 0 1 0 1 1  2   PHQ-2 Score 0 1 0 2 2  4   PHQ-2 Total Score (12-17 Years)- Positive if 3 or more points; Administer PHQ-A if positive       4   Q1: Little interest or pleasure in doing things  Not at all  Several days Several days  More than half the days   Q2: Feeling down, depressed or hopeless  Several days  Several days Several days  More than half the days   PHQ-2 Score  1  2 2 Incomplete    Incomplete 4     PHQ9:       1/11/2019     2:41 PM 8/31/2020     2:13 PM 8/23/2021     9:12 AM 10/17/2021     5:49 PM 11/19/2021     8:34 AM 12/7/2022     2:29 PM 12/4/2023     3:51 PM   PHQ-9 SCORE   PHQ-9 Total Score  MyChart 21 (Severe depression)  8 (Mild depression) 12 (Moderate depression) 14 (Moderate depression) 3 (Minimal depression) 9 (Mild depression)   PHQ-9 Total Score 21 11 8 12 14 3 9     GAD2:       12/1/2023    11:23 AM   LYNETTE-2   Feeling nervous, anxious, or on edge 0    0   Not being able to stop or control worrying 0    0   LYNETTE-2 Total Score 0    0     GAD7:       3/19/2018     3:13 PM 3/23/2018     1:09 PM 4/3/2018     1:41 PM 1/11/2019     2:41 PM 8/23/2021     9:12 AM 12/7/2022     2:29 PM 12/4/2023     4:45 PM   LYNETTE-7 SCORE   Total Score 1 (minimal anxiety)  2 (minimal anxiety) 19 (severe anxiety) 1 (minimal anxiety) 0 (minimal anxiety)    Total Score 1 1 2 19 1 0 1     CAGE-AID:       12/1/2023    11:25 AM   CAGE-AID Total Score   Total Score 0    0   Total Score MyChart 0 (A total score of 2 or greater is considered clinically significant)     PROMIS 10-Global Health (all questions and answers displayed):       12/1/2023    11:24 AM   PROMIS 10   In general, would you say your health is: Good   In general, would you say your quality of life is: Excellent   In general, how would you rate your physical health? Good   In general, how would you rate your mental health, including your mood and your ability to think? Good   In general, how would you rate your satisfaction with your social activities and relationships? Good   In general, please rate how well you carry out your usual social activities and roles Good   To what extent are you able to carry out your everyday physical activities such as walking, climbing stairs, carrying groceries, or moving a chair? Mostly   In the past 7 days, how often have you been bothered by emotional problems such as feeling anxious, depressed, or irritable? Rarely   In the past 7 days, how would you rate your fatigue on average? Moderate   In the past 7 days, how would you rate your pain on average, where 0 means no pain, and 10 means worst imaginable pain? 4   In general,  Coronary artery disease involving native coronary artery of native heart without angina pectoris    Essential hypertension    GI bleed    Hyperlipidemia LDL goal <100       No Known Allergies    Current Outpatient Medications   Medication Sig Dispense Refill    rivaroxaban (XARELTO) 20 MG TABS tablet Take 20 mg by mouth      albuterol sulfate  (90 Base) MCG/ACT inhaler Inhale 2 puffs into the lungs 4 times daily as needed for Wheezing 1 Inhaler 0    aspirin 81 MG EC tablet Take 81 mg by mouth daily      potassium chloride (KLOR-CON M) 10 MEQ extended release tablet TAKE 1 TABLET BY MOUTH  DAILY (Patient taking differently: Take 10 mEq by mouth daily Indications: pt. takes three time a week ) 90 tablet 3    metoprolol tartrate (LOPRESSOR) 50 MG tablet Take 100 mg in the morning and 50 mg at night by mouth. (Patient taking differently: At night) 60 tablet 0    amLODIPine (NORVASC) 2.5 MG tablet TAKE 1 TABLET BY MOUTH  DAILY 90 tablet 3    furosemide (LASIX) 20 MG tablet TAKE 1 TABLET BY MOUTH  DAILY (Patient taking differently: Take 20 mg by mouth daily Indications: pt.is taking 3days a week ) 90 tablet 3    meclizine (ANTIVERT) 25 MG tablet Take 25 mg by mouth 2 times daily as needed      dofetilide (TIKOSYN) 250 MCG capsule Take 1 capsule by mouth every 12 hours 60 capsule 3    Multiple Vitamins-Minerals (PRESERVISION AREDS 2 PO) Take 1 tablet by mouth 2 times daily      Cholecalciferol (VITAMIN D) 2000 UNITS CAPS capsule Take 1 capsule by mouth daily       pravastatin (PRAVACHOL) 40 MG tablet Take 40 mg by mouth daily.  rivaroxaban (XARELTO) 20 MG TABS tablet Take 1 tablet by mouth daily (with breakfast) for 1 day LOT#[id number]  EXP 3/21 (Patient taking differently: Take 20 mg by mouth Daily with supper LOT#[id number]  EXP 3/21) 28 tablet 0     No current facility-administered medications for this visit.         Social History     Socioeconomic History    Marital status:      Spouse would you say your health is: 3    3   In general, would you say your quality of life is: 5    5   In general, how would you rate your physical health? 3    3   In general, how would you rate your mental health, including your mood and your ability to think? 3    3   In general, how would you rate your satisfaction with your social activities and relationships? 3    3   In general, please rate how well you carry out your usual social activities and roles. (This includes activities at home, at work and in your community, and responsibilities as a parent, child, spouse, employee, friend, etc.) 3    3   To what extent are you able to carry out your everyday physical activities such as walking, climbing stairs, carrying groceries, or moving a chair? 4    4   In the past 7 days, how often have you been bothered by emotional problems such as feeling anxious, depressed, or irritable? 2    2   In the past 7 days, how would you rate your fatigue on average? 3    3   In the past 7 days, how would you rate your pain on average, where 0 means no pain, and 10 means worst imaginable pain? 4    4   Global Mental Health Score 15    15   Global Physical Health Score 13    13   PROMIS TOTAL - SUBSCORES 28    28     [place] Suicide Severity Rating Scale (Short Version)      10/6/2022    11:49 AM 10/20/2022    11:44 AM 12/4/2023     4:27 PM   [place] Suicide Severity Rating (Short Version)   Over the past 2 weeks have you felt down, depressed, or hopeless? no no    Over the past 2 weeks have you had thoughts of killing yourself? no no    Have you ever attempted to kill yourself? no no    1. Wish to be Dead (Since Last Contact)   N   2. Non-Specific Active Suicidal Thoughts (Since Last Contact)   N   Actual Attempt (Since Last Contact)   N   Has subject engaged in non-suicidal self-injurious behavior? (Since Last Contact)   N   Interrupted Attempts (Since Last Contact)   N   Aborted or Self-Interrupted Attempt (Since Last Contact)   N    Preparatory Acts or Behavior (Since Last Contact)   N   Suicide (Since Last Contact)   N   Calculated C-SSRS Risk Score (Since Last Contact)   No Risk Indicated     Safety Issues and Plan for Safety and Risk Management:   [place] Suicide Severity Rating Scale (Short Version)      10/6/2022    11:49 AM 10/20/2022    11:44 AM 12/4/2023     4:27 PM   [place] Suicide Severity Rating (Short Version)   Over the past 2 weeks have you felt down, depressed, or hopeless? no no    Over the past 2 weeks have you had thoughts of killing yourself? no no    Have you ever attempted to kill yourself? no no    1. Wish to be Dead (Since Last Contact)   N   2. Non-Specific Active Suicidal Thoughts (Since Last Contact)   N   Actual Attempt (Since Last Contact)   N   Has subject engaged in non-suicidal self-injurious behavior? (Since Last Contact)   N   Interrupted Attempts (Since Last Contact)   N   Aborted or Self-Interrupted Attempt (Since Last Contact)   N   Preparatory Acts or Behavior (Since Last Contact)   N   Suicide (Since Last Contact)   N   Calculated C-SSRS Risk Score (Since Last Contact)   No Risk Indicated     Patient denies current fears or concerns for personal safety.  Patient denies current or recent suicidal ideation or behaviors.  Patient denies current or recent homicidal ideation or behaviors.  Patient denies current or recent self injurious behavior or ideation.  Patient denies other safety concerns.  Recommended that patient call 911 or go to the local ED should there be a change in any of these risk factors. Or National suicidal prevention: 988  Patient reports there are no firearms in the house.     Diagnostic Criteria:  Adjustment Disorder  A. The development of emotional or behavioral symptoms in response to an identifiable stressor(s) occurring within 3 months of the onset of the stressor(s)  B. These symptoms or behaviors are clinically significant, as evidenced by one or both of the following:  C. The  name: Not on file    Number of children: Not on file    Years of education: Not on file    Highest education level: Not on file   Occupational History    Not on file   Social Needs    Financial resource strain: Not on file    Food insecurity:     Worry: Not on file     Inability: Not on file    Transportation needs:     Medical: Not on file     Non-medical: Not on file   Tobacco Use    Smoking status: Former Smoker     Packs/day: 0.50     Years: 3.00     Pack years: 1.50     Last attempt to quit: 1973     Years since quittin.6    Smokeless tobacco: Never Used    Tobacco comment: Quit smoking in    Substance and Sexual Activity    Alcohol use: No    Drug use: No    Sexual activity: Not on file   Lifestyle    Physical activity:     Days per week: Not on file     Minutes per session: Not on file    Stress: Not on file   Relationships    Social connections:     Talks on phone: Not on file     Gets together: Not on file     Attends Buddhist service: Not on file     Active member of club or organization: Not on file     Attends meetings of clubs or organizations: Not on file     Relationship status: Not on file    Intimate partner violence:     Fear of current or ex partner: Not on file     Emotionally abused: Not on file     Physically abused: Not on file     Forced sexual activity: Not on file   Other Topics Concern    Not on file   Social History Narrative    1 cup coffee daily; occ pop       History reviewed. No pertinent family history. Review of Systems:  Heart: as above   Lungs: as above   Eyes: denies changes in vision or discharge. Ears: denies changes in hearing or pain. Nose: denies epistaxis or masses   Throat: denies sore throat or trouble swallowing. Neuro: denies numbness, tingling, tremors. Skin: denies rashes or itching. : denies hematuria, dysuria   GI: denies vomiting, diarrhea   Psych: denies mood changed, anxiety, depression.   All other systems "stress-related disturbance does not meet criteria for another disorder & is not not an exacerbation of another mental disorder  D. The symptoms do not represent normal bereavement  E. Once the stressor or its consequences have terminated, the symptoms do not persist for more than an additional 6 months       * Adjustment Disorder with Anxiety: The predominant manfestations are symptoms such as nervousness, worry, or jitteriness, or, in children separation anxiety from major attachment figures    DSM5 Diagnoses: (Sustained by DSM5 Criteria Listed Above)  Diagnoses: Adjustment Disorders  309.24 (F43.22) With anxiety  Psychosocial & Contextual Factors: \" Sabotaging my own job, Anxiety\"  Intervention:  AIDET was performed. working rapport has started. Assessment was initiated.  Future appts were scheduled. 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   Collateral Reports Completed:  Not Applicable    PLAN: (Homework, other):  1. Provider will continue Diagnostic Assessment.  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     2. Provider recommended the following referrals: not discussed today.      3.  Suicide Risk and Safety Concerns were assessed for Nasreen Galarza.    Patient meets the following risk assessment and triage: Patient denied any current/recent/lifetime history of suicidal ideation and/or behaviors.  No safety plan indicated at this time.     MAGGI Mcmullen  December 4, 2023       Answers submitted by the patient for this visit:  Patient Health Questionnaire (Submitted on 12/4/2023)  If you checked off any problems, how difficult have these problems made it for you to do your work, take care of things at home, or get along with other people?: Somewhat difficult  PHQ9 TOTAL SCORE: 9    "

## 2023-12-07 ENCOUNTER — MYC MEDICAL ADVICE (OUTPATIENT)
Dept: FAMILY MEDICINE | Facility: CLINIC | Age: 68
End: 2023-12-07
Payer: COMMERCIAL

## 2023-12-07 DIAGNOSIS — L30.9 PERIORBITAL DERMATITIS: Primary | ICD-10-CM

## 2023-12-08 NOTE — TELEPHONE ENCOUNTER
I agree with your recommendation.  Advise patient to send us an e-visit if symptoms persisted.  MD Leyla

## 2023-12-21 ENCOUNTER — TRANSFERRED RECORDS (OUTPATIENT)
Dept: HEALTH INFORMATION MANAGEMENT | Facility: CLINIC | Age: 68
End: 2023-12-21

## 2023-12-21 ENCOUNTER — E-VISIT (OUTPATIENT)
Dept: FAMILY MEDICINE | Facility: CLINIC | Age: 68
End: 2023-12-21
Payer: COMMERCIAL

## 2023-12-21 DIAGNOSIS — L30.9 PERIORBITAL DERMATITIS: Primary | ICD-10-CM

## 2023-12-21 PROCEDURE — 99421 OL DIG E/M SVC 5-10 MIN: CPT | Performed by: FAMILY MEDICINE

## 2023-12-21 RX ORDER — TACROLIMUS 1 MG/G
OINTMENT TOPICAL 2 TIMES DAILY
Qty: 100 G | Refills: 0 | Status: SHIPPED | OUTPATIENT
Start: 2023-12-21

## 2023-12-21 NOTE — TELEPHONE ENCOUNTER
Tacrolimus refilled.  Advised patient to send us an e-visit with pictures of the eyes/rash.  [place] SMITH

## 2023-12-22 RX ORDER — TRIAMCINOLONE ACETONIDE 1 MG/G
OINTMENT TOPICAL 2 TIMES DAILY
Qty: 80 G | Refills: 1 | Status: SHIPPED | OUTPATIENT
Start: 2023-12-22

## 2024-01-04 ASSESSMENT — ENCOUNTER SYMPTOMS
CHILLS: 0
CONSTIPATION: 0
SORE THROAT: 0
DYSURIA: 0
SHORTNESS OF BREATH: 0
ABDOMINAL PAIN: 0
PARESTHESIAS: 0
HEMATURIA: 0
ARTHRALGIAS: 1
HEMATOCHEZIA: 0
FEVER: 0
PALPITATIONS: 0
EYE PAIN: 0
FREQUENCY: 0
MYALGIAS: 1
BREAST MASS: 0
WEAKNESS: 1
DIZZINESS: 0
DIARRHEA: 0
JOINT SWELLING: 0
NAUSEA: 0
HEADACHES: 1
HEARTBURN: 0
COUGH: 0
NERVOUS/ANXIOUS: 0

## 2024-01-04 ASSESSMENT — ACTIVITIES OF DAILY LIVING (ADL): CURRENT_FUNCTION: NO ASSISTANCE NEEDED

## 2024-01-05 ENCOUNTER — OFFICE VISIT (OUTPATIENT)
Dept: FAMILY MEDICINE | Facility: CLINIC | Age: 69
End: 2024-01-05
Payer: COMMERCIAL

## 2024-01-05 VITALS
TEMPERATURE: 97.1 F | RESPIRATION RATE: 18 BRPM | SYSTOLIC BLOOD PRESSURE: 119 MMHG | HEART RATE: 44 BPM | DIASTOLIC BLOOD PRESSURE: 78 MMHG | HEIGHT: 64 IN | WEIGHT: 203.5 LBS | BODY MASS INDEX: 34.74 KG/M2 | OXYGEN SATURATION: 94 %

## 2024-01-05 DIAGNOSIS — Z00.00 ENCOUNTER FOR MEDICARE ANNUAL WELLNESS EXAM: Primary | ICD-10-CM

## 2024-01-05 DIAGNOSIS — R94.31 ABNORMAL ELECTROCARDIOGRAM (ECG) (EKG): ICD-10-CM

## 2024-01-05 DIAGNOSIS — R00.1 SINUS BRADYCARDIA: ICD-10-CM

## 2024-01-05 PROCEDURE — 93000 ELECTROCARDIOGRAM COMPLETE: CPT | Performed by: FAMILY MEDICINE

## 2024-01-05 PROCEDURE — G0439 PPPS, SUBSEQ VISIT: HCPCS | Performed by: FAMILY MEDICINE

## 2024-01-05 ASSESSMENT — ENCOUNTER SYMPTOMS
HEADACHES: 1
DIARRHEA: 0
NAUSEA: 0
ARTHRALGIAS: 1
CONSTIPATION: 0
FREQUENCY: 0
DIZZINESS: 0
WEAKNESS: 1
SHORTNESS OF BREATH: 0
HEMATOCHEZIA: 0
SORE THROAT: 0
FEVER: 0
ABDOMINAL PAIN: 0
EYE PAIN: 0
PARESTHESIAS: 0
DYSURIA: 0
MYALGIAS: 1
PALPITATIONS: 0
HEMATURIA: 0
JOINT SWELLING: 0
COUGH: 0
CHILLS: 0
BREAST MASS: 0
HEARTBURN: 0
NERVOUS/ANXIOUS: 0

## 2024-01-05 ASSESSMENT — PATIENT HEALTH QUESTIONNAIRE - PHQ9
10. IF YOU CHECKED OFF ANY PROBLEMS, HOW DIFFICULT HAVE THESE PROBLEMS MADE IT FOR YOU TO DO YOUR WORK, TAKE CARE OF THINGS AT HOME, OR GET ALONG WITH OTHER PEOPLE: SOMEWHAT DIFFICULT
SUM OF ALL RESPONSES TO PHQ QUESTIONS 1-9: 9
SUM OF ALL RESPONSES TO PHQ QUESTIONS 1-9: 9

## 2024-01-05 ASSESSMENT — PAIN SCALES - GENERAL: PAINLEVEL: NO PAIN (0)

## 2024-01-05 ASSESSMENT — ACTIVITIES OF DAILY LIVING (ADL): CURRENT_FUNCTION: NO ASSISTANCE NEEDED

## 2024-01-05 NOTE — PATIENT INSTRUCTIONS
"Patient Education   Personalized Prevention Plan  You are due for the preventive services outlined below.  Your care team is available to assist you in scheduling these services.  If you have already completed any of these items, please share that information with your care team to update in your medical record.  Health Maintenance Due   Topic Date Due     ANNUAL REVIEW OF HM ORDERS  12/14/2023     Learning About Being Physically Active  What is physical activity?     Being physically active means doing any kind of activity that gets your body moving.  The types of physical activity that can help you get fit and stay healthy include:  Aerobic or \"cardio\" activities. These make your heart beat faster and make you breathe harder, such as brisk walking, riding a bike, or running. They strengthen your heart and lungs and build up your endurance.  Strength training activities. These make your muscles work against, or \"resist,\" something. Examples include lifting weights or doing push-ups. These activities help tone and strengthen your muscles and bones.  Stretches. These let you move your joints and muscles through their full range of motion. Stretching helps you be more flexible.  Reaching a balance between these three types of physical activity is important because each one contributes to your overall fitness.  What are the benefits of being active?  Being active is one of the best things you can do for your health. It helps you to:  Feel stronger and have more energy to do all the things you like to do.  Focus better at school or work.  Feel, think, and sleep better.  Reach and stay at a healthy weight.  Lose fat and build lean muscle.  Lower your risk for serious health problems, including diabetes, heart attack, high blood pressure, and some cancers.  Keep your heart, lungs, bones, muscles, and joints strong and healthy.  How can you make being active part of your life?  Start slowly. Make it your long-term goal to " "get at least 30 minutes of exercise on most days of the week. Walking is a good choice. You also may want to do other activities, such as running, swimming, cycling, or playing tennis or team sports.  Pick activities that you like--ones that make your heart beat faster, your muscles stronger, and your muscles and joints more flexible. If you find more than one thing you like doing, do them all. You don't have to do the same thing every day.  Get your heart pumping every day. Any activity that makes your heart beat faster and keeps it at that rate for a while counts.  Here are some great ways to get your heart beating faster:  Go for a brisk walk, run, or hike.  Go for a swim or bike ride.  Take an online exercise class or dance.  Play a game of touch football, basketball, or soccer.  Play tennis, pickleball, or racquetball.  Climb stairs.  Even some household chores can be aerobic. Just do them at a faster pace. Raking or mowing the lawn, sweeping the garage, and vacuuming and cleaning your home all can help get your heart rate up.  Strengthen your muscles during the week. You don't have to lift heavy weights or grow big, bulky muscles to get stronger. Doing a few simple activities that make your muscles work against, or \"resist,\" something can help you get stronger. Aim for at least twice a week.  For example, you can:  Do push-ups or sit-ups, which use your own body weight as resistance.  Lift weights or dumbbells or use stretch bands at home or in a gym or community center.  Stretch your muscles often. Stretching will help you as you become more active. It can help you stay flexible and loosen tight muscles. It can also help improve your balance and posture and can be a great way to relax.  Be sure to stretch the muscles you'll be using when you work out. It's best to warm your muscles slightly before you stretch them. Walk or do some other light aerobic activity for a few minutes. Then start stretching.  When " "you stretch your muscles:  Do it slowly. Stretching is not about going fast or making sudden movements.  Don't push or bounce during a stretch.  Hold each stretch for at least 15 to 30 seconds, if you can. You should feel a stretch in the muscle, but not pain.  Breathe out as you do the stretch. Then breathe in as you hold the stretch. Don't hold your breath.  If you're worried about how more activity might affect your health, have a checkup before you start. Follow any special advice your doctor gives you for getting a smart start.  Where can you learn more?  Go to https://www.[employer]/patiented  Enter W332 in the search box to learn more about \"Learning About Being Physically Active.\"  Current as of: June 6, 2023               Content Version: 13.8    6296-1959 [employer].   Care instructions adapted under license by your healthcare professional. If you have questions about a medical condition or this instruction, always ask your healthcare professional. [employer] disclaims any warranty or liability for your use of this information.      Learning About Dietary Guidelines  What are the Dietary Guidelines for Americans?     Dietary Guidelines for Americans provide tips for eating well and staying healthy. This helps reduce the risk for long-term (chronic) diseases.  These guidelines recommend that you:  Eat and drink the right amount for you. The [place] government's food guide is called MyPlate. It can help you make your own well-balanced eating plan.  Try to balance your eating with your activity. This helps you stay at a healthy weight.  Drink alcohol in moderation, if at all.  Limit foods high in salt, saturated fat, trans fat, and added sugar.  These guidelines are from the [place] Department of Agriculture and the [place] Department of Health and Human Services. They are updated every 5 years.  What is MyPlate?  MyPlate is the [place] government's food guide. It can help you make your " "own well-balanced eating plan. A balanced eating plan means that you eat enough, but not too much, and that your food gives you the nutrients you need to stay healthy.  MyPlate focuses on eating plenty of whole grains, fruits, and vegetables, and on limiting fat and sugar. It is available online at www.ChooseMyPlate.gov.  How can you get started?  If you're trying to eat healthier, you can slowly change your eating habits over time. You don't have to make big changes all at once. Start by adding one or two healthy foods to your meals each day.  Grains  Choose whole-grain breads and cereals and whole-wheat pasta and whole-grain crackers.  Vegetables  Eat a variety of vegetables every day. They have lots of nutrients and are part of a heart-healthy diet.  Fruits  Eat a variety of fruits every day. Fruits contain lots of nutrients. Choose fresh fruit instead of fruit juice.  Protein foods  Choose fish and lean poultry more often. Eat red meat and fried meats less often. Dried beans, tofu, and nuts are also good sources of protein.  Dairy  Choose low-fat or fat-free products from this food group. If you have problems digesting milk, try eating cheese or yogurt instead.  Fats and oils  Limit fats and oils if you're trying to cut calories. Choose healthy fats when you cook. These include canola oil and olive oil.  Where can you learn more?  Go to https://www.[employer].net/patiented  Enter D676 in the search box to learn more about \"Learning About Dietary Guidelines.\"  Current as of: February 28, 2023               Content Version: 13.8    3340-8800 [employer].   Care instructions adapted under license by your healthcare professional. If you have questions about a medical condition or this instruction, always ask your healthcare professional. [employer] disclaims any warranty or liability for your use of this information.      Hearing Loss: Care Instructions  Overview     Hearing loss is a " sudden or slow decrease in how well you hear. It can range from slight to profound. Permanent hearing loss can occur with aging. It also can happen when you are exposed long-term to loud noise. Examples include listening to loud music, riding motorcycles, or being around other loud machines.  Hearing loss can affect your work and home life. It can make you feel lonely or depressed. You may feel that you have lost your independence. But hearing aids and other devices can help you hear better and feel connected to others.  Follow-up care is a key part of your treatment and safety. Be sure to make and go to all appointments, and call your doctor if you are having problems. It's also a good idea to know your test results and keep a list of the medicines you take.  How can you care for yourself at home?  Avoid loud noises whenever possible. This helps keep your hearing from getting worse.  Always wear hearing protection around loud noises.  Wear a hearing aid as directed.  A professional can help you pick a hearing aid that will work best for you.  You can also get hearing aids over the counter for mild to moderate hearing loss.  Have hearing tests as your doctor suggests. They can show whether your hearing has changed. Your hearing aid may need to be adjusted.  Use other devices as needed. These may include:  Telephone amplifiers and hearing aids that can connect to a television, stereo, radio, or microphone.  Devices that use lights or vibrations. These alert you to the doorbell, a ringing telephone, or a baby monitor.  Television closed-captioning. This shows the words at the bottom of the screen. Most new TVs can do this.  TTY (text telephone). This lets you type messages back and forth on the telephone instead of talking or listening. These devices are also called TDD. When messages are typed on the keyboard, they are sent over the phone line to a receiving TTY. The message is shown on a monitor.  Use text  "messaging, social media, and email if it is hard for you to communicate by telephone.  Try to learn a listening technique called speechreading. It is not lipreading. You pay attention to people's gestures, expressions, posture, and tone of voice. These clues can help you understand what a person is saying. Face the person you are talking to, and have them face you. Make sure the lighting is good. You need to see the other person's face clearly.  Think about counseling if you need help to adjust to your hearing loss.  When should you call for help?  Watch closely for changes in your health, and be sure to contact your doctor if:    You think your hearing is getting worse.     You have new symptoms, such as dizziness or nausea.   Where can you learn more?  Go to https://www."[employer]net/patiented  Enter R798 in the search box to learn more about \"Hearing Loss: Care Instructions.\"  Current as of: February 28, 2023               Content Version: 13.8    6925-5456 [employer].   Care instructions adapted under license by your healthcare professional. If you have questions about a medical condition or this instruction, always ask your healthcare professional. [employer] disclaims any warranty or liability for your use of this information.      Your Health Risk Assessment indicates you feel you are not in good emotional health.    Recreation   Recreation is not limited to sports and team events. It includes any activity that provides relaxation, interest, enjoyment, and exercise. Recreation provides an outlet for physical, mental, and social energy. It can give a sense of worth and achievement. It can help you stay healthy.    Mental Exercise and Social Involvement  Mental and emotional health is as important as physical health. Keep in touch with friends and family. Stay as active as possible. Continue to learn and challenge yourself.   Things you can do to stay mentally active are:  Learn " something new, like a foreign language or musical instrument.   Play SCRABBLE or do crossword puzzles. If you cannot find people to play these games with you at home, you can play them with others on your computer through the Internet.   Join a games club--anything from card games to chess or checkers or lawn bowling.   Start a new hobby.   Go back to school.   Volunteer.   Read.   Keep up with world events.  Learning About Depression Screening  What is depression screening?  Depression screening is a way to see if you have depression symptoms. It may be done by a doctor or counselor. It's often part of a routine checkup. That's because your mental health is just as important as your physical health.  Depression is a mental health condition that affects how you feel, think, and act. You may:  Have less energy.  Lose interest in your daily activities.  Feel sad and grouchy for a long time.  Depression is very common. It affects people of all ages.  Many things can lead to depression. Some people become depressed after they have a stroke or find out they have a major illness like cancer or heart disease. The death of a loved one or a breakup may lead to depression. It can run in families. Most experts believe that a combination of inherited genes and stressful life events can cause it.  What happens during screening?  You may be asked to fill out a form about your depression symptoms. You and the doctor will discuss your answers. The doctor may ask you more questions to learn more about how you think, act, and feel.  What happens after screening?  If you have symptoms of depression, your doctor will talk to you about your options.  Doctors usually treat depression with medicines or counseling. Often, combining the two works best. Many people don't get help because they think that they'll get over the depression on their own. But people with depression may not get better unless they get treatment.  The cause of  "depression is not well understood. There may be many factors involved. But if you have depression, it's not your fault.  A serious symptom of depression is thinking about death or suicide. If you or someone you care about talks about this or about feeling hopeless, get help right away.  It's important to know that depression can be treated. Medicine, counseling, and self-care may help.  Where can you learn more?  Go to https://www.[employer].net/patiented  Enter T185 in the search box to learn more about \"Learning About Depression Screening.\"  Current as of: June 25, 2023               Content Version: 13.8    8967-8005 [employer].   Care instructions adapted under license by your healthcare professional. If you have questions about a medical condition or this instruction, always ask your healthcare professional. [employer] disclaims any warranty or liability for your use of this information.         "

## 2024-01-05 NOTE — PROGRESS NOTES
"SUBJECTIVE:   Nasreen is a 68 year old, presenting for the following:  Physical (AWV)        1/5/2024     9:14 AM   Additional Questions   Roomed by Ammy ENRIQUE       Are you in the first 12 months of your Medicare coverage?  No    Healthy Habits:     In general, how would you rate your overall health?  Good    Frequency of exercise:  None    Do you usually eat at least 4 servings of fruit and vegetables a day, include whole grains    & fiber and avoid regularly eating high fat or \"junk\" foods?  No    Taking medications regularly:  Yes    Medication side effects:  None    Ability to successfully perform activities of daily living:  No assistance needed    Home Safety:  No safety concerns identified    Hearing Impairment:  Difficulty following a conversation in a noisy restaurant or crowded room, feel that people are mumbling or not speaking clearly and need to ask people to speak up or repeat themselves    In the past 6 months, have you been bothered by leaking of urine?  No    In general, how would you rate your overall mental or emotional health?  Fair    Additional concerns today:  No    Wellness Visit Notes:    -Last mammo done 9/2023, due 9/2024 (impression: Negative)  -Last DEXA done 3/2023, due 3/2028 (impression: Normal bone density)  -Last colon cancer screening done 12/2022, due 12/2025 (impression: Normal, no specimens collected, Repeat 3 years).    -Immunizations: Up to date    -Hearing: Declines Audiology referral.     Today's PHQ-2 Score:       1/4/2024     9:00 AM   PHQ-2 ( 1999 [employer])   Q1: Little interest or pleasure in doing things 1   Q2: Feeling down, depressed or hopeless 1   PHQ-2 Score 2   Q1: Little interest or pleasure in doing things Several days   Q2: Feeling down, depressed or hopeless Several days   PHQ-2 Score 2     Have you ever done Advance Care Planning? (For example, a Health Directive, POLST, or a discussion with a medical provider or your loved ones about your wishes): Yes, patient " [facility] has an Advance Care Planning document and will bring a copy to the clinic.    Fall risk  Fallen 2 or more times in the past year?: Yes  Any fall with injury in the past year?: Yes  click delete button to remove this line now  Cognitive Screening   1) Repeat 3 items (Leader, Season, Table)    2) Clock draw: NORMAL  3) 3 item recall: Recalls 3 objects  Results: 3 items recalled: COGNITIVE IMPAIRMENT LESS LIKELY    Mini-CogTM Copyright MO[employer]. Licensed by the author for use in [facility]; reprinted with permission (zoey@[facility]). All rights reserved.      Do you have sleep apnea, excessive snoring or daytime drowsiness? : no    Reviewed and updated as needed this visit by clinical staff   Tobacco  Allergies  Meds              Reviewed and updated as needed this visit by Provider                 Social History     Tobacco Use    Smoking status: Former     Packs/day: 2.00     Years: 18.00     Additional pack years: 0.00     Total pack years: 36.00     Types: Cigarettes     Start date: 1970     Quit date: 10/18/1988     Years since quittin.2    Smokeless tobacco: Never   Substance Use Topics    Alcohol use: Yes     Comment: occ 1x/month             2024     9:00 AM   Alcohol Use   Prescreen: >3 drinks/day or >7 drinks/week? No          No data to display              Do you have a current opioid prescription? No  Do you use any other controlled substances or medications that are not prescribed by a provider? None    Current providers sharing in care for this patient include:  Patient Care Team:  Noemi Mayer MD as PCP - General (Family Practice)  Ubaldo Combs MD as MD (Neurology)  Maria Victoria Nair MD as Resident (Student in organized health care education/training program)  Noemi Mayer MD as Assigned PCP  Eliot Mcdonald MD as Assigned Surgical Provider  Noemi Mayre MD as Referring Physician (Family Medicine)  Betty Valdovinos PA-C as  Physician Assistant (Dermatology)  Mecca Ramos LICSW as  ( - Clinical)    The following health maintenance items are reviewed in Epic and correct as of today:  Health Maintenance   Topic Date Due    TSH W/FREE T4 REFLEX  08/02/2024    MAMMO SCREENING  09/28/2024    MEDICARE ANNUAL WELLNESS VISIT  01/05/2025    ANNUAL REVIEW OF HM ORDERS  01/05/2025    FALL RISK ASSESSMENT  01/05/2025    COLORECTAL CANCER SCREENING  12/29/2025    PAP  12/14/2027    DEXA  03/10/2028    LIPID  08/02/2028    ADVANCE CARE PLANNING  01/05/2029    DTAP/TDAP/TD IMMUNIZATION (3 - Td or Tdap) 03/30/2032    HEPATITIS C SCREENING  Completed    PHQ-2 (once per calendar year)  Completed    INFLUENZA VACCINE  Completed    Pneumococcal Vaccine: 65+ Years  Completed    ZOSTER IMMUNIZATION  Completed    RSV VACCINE (Pregnancy & 60+)  Completed    COVID-19 Vaccine  Completed    IPV IMMUNIZATION  Aged Out    HPV IMMUNIZATION  Aged Out    MENINGITIS IMMUNIZATION  Aged Out    RSV MONOCLONAL ANTIBODY  Aged Out         9/13/2021     1:43 PM   Breast CA Risk Assessment (FHS-7)   Do you have a family history of breast, colon, or ovarian cancer? No / Unknown     Review of Systems   Constitutional:  Negative for chills and fever.   HENT:  Positive for ear pain and hearing loss. Negative for congestion and sore throat.    Eyes:  Negative for pain and visual disturbance.   Respiratory:  Negative for cough and shortness of breath.    Cardiovascular:  Negative for chest pain, palpitations and peripheral edema.   Gastrointestinal:  Negative for abdominal pain, constipation, diarrhea, heartburn, hematochezia and nausea.   Breasts:  Negative for tenderness, breast mass and discharge.   Genitourinary:  Negative for dysuria, frequency, genital sores, hematuria, pelvic pain, urgency, vaginal bleeding and vaginal discharge.   Musculoskeletal:  Positive for arthralgias and myalgias. Negative for joint swelling.   Skin:  Negative for  "rash.   Neurological:  Positive for weakness and headaches. Negative for dizziness and paresthesias.   Psychiatric/Behavioral:  Negative for mood changes. The patient is not nervous/anxious.      OBJECTIVE:   /78   Pulse (!) 44   Temp 97.1  F (36.2  C) (Temporal)   Resp 18   Ht 1.613 m (5' 3.5\")   Wt 92.3 kg (203 lb 8 oz)   LMP  (LMP Unknown)   SpO2 94%   Breastfeeding No   BMI 35.48 kg/m   Estimated body mass index is 35.48 kg/m  as calculated from the following:    Height as of this encounter: 1.613 m (5' 3.5\").    Weight as of this encounter: 92.3 kg (203 lb 8 oz).  Physical Exam  GENERAL: healthy, alert and no distress  EYES: Eyes grossly normal to inspection, PERRL and conjunctivae and sclerae normal  HENT: ear canals and TM's normal, nose and mouth without ulcers or lesions  NECK: no adenopathy, no asymmetry, masses, or scars and thyroid normal to palpation  RESP: lungs clear to auscultation - no rales, rhonchi or wheezes  BREAST: normal without masses, tenderness or nipple discharge and no palpable axillary masses or adenopathy  CV: regular rate and rhythm, normal S1 S2, no S3 or S4, no murmur, click or rub, no peripheral edema and peripheral pulses strong  ABDOMEN: soft, nontender, no hepatosplenomegaly, no masses and bowel sounds normal  MS: no gross musculoskeletal defects noted, no edema  SKIN: no suspicious lesions or rashes  NEURO: Normal strength and tone, mentation intact and speech normal  PSYCH: mentation appears normal, affect normal/bright    Diagnostic Test Results:  Labs reviewed in Epic    ASSESSMENT / PLAN:       ICD-10-CM    1. Encounter for Medicare annual wellness exam  Z00.00 EKG 12-lead complete w/read - Clinics      2. Sinus bradycardia  R00.1 Echocardiogram Complete     Adult Leadless EKG Monitor 3 to 7 Days      3. Abnormal electrocardiogram (ECG) (EKG)  R94.31 Echocardiogram Complete          COUNSELING:  Reviewed preventive health counseling, as reflected in patient " "instructions      BMI:   Estimated body mass index is 35.48 kg/m  as calculated from the following:    Height as of this encounter: 1.613 m (5' 3.5\").    Weight as of this encounter: 92.3 kg (203 lb 8 oz).     She reports that she quit smoking about 35 years ago. Her smoking use included cigarettes. She started smoking about 54 years ago. She has a 36 pack-year smoking history. She has never used smokeless tobacco.      Appropriate preventive services were discussed with this patient, including applicable screening as appropriate for fall prevention, nutrition, physical activity, Tobacco-use cessation, weight loss and cognition.  Checklist reviewing preventive services available has been given to the patient.    Reviewed patients plan of care and provided an AVS. The Basic Care Plan (routine screening as documented in Health Maintenance) for Nasreen meets the Care Plan requirement. This Care Plan has been established and reviewed with the Patient.      Noemi Mayer MD  [facility]    Identified Health Risks:  I have reviewed Opioid Use Disorder and Substance Use Disorder risk factors and made any needed referrals. She is at risk for lack of exercise and has been provided with information to increase physical activity for the benefit of her well-being.  The patient was counseled and encouraged to consider modifying their diet and eating habits. She was provided with information on recommended healthy diet options.  The patient was provided with written information regarding signs of hearing loss.  The patient was provided with suggestions to help her develop a healthy emotional lifestyle.  Answers submitted by the patient for this visit:  Patient Health Questionnaire (Submitted on 1/5/2024)  If you checked off any problems, how difficult have these problems made it for you to do your work, take care of things at home, or get along with other people?: Somewhat difficult  PHQ9 TOTAL SCORE: 9    "

## 2024-01-15 ENCOUNTER — VIRTUAL VISIT (OUTPATIENT)
Dept: PSYCHOLOGY | Facility: CLINIC | Age: 69
End: 2024-01-15
Payer: COMMERCIAL

## 2024-01-15 DIAGNOSIS — F41.1 GENERALIZED ANXIETY DISORDER: ICD-10-CM

## 2024-01-15 DIAGNOSIS — F43.22 ADJUSTMENT DISORDER WITH ANXIOUS MOOD: Primary | ICD-10-CM

## 2024-01-15 PROCEDURE — 90791 PSYCH DIAGNOSTIC EVALUATION: CPT | Mod: 95 | Performed by: SOCIAL WORKER

## 2024-01-16 DIAGNOSIS — R00.1 SINUS BRADYCARDIA: Primary | ICD-10-CM

## 2024-01-17 NOTE — PROGRESS NOTES
"    [facility]    PATIENT'S NAME: Nasreen Galarza  PREFERRED NAME: Nasreen  PRONOUNS:     She, her, hers  MRN: [id number]  : 1955  ADDRESS: [address] [place] [address]  [facility]  [id number]  DATE OF SERVICE: 1/15/2024  START TIME: 12:02  END TIME: 13:00  PREFERRED PHONE: [phone]  May we leave a program related message: Yes  EMERGENCY CONTACT: was obtained \" Dany Harmeet/son\" .  SERVICE MODALITY:  Video Visit:      Provider verified identity through the following two step process.  Patient provided:  Patient photo, Patient , and Patient address    Telemedicine Visit: The patient's condition can be safely assessed and treated via synchronous audio and visual telemedicine encounter.      Reason for Telemedicine Visit: Services only offered telehealth    Originating Site (Patient Location): Patient's other \" office\"    Distant Site (Provider Location): Provider Remote Setting- Home Office    Consent:  The patient/guardian has verbally consented to: the potential risks and benefits of telemedicine (video visit) versus in person care; bill my insurance or make self-payment for services provided; and responsibility for payment of non-covered services.     Patient would like the video invitation sent by:  My Chart    Mode of Communication:  [facility]    Distant Location (Provider):  Off-site    As the provider I attest to compliance with applicable laws and regulations related to telemedicine.    UNIVERSAL ADULT Mental Health DIAGNOSTIC ASSESSMENT    Identifying Information:  Patient is a 68 year old,  individual.  Patient was referred for an assessment by referring provider , Noemi Mayer MD.  Patient attended the session alone.    Chief Complaint:   The reason for seeking services at this time is: \"Self-sabotage at work, don't do what I am supposed to do and I get fired, anxiety\" just started this one a month ago . The problem(s) began 98- "  for the second time in [place], lost my job. Longest time on job ever was 7 years.    Goal: Keep my job    Patient has attempted to resolve these concerns in the past through therapy. Last saw a therapist was in 2019, medications.    Social/Family History:  Patient reported she grew up in other [place].  she were raised by adopted parents  .  Never knew her biological parents. Was officially adopted at age 6.  Possibly bio mom passed in 1997. Parents were always together.  Both adopted parents( both moms) passed away in 1982 and 1995. Patient reported that her childhood was good overall.  Patient described her current relationships with family of origin as NONE..     The patient describes her cultural background as .  Cultural influences and impact on patient's life structure, values, norms, and healthcare: Raised [place], now [place]. Grew up with 2 moms in the 60s! Always lived in urban environments..  Contextual influences on patient's health include: Contextual Factors: Individual Factors : Anxiety related to work and Family Factors : Her upbringing/family of origin.These factors will be addressed in the Preliminary Treatment plan. Patient identified her preferred language to be English. Patient reported she does not need the assistance of an  or other support involved in therapy.     Patient reported had no significant delays in developmental tasks.   Patient's highest education level was some college  .  Patient identified the following learning problems: none reported.  Modifications will not be used to assist communication in therapy.  Patient reports she is  able to understand written materials.    Patient reported the following relationship history: 2 (was  twice.)  Patient's current relationship status is  from second  for 25 years.   Patient identified her sexual orientation as heterosexual.  Patient reported having 1 child) son-40) from 1st  marriage. He lives in [place]. In touch, no issue . Patient identified adult child; friends as part of her support system.  Patient identified the quality of these relationships as stable and meaningful,  .      Patient's current living/housing situation involves staying in own home/apartment.  The immediate members of family and household include  Self and her 2 cats and she report that housing is stable.    Patient is currently employed fulltime.  Patient reports her finances are obtained through employment; other. Patient does identify finances as a current stressor.      Patient reported that she have not been involved with the legal system.    . Patient does not report being under probation/ parole/ jurisdiction. They are not under any current court jurisdiction. .    Patient's Strengths and Limitations:  Patient identified the following strengths or resources that will help them succeed in treatment: commitment to health and well being, friends / good social support, family support, insight, intelligence, positive work environment, sense of humor, strong social skills, and work ethic. Things that may interfere with the patient's success in treatment include: physical health concerns.     Assessments:  The following assessments were completed by patient for this visit:  PHQ2:       4/3/2023     1:17 PM 12/7/2022     2:32 PM 10/21/2022     9:32 AM 3/27/2022     3:12 PM 3/27/2022     3:09 PM 11/19/2021     8:34 AM 10/17/2021     5:49 PM   PHQ-2 ( 1999 [employer])   Q1: Little interest or pleasure in doing things 0 0 0 1 1  2   Q2: Feeling down, depressed or hopeless 0 1 0 1 1  2   PHQ-2 Score 0 1 0 2 2  4   PHQ-2 Total Score (12-17 Years)- Positive if 3 or more points; Administer PHQ-A if positive       4   Q1: Little interest or pleasure in doing things  Not at all  Several days Several days  More than half the days   Q2: Feeling down, depressed or hopeless  Several days  Several days Several days  More than  half the days   PHQ-2 Score  1  2 2 Incomplete    Incomplete 4     PHQ9:       1/11/2019     2:41 PM 8/31/2020     2:13 PM 8/23/2021     9:12 AM 10/17/2021     5:49 PM 11/19/2021     8:34 AM 12/7/2022     2:29 PM 12/4/2023     3:51 PM   PHQ-9 SCORE   PHQ-9 Total Score MyChart 21 (Severe depression)  8 (Mild depression) 12 (Moderate depression) 14 (Moderate depression) 3 (Minimal depression) 9 (Mild depression)   PHQ-9 Total Score 21 11 8 12 14 3 9     GAD2:       12/1/2023    11:23 AM   LYNETTE-2   Feeling nervous, anxious, or on edge 0    0   Not being able to stop or control worrying 0    0   LYNETTE-2 Total Score 0    0     GAD7:       3/19/2018     3:13 PM 3/23/2018     1:09 PM 4/3/2018     1:41 PM 1/11/2019     2:41 PM 8/23/2021     9:12 AM 12/7/2022     2:29 PM 12/4/2023     4:45 PM   LYNETTE-7 SCORE   Total Score 1 (minimal anxiety)  2 (minimal anxiety) 19 (severe anxiety) 1 (minimal anxiety) 0 (minimal anxiety)    Total Score 1 1 2 19 1 0 1     CAGE-AID:       12/1/2023    11:25 AM   CAGE-AID Total Score   Total Score 0    0   Total Score MyChart 0 (A total score of 2 or greater is considered clinically significant)     PROMIS 10-Global Health (all questions and answers displayed):       12/1/2023    11:24 AM   PROMIS 10   In general, would you say your health is: Good   In general, would you say your quality of life is: Excellent   In general, how would you rate your physical health? Good   In general, how would you rate your mental health, including your mood and your ability to think? Good   In general, how would you rate your satisfaction with your social activities and relationships? Good   In general, please rate how well you carry out your usual social activities and roles Good   To what extent are you able to carry out your everyday physical activities such as walking, climbing stairs, carrying groceries, or moving a chair? Mostly   In the past 7 days, how often have you been bothered by emotional problems such  as feeling anxious, depressed, or irritable? Rarely   In the past 7 days, how would you rate your fatigue on average? Moderate   In the past 7 days, how would you rate your pain on average, where 0 means no pain, and 10 means worst imaginable pain? 4   In general, would you say your health is: 3    3   In general, would you say your quality of life is: 5    5   In general, how would you rate your physical health? 3    3   In general, how would you rate your mental health, including your mood and your ability to think? 3    3   In general, how would you rate your satisfaction with your social activities and relationships? 3    3   In general, please rate how well you carry out your usual social activities and roles. (This includes activities at home, at work and in your community, and responsibilities as a parent, child, spouse, employee, friend, etc.) 3    3   To what extent are you able to carry out your everyday physical activities such as walking, climbing stairs, carrying groceries, or moving a chair? 4    4   In the past 7 days, how often have you been bothered by emotional problems such as feeling anxious, depressed, or irritable? 2    2   In the past 7 days, how would you rate your fatigue on average? 3    3   In the past 7 days, how would you rate your pain on average, where 0 means no pain, and 10 means worst imaginable pain? 4    4   Global Mental Health Score 15    15   Global Physical Health Score 13    13   PROMIS TOTAL - SUBSCORES 28    28     [place] Suicide Severity Rating Scale (Short Version)      10/6/2022    11:49 AM 10/20/2022    11:44 AM 12/4/2023     4:27 PM   [place] Suicide Severity Rating (Short Version)   Over the past 2 weeks have you felt down, depressed, or hopeless? no no    Over the past 2 weeks have you had thoughts of killing yourself? no no    Have you ever attempted to kill yourself? no no    1. Wish to be Dead (Since Last Contact)   N   2. Non-Specific Active Suicidal Thoughts  (Since Last Contact)   N   Actual Attempt (Since Last Contact)   N   Has subject engaged in non-suicidal self-injurious behavior? (Since Last Contact)   N   Interrupted Attempts (Since Last Contact)   N   Aborted or Self-Interrupted Attempt (Since Last Contact)   N   Preparatory Acts or Behavior (Since Last Contact)   N   Suicide (Since Last Contact)   N   Calculated C-SSRS Risk Score (Since Last Contact)   No Risk Indicated     Personal and Family Medical History:  Patient does  not report a family history of mental health concerns.  Patient reports family history is not on file. She was adopted..     Patient does report Mental Health Diagnosis and/or Treatment.  Patient Patient reported the following previous diagnoses which include(s): an Anxiety Disorder and Depression.  Patient reported symptoms began 1995 after losing her  second adoptive mom.  Patient has received mental health services in the past: therapy with providers in the community while living [place], [place] and [place] ; last seen in therapy was about 20 years and primary care provider at [facility]..  Psychiatric Hospitalizations: None.  Patient denies a history of civil commitment.  Patient is not receiving other mental health services.     Patient has had a physical exam to rule out medical causes for current symptoms.  Date of last physical exam was within the past year. Symptoms have developed since last physical exam and client was encouraged to follow up with PCP.  . The patient has a [facility] Provider, who is named Noemi Mayer..  Patient reports the following current medical concerns: See list below and no current dental concerns.  Patient reports pain concerns including hip pain.  Patient does want help addressing pain concerns. Also being seen for pain. There are not significant appetite / nutritional concerns / weight changes.  Has lost 50 lbs and regain 15 lbs. She is working on losing it again., through diet.   Patient does not report a history of head injury / trauma / cognitive impairment.        Current Outpatient Medications:     atorvastatin (LIPITOR) 80 MG tablet, Take 1 tablet (80 mg) by mouth daily, Disp: 90 tablet, Rfl: 0    augmented betamethasone dipropionate (DIPROLENE-AF) 0.05 % external ointment, Apply topically 2 times daily, Disp: 50 g, Rfl: 3    Fesoterodine Fumarate (TOVIAZ) 8 MG TB24, Take 1 tablet (8 mg) by mouth daily ([email or web address]), Disp: 90 tablet, Rfl: 3    levothyroxine (SYNTHROID/LEVOTHROID) 150 MCG tablet, Take 1 tablet (150 mcg) by mouth daily, Disp: 90 tablet, Rfl: 0    magnesium 500 MG TABS, Take 500 mg by mouth daily, Disp: , Rfl:     Multiple Vitamins-Minerals (MULTI COMPLETE PO), , Disp: , Rfl:     Omega-3 Fatty Acids (FISH OIL) 1200 MG capsule, Take 2,400 mg by mouth daily, Disp: , Rfl:     PARoxetine (PAXIL) 20 MG tablet, Take 1 tablet (20 mg) by mouth every morning, Disp: 90 tablet, Rfl: 0    tretinoin (RETIN-A) 0.05 % external cream, Apply topically At Bedtime, Disp: , Rfl:     valACYclovir (VALTREX) 500 MG tablet, Take 1 tablet (500 mg) by mouth 2 times daily 3 days only., Disp: 6 tablet, Rfl: 4    Vitamin D, Cholecalciferol, 25 MCG (1000 UT) CAPS, , Disp: , Rfl:      Medication Adherence:  Patient reports taking.  taking prescribed medications as prescribed.    Patient Allergies:    Allergies   Allergen Reactions    Penicillins Anaphylaxis    Sulfa Antibiotics Rash       Medical History:    Past Medical History:   Diagnosis Date    Depressive disorder 1996    Hypercholesterolemia     Migraines     STD (sexually transmitted disease) 1978    Herpes    Thyroid disease      Current Mental Status Exam:   Appearance:  Appropriate    Eye Contact:  Good   Psychomotor:  Normal       Gait / station:  no problem  Attitude / Demeanor: Cooperative   Speech      Rate / Production: Normal/ Responsive      Volume:  Normal  volume      Language:  intact  Mood:   Normal  Affect:   Appropriate     Thought Content: Clear   Thought Process: Coherent       Associations: No loosening of associations  Insight:   Good   Judgment:  Intact   Orientation:  Person Place Time Situation  Attention/concentration: Good    Substance Use:   Patient did not report a family history of substance use concerns; see medical history section for details.  Patient has not received chemical dependency treatment in the past.  Patient has not ever been to detox.      Patient is not currently receiving any chemical dependency treatment.       Substance History of use Age of first use Date of last use     Pattern and duration of use (include amounts and frequency)   Alcohol currently use   18 11/24/23  Up to 2 drinks. occasionally   Cannabis   currently use 22 11/01/23 Edibles Up to 5 mg of THC for sleep         Amphetamines   never used     REPORTS SUBSTANCE USE: N/A   Cocaine/crack    never used       REPORTS SUBSTANCE USE: N/A   Hallucinogens never used         REPORTS SUBSTANCE USE: N/A   Inhalants never used         REPORTS SUBSTANCE USE: N/A   Heroin never used         REPORTS SUBSTANCE USE: N/A   Other Opiates never used     REPORTS SUBSTANCE USE: N/A   Benzodiazepine   never used     REPORTS SUBSTANCE USE: N/A   Barbiturates never used     REPORTS SUBSTANCE USE: N/A   Over the counter meds never used     REPORTS SUBSTANCE USE: N/A   Caffeine currently use 10   REPORTS SUBSTANCE USE: N/A   Nicotine  used in the past 15 10/20/89 2 packs/day.     Other substances not listed above:  Identify:  never used     REPORTS SUBSTANCE USE: N/A     Patient reported the following problems as a result of her substance use: no problems, not applicable.    Substance Use: No symptoms    Based on the negative CAGE score and clinical interview there  are not indications of drug or alcohol abuse.    Significant Losses / Trauma / Abuse / Neglect Issues:   Patient did not serve in the .  There are indications or report of significant loss,  trauma, abuse or neglect issues related to: are no indications and client denies any losses, trauma, abuse, or neglect concerns.  Concerns for possible neglect are not present.     Safety Assessment:   Patient denies current homicidal ideation and behaviors.  Patient denies current self-injurious ideation and behaviors.    Patient denied risk behaviors associated with substance use.   Patient denies any high risk behaviors associated with mental health symptoms.  Patient reports the following current concerns for her personal safety: None.  Patient reports there are not firearms in the house.          History of Safety Concerns:  Patient denied a history of homicidal ideation.     Patient denied a history of personal safety concerns.    Patient denied a history of assaultive behaviors.    Patient denied a history of sexual assault behaviors.     Patient denied a history of risk behaviors associated with substance use.  Patient denies any history of high risk behaviors associated with mental health symptoms.  Patient reports the following protective factors: forward or future oriented thinking; dedication to family or friends; purpose; financial stability    Risk Plan:  See Recommendations for Safety and Risk Management Plan    Review of Symptoms per patient report:   Depression: Change in sleep, Lack of interest, Change in energy level, Change in appetite, and Feeling sad, down, or depressed  Bree:  No Symptoms  Psychosis: No Symptoms  Anxiety: No Symptoms  Panic:  No symptoms  Post Traumatic Stress Disorder:  No Symptoms   Eating Disorder: No Symptoms  ADD / ADHD:  No symptoms  Conduct Disorder: No symptoms  Autism Spectrum Disorder: No symptoms  Obsessive Compulsive Disorder: No Symptoms    Patient reports the following compulsive behaviors and treatment history: none reported.      Diagnostic Criteria:   Adjustment Disorder  A. The development of emotional or behavioral symptoms in response to an identifiable  stressor(s) occurring within 3 months of the onset of the stressor(s)  B. These symptoms or behaviors are clinically significant, as evidenced by one or both of the following:       - Marked distress that is out of proportion to the severity/intensity of the stressor (with consideration for external context & culture)       - Significant impairment in social, occupational, or other important areas of functioning  C. The stress-related disturbance does not meet criteria for another disorder & is not not an exacerbation of another mental disorder  D. The symptoms do not represent normal bereavement  E. Once the stressor or its consequences have terminated, the symptoms do not persist for more than an additional 6 months       * Adjustment Disorder with Anxiety: The predominant manfestations are symptoms such as nervousness, worry, or jitteriness, or, in children separation anxiety from major attachment figures    Functional Status:  Patient reports the following functional impairments:  health maintenance, management of the household and or completion of tasks, organization, and work / vocational responsibilities.     Nonprogrammatic care:  Patient is requesting basic services to address current mental health concerns.    Clinical Summary:  1. Psychosocial, Cultural and Contextual Factors: Anxiety  .  2. Principal DSM5 Diagnoses  (Sustained by DSM5 Criteria Listed Above):   Adjustment Disorders  309.24 (F43.22) With anxiety.  3. Other Diagnoses that is relevant to services:   300.02 (F41.1) Generalized Anxiety Disorder.  4. Provisional Diagnosis:  Adjustment Disorders  309.24 (F43.22) With anxiety as evidenced by clinical inventories. Chart review, mental status and clinical interview.    5. Prognosis: Expect Improvement.  6. Likely consequences of symptoms if not treated: symptoms would get worse .  7. Client strengths include:  caring, educated, empathetic, employed, good listener, has a previous history of therapy,  intelligent, open to learning, open to suggestions / feedback, responsible parent, support of family, friends and providers, supportive, willing to ask questions, and work history .     Recommendations:     1. Plan for Safety and Risk Management:   Safety and Risk: Recommended that patient call 911 or go to the local ED should there be a change in any of these risk factors. Also also call the National suicide prevention: 988       Report to child / adult protection services was NA.     2. Patient's identified mental health concerns with a cultural influence will be addressed by patient.     3. Initial Treatment will focus on:   Anxiety - challenge any cognitive traps as identified. .     4. Resources/Service Plan:    services are not indicated.   Modifications to assist communication are not indicated.   Additional disability accommodations are not indicated.      5. Collaboration:   Collaboration / coordination of treatment will be initiated with the following  support professionals: primary care physician.      6.  Referrals:   The following referral(s) will be initiated: none needed today.       A Release of Information has been obtained for the following: No ALEX for  Lana, care team.     Clinical Substantiation/medical necessity for the above recommendations:  This diagnostic assessment was completed by this writer using chart review, clinical interview, mental status and clinical inventories.  Patient presented with a specific concern that she called  tendency to sabotage her employment where she loses her focus and get fired. Her expectation in therapy is to learn how to keep her job. Patient has a history of anxiety. Reports no major concern except as she puts in, has some trouble with sleep. Has been using cannabis to sleep. Patient identified her protective factors. Denies SI/SIB/HI/HIRA, Pt presents with forward thinking and willingness to make changes in her life. Modalities including CBT,  ACT, MI and Person Centered might be used to meet her presenting needs.     7. HIRA:    HIRA:  Discussed the general effects of drugs and alcohol on health and well-being. Recommendations: should report any change related to alcohol and other drug use .     8. Records:   These were reviewed at time of assessment.   Information in this assessment was obtained from the medical record and  provided by patient who is a good historian. Patient will have open access to their mental health medical record.    9.   Interactive Complexity: No    10. Safety Plan:  Patient denied any current/recent/lifetime history of suicidal ideation and/or behaviors.  No safety plan indicated at this time.     Provider Name/ Credentials:  Performed and documented by LÁZARO Mcmullen- KIANNA; [facility] 1/15/2024

## 2024-01-31 DIAGNOSIS — E03.9 HYPOTHYROIDISM, UNSPECIFIED TYPE: ICD-10-CM

## 2024-01-31 DIAGNOSIS — E78.5 HYPERLIPIDEMIA LDL GOAL <100: ICD-10-CM

## 2024-01-31 DIAGNOSIS — N95.1 MENOPAUSAL SYNDROME (HOT FLASHES): ICD-10-CM

## 2024-01-31 RX ORDER — LEVOTHYROXINE SODIUM 150 UG/1
150 TABLET ORAL DAILY
Qty: 90 TABLET | Refills: 1 | Status: SHIPPED | OUTPATIENT
Start: 2024-01-31 | End: 2024-07-15

## 2024-01-31 RX ORDER — PAROXETINE 20 MG/1
20 TABLET, FILM COATED ORAL EVERY MORNING
Qty: 90 TABLET | Refills: 1 | Status: SHIPPED | OUTPATIENT
Start: 2024-01-31 | End: 2024-06-27

## 2024-01-31 RX ORDER — ATORVASTATIN CALCIUM 80 MG/1
80 TABLET, FILM COATED ORAL DAILY
Qty: 90 TABLET | Refills: 1 | Status: SHIPPED | OUTPATIENT
Start: 2024-01-31 | End: 2024-05-06

## 2024-02-03 ENCOUNTER — TRANSFERRED RECORDS (OUTPATIENT)
Dept: HEALTH INFORMATION MANAGEMENT | Facility: CLINIC | Age: 69
End: 2024-02-03
Payer: COMMERCIAL

## 2024-02-12 ENCOUNTER — VIRTUAL VISIT (OUTPATIENT)
Dept: PSYCHOLOGY | Facility: CLINIC | Age: 69
End: 2024-02-12
Payer: COMMERCIAL

## 2024-02-12 DIAGNOSIS — F41.1 GENERALIZED ANXIETY DISORDER: Primary | ICD-10-CM

## 2024-02-12 DIAGNOSIS — F43.23 ADJUSTMENT DISORDER WITH MIXED ANXIETY AND DEPRESSED MOOD: ICD-10-CM

## 2024-02-12 PROCEDURE — 90837 PSYTX W PT 60 MINUTES: CPT | Mod: 95 | Performed by: SOCIAL WORKER

## 2024-02-12 ASSESSMENT — ANXIETY QUESTIONNAIRES
5. BEING SO RESTLESS THAT IT IS HARD TO SIT STILL: NOT AT ALL
1. FEELING NERVOUS, ANXIOUS, OR ON EDGE: SEVERAL DAYS
2. NOT BEING ABLE TO STOP OR CONTROL WORRYING: SEVERAL DAYS
GAD7 TOTAL SCORE: 2
7. FEELING AFRAID AS IF SOMETHING AWFUL MIGHT HAPPEN: NOT AT ALL
3. WORRYING TOO MUCH ABOUT DIFFERENT THINGS: NOT AT ALL
IF YOU CHECKED OFF ANY PROBLEMS ON THIS QUESTIONNAIRE, HOW DIFFICULT HAVE THESE PROBLEMS MADE IT FOR YOU TO DO YOUR WORK, TAKE CARE OF THINGS AT HOME, OR GET ALONG WITH OTHER PEOPLE: NOT DIFFICULT AT ALL
6. BECOMING EASILY ANNOYED OR IRRITABLE: NOT AT ALL
GAD7 TOTAL SCORE: 2

## 2024-02-12 ASSESSMENT — PATIENT HEALTH QUESTIONNAIRE - PHQ9: 5. POOR APPETITE OR OVEREATING: NOT AT ALL

## 2024-02-12 ASSESSMENT — COLUMBIA-SUICIDE SEVERITY RATING SCALE - C-SSRS
TOTAL  NUMBER OF ABORTED OR SELF INTERRUPTED ATTEMPTS LIFETIME: NO
ATTEMPT LIFETIME: NO
1. HAVE YOU WISHED YOU WERE DEAD OR WISHED YOU COULD GO TO SLEEP AND NOT WAKE UP?: NO
2. HAVE YOU ACTUALLY HAD ANY THOUGHTS OF KILLING YOURSELF?: NO
TOTAL  NUMBER OF INTERRUPTED ATTEMPTS LIFETIME: NO
6. HAVE YOU EVER DONE ANYTHING, STARTED TO DO ANYTHING, OR PREPARED TO DO ANYTHING TO END YOUR LIFE?: NO

## 2024-02-12 NOTE — PROGRESS NOTES
[facility]                                     Progress Note    Patient Name: Nasreen Galarza  Date: 2/12/2024         Service Type: Individual      Session Start Time: 13:03  Session End Time: 13:56     Session Length: 53    Session #: 1    Attendees: Client attended alone    Service Modality:  Video Visit:      Provider verified identity through the following two step process.  Patient provided:  Patient is known previously to provider    Telemedicine Visit: The patient's condition can be safely assessed and treated via synchronous audio and visual telemedicine encounter.      Reason for Telemedicine Visit: Services only offered telehealth    Originating Site (Patient Location): Patient's home    Distant Site (Provider Location): Provider Remote Setting- Home Office    Consent:  The patient/guardian has verbally consented to: the potential risks and benefits of telemedicine (video visit) versus in person care; bill my insurance or make self-payment for services provided; and responsibility for payment of non-covered services.     Patient would like the video invitation sent by:  My Chart    Mode of Communication:  Video Conference via [facility]well    Distant Location (Provider):  Off-site    As the provider I attest to compliance with applicable laws and regulations related to telemedicine.    DATA  Interactive Complexity: Yes, visit entailed Interactive Complexity evidenced by: 1st visit following DA. Results were presented and TP was discussed    Crisis: No        Progress Since Last Session (Related to Symptoms / Goals / Homework):   Symptoms: Improving : less anxiety    Homework: Achieved / completed to satisfaction      Episode of Care Goals: Satisfactory progress - PREPARATION (Decided to change - considering how); Intervened by negotiating a change plan and determining options / strategies for behavior change, identifying triggers, exploring social supports, and working towards setting a date  to begin behavior change     Current / Ongoing Stressors and Concerns: Patient continues to make every effort to keep her job. Dicussed the results from her DA. Discussed option to make her daily functions easier so she increases energy to go her job. Patient go hurt on her leg so will be focusing on getting better and her goal will take into consideration her over all health. No safety issue. She is actually doing well identifying her deficits and participant and making things easier for her. She will be reflecting on today's discussion and start to develop and SMART goal  to make her day easier. Her next visit is in 2 weeks.      Treatment Objective(s) Addressed in This Session:   identify at least 3 stressors which contribute to feelings of anxiety  use thought-stopping strategy daily to reduce intrusive thoughts  Developing a TP     Intervention:   DA results. Questions and answers. TP discussed. HW discussed.     Assessments completed prior to visit:    The following assessments were completed by patient for this visit:  PHQ2:       1/4/2024     9:00 AM 4/3/2023     1:17 PM 12/7/2022     2:32 PM 10/21/2022     9:32 AM 3/27/2022     3:12 PM 3/27/2022     3:09 PM 11/19/2021     8:34 AM   PHQ-2 ( 1999 [employer])   Q1: Little interest or pleasure in doing things 1 0 0 0 1 1    Q2: Feeling down, depressed or hopeless 1 0 1 0 1 1    PHQ-2 Score 2 0 1 0 2 2    Q1: Little interest or pleasure in doing things Several days  Not at all  Several days Several days    Q2: Feeling down, depressed or hopeless Several days  Several days  Several days Several days    PHQ-2 Score 2  1  2 2 Incomplete    Incomplete     PHQ9:       8/31/2020     2:13 PM 8/23/2021     9:12 AM 10/17/2021     5:49 PM 11/19/2021     8:34 AM 12/7/2022     2:29 PM 12/4/2023     3:51 PM 1/5/2024     9:10 AM   PHQ-9 SCORE   [facility]9 [facility]  8 (Mild depression) 12 (Moderate depression) 14 (Moderate depression) 3 (Minimal depression) 9 (Mild  depression) 9 (Mild depression)   PHQ-9 Total Score 11 8 12 14 3 9 9     GAD2:       12/1/2023    11:23 AM 12/11/2023    10:02 AM   LYNETTE-2   Feeling nervous, anxious, or on edge 0 0   Not being able to stop or control worrying 0 0   LYNETTE-2 Total Score 0    0 0    0     GAD7:       3/23/2018     1:09 PM 4/3/2018     1:41 PM 1/11/2019     2:41 PM 8/23/2021     9:12 AM 12/7/2022     2:29 PM 12/4/2023     4:45 PM 2/12/2024     4:57 PM   LYNETTE-7 SCORE   Total Score  2 (minimal anxiety) 19 (severe anxiety) 1 (minimal anxiety) 0 (minimal anxiety)     Total Score 1 2 19 1 0 1 2     CAGE-AID:       12/1/2023    11:25 AM   CAGE-AID Total Score   Total Score 0    0   Total Score MyChart 0 (A total score of 2 or greater is considered clinically significant)     PROMIS 10-Global Health (all questions and answers displayed):       12/1/2023    11:24 AM 12/11/2023    10:02 AM   PROMIS 10   In general, would you say your health is: Good Good   In general, would you say your quality of life is: Excellent Very good   In general, how would you rate your physical health? Good Very good   In general, how would you rate your mental health, including your mood and your ability to think? Good Good   In general, how would you rate your satisfaction with your social activities and relationships? Good Good   In general, please rate how well you carry out your usual social activities and roles Good Very good   To what extent are you able to carry out your everyday physical activities such as walking, climbing stairs, carrying groceries, or moving a chair? Mostly Mostly   In the past 7 days, how often have you been bothered by emotional problems such as feeling anxious, depressed, or irritable? Rarely Rarely   In the past 7 days, how would you rate your fatigue on average? Moderate Moderate   In the past 7 days, how would you rate your pain on average, where 0 means no pain, and 10 means worst imaginable pain? 4 6   In general, would you say your  health is: 3 3   In general, would you say your quality of life is: 5 4   In general, how would you rate your physical health? 3 4   In general, how would you rate your mental health, including your mood and your ability to think? 3 3   In general, how would you rate your satisfaction with your social activities and relationships? 3 3   In general, please rate how well you carry out your usual social activities and roles. (This includes activities at home, at work and in your community, and responsibilities as a parent, child, spouse, employee, friend, etc.) 3 4   To what extent are you able to carry out your everyday physical activities such as walking, climbing stairs, carrying groceries, or moving a chair? 4 4   In the past 7 days, how often have you been bothered by emotional problems such as feeling anxious, depressed, or irritable? 2 2   In the past 7 days, how would you rate your fatigue on average? 3 3   In the past 7 days, how would you rate your pain on average, where 0 means no pain, and 10 means worst imaginable pain? 4 6   Global Mental Health Score 15    15 14    14   Global Physical Health Score 13    13 14    14   PROMIS TOTAL - SUBSCORES 28    28 28    28     [place] Suicide Severity Rating Scale (Lifetime/Recent)      2/12/2024     5:00 PM   [place] Suicide Severity Rating (Lifetime/Recent)   Q1 Wish to be Dead (Lifetime) N   Q2 Non-Specific Active Suicidal Thoughts (Lifetime) N   Actual Attempt (Lifetime) N   Has subject engaged in non-suicidal self-injurious behavior? (Lifetime) N   Interrupted Attempts (Lifetime) N   Aborted or Self-Interrupted Attempt (Lifetime) N   Preparatory Acts or Behavior (Lifetime) N   Calculated C-SSRS Risk Score (Lifetime/Recent) No Risk Indicated     [place] Suicide Severity Rating Scale (Short Version)      10/6/2022    11:49 AM 10/20/2022    11:44 AM 12/4/2023     4:27 PM   [place] Suicide Severity Rating (Short Version)   Over the past 2 weeks have you felt  down, depressed, or hopeless? no no    Over the past 2 weeks have you had thoughts of killing yourself? no no    Have you ever attempted to kill yourself? no no    1. Wish to be Dead (Since Last Contact)   N   2. Non-Specific Active Suicidal Thoughts (Since Last Contact)   N   Actual Attempt (Since Last Contact)   N   Has subject engaged in non-suicidal self-injurious behavior? (Since Last Contact)   N   Interrupted Attempts (Since Last Contact)   N   Aborted or Self-Interrupted Attempt (Since Last Contact)   N   Preparatory Acts or Behavior (Since Last Contact)   N   Suicide (Since Last Contact)   N   Calculated C-SSRS Risk Score (Since Last Contact)   No Risk Indicated      ASSESSMENT: Current Emotional / Mental Status (status of significant symptoms):   Risk status (Self / Other harm or suicidal ideation)   Patient denies current fears or concerns for personal safety.   Patient denies current or recent suicidal ideation or behaviors.   Patient denies current or recent homicidal ideation or behaviors.   Patient denies current or recent self injurious behavior or ideation.   Patient denies other safety concerns.   Patient reports there has been no change in risk factors since their last session.     Patient reports there has been no change in protective factors since their last session.     Recommended that patient call 911 or go to the local ED should there be a change in any of these risk factors.     Appearance:   Appropriate    Eye Contact:   Good    Psychomotor Behavior: Normal    Attitude:   Cooperative    Orientation:   Person Place Time Situation   Speech    Rate / Production: Normal/ Responsive Normal     Volume:  Normal    Mood:    Normal   Affect:    Appropriate    Thought Content:  Clear    Thought Form:  Coherent  Logical    Insight:    Good      Medication Review:   No changes to current psychiatric medication(s)     Medication Compliance:   Yes     Changes in Health Issues:   None  reported     Chemical Use Review:   Substance Use: Chemical use reviewed, no active concerns identified      Tobacco Use: No current tobacco use.      Diagnosis:  1. Generalized anxiety disorder    2. Adjustment disorder with mixed anxiety and depressed mood      Collateral Reports Completed:   Not Applicable    PLAN: (Patient Tasks / Therapist Tasks / Other)  Patient will reflect on today's discussion on her priorities to keep her job.   Patient will start developing a SMART goal  related to her daily life  Patient's next visit is in 2 weeks.     Mecca Ramos, LICSW  ____________________________________________________________________  Individual Treatment Plan    Patient's Name: Nasreen Galarza  YOB: 1955    Date of Creation: 2/12/2024    Date Treatment Plan Last Reviewed/Revised: 2/12/2024    DSM5 Diagnoses: 300.02 (F41.1) Generalized Anxiety Disorder or Adjustment Disorders  309.28 (F43.23) With mixed anxiety and depressed mood    Psychosocial / Contextual Factors: Anxiety related to job maintenance.     PROMIS (reviewed every 90 days): 28    Referral / Collaboration:  Referral to another professional/service is not indicated at this time..    Anticipated number of session for this episode of care: 9-12 sessions  Anticipation frequency of session: Biweekly  Anticipated Duration of each session: 38-52 minutes  Treatment plan will be reviewed in 90 days or when goals have been changed.     MeasurableTreatment Goal(s) related to diagnosis / functional impairment(s)  Goal 1: Patient will Stabilize anxiety level while increasing ability to function on a daily basis at least by the next review( 90 days)    I will know I've met my goal when I am able to control my anxiety at least at 80 % of the time by next review ( 90 days).      Objective #A (Patient Action)    Patient will use thought-stopping strategy daily to reduce intrusive thoughts.  Status: New - Date: 2/12/2024       Intervention(s)  Therapist will teach  teach the CBT skills to challenge any cognitive traps related to her daily functions .    Goal 2: Patient will alleviate depressed mood and return to her the previous level of effective functioning.    I will know I've met my goal when I am able to cope with depressed mood at least at 80 % of the time by the next review ( 90 days).      Objective #A (Patient Action)    Status: New - Date: 2/12/2024      Patient will Decrease frequency and intensity of feeling down, depressed, hopeless.    Intervention(s)  Therapist will assign homework and teach grounding techniques and other mindfulness exercises and reinforce them.    Patient has reviewed and agreed to the above plan.      Mecca Ramos, KIANNA  February 12, 2024

## 2024-02-20 ENCOUNTER — TRANSFERRED RECORDS (OUTPATIENT)
Dept: HEALTH INFORMATION MANAGEMENT | Facility: CLINIC | Age: 69
End: 2024-02-20
Payer: COMMERCIAL

## 2024-02-29 ENCOUNTER — TRANSFERRED RECORDS (OUTPATIENT)
Dept: HEALTH INFORMATION MANAGEMENT | Facility: CLINIC | Age: 69
End: 2024-02-29
Payer: COMMERCIAL

## 2024-03-29 ENCOUNTER — VIRTUAL VISIT (OUTPATIENT)
Dept: PSYCHOLOGY | Facility: CLINIC | Age: 69
End: 2024-03-29
Payer: COMMERCIAL

## 2024-03-29 DIAGNOSIS — F43.22 ADJUSTMENT DISORDER WITH ANXIOUS MOOD: Primary | ICD-10-CM

## 2024-03-29 PROCEDURE — 90834 PSYTX W PT 45 MINUTES: CPT | Mod: 95 | Performed by: SOCIAL WORKER

## 2024-03-29 ASSESSMENT — PATIENT HEALTH QUESTIONNAIRE - PHQ9: SUM OF ALL RESPONSES TO PHQ QUESTIONS 1-9: 7

## 2024-03-29 NOTE — PROGRESS NOTES
[facility]                                     Progress Note    Patient Name: Nasreen Galarza  Date: 3/29/2024         Service Type: Individual      Session Start Time: 13:02  Session End Time:13:54     Session Length:52    Session #:2    Attendees: Client attended alone    Service Modality:  Video Visit:      Provider verified identity through the following two step process.  Patient provided:  Patient is known previously to provider    Telemedicine Visit: The patient's condition can be safely assessed and treated via synchronous audio and visual telemedicine encounter.      Reason for Telemedicine Visit: Services only offered telehealth    Originating Site (Patient Location): Patient's home    Distant Site (Provider Location): [facility] [place]    Consent:  The patient/guardian has verbally consented to: the potential risks and benefits of telemedicine (video visit) versus in person care; bill my insurance or make self-payment for services provided; and responsibility for payment of non-covered services.     Patient would like the video invitation sent by:  My Chart    Mode of Communication:  Video Conference via [employer]    Distant Location (Provider):  On-site    As the provider I attest to compliance with applicable laws and regulations related to telemedicine.    DATA  Interactive Complexity: No     Crisis: No        Progress Since Last Session (Related to Symptoms / Goals / Homework):   Symptoms:  anxiety related to current physical pain    Homework: Achieved / completed to satisfaction      Episode of Care Goals: Satisfactory progress - ACTION (Actively working towards change); Intervened by reinforcing change plan / affirming steps taken- was doing well with her CBT skills but physical pain is limiting her daily activities.     Current / Ongoing Stressors and Concerns: Patient has been very busy with work. Just came from a conference out of  the state( [place]). No issue and she loves her job. She even got at least 100 points on her credit which she notes is big deal. Shared her experience with travel while physically limited.  Has been dealing with a pain in the hip for at least the last 13 years. Has an appt with a surgeon on 4/9. Will then know when to do the surgery. Patient and writer discussed grounding techniques that might help her distract her current pain. These were sent via [employer]. Also discussed how some thoughts we own are not true.  Patient will explore this on her own. Will practice the mirror to help understand her  strengths. Alana does well with self care. She will continue these healthy life styles. Completed the safety plans as required. No history of SI.  Her next visit is in 2 weeks.     Treatment Objective(s) Addressed in This Session:   identify at least 3 strategies to more effectively address stressors     Intervention:   Grounding techniques to distract from pain :  see 5; touch 4; hear 3; smell 2, taste 1. Provided the tool via [employer]    Assessments completed prior to visit:    The following assessments were completed by patient for this visit:  PHQ2:       3/11/2024     8:36 AM 1/4/2024     9:00 AM 4/3/2023     1:17 PM 12/7/2022     2:32 PM 10/21/2022     9:32 AM 3/27/2022     3:12 PM 3/27/2022     3:09 PM   PHQ-2 ( 1999 [employer])   Q1: Little interest or pleasure in doing things 0 1 0 0 0 1 1   Q2: Feeling down, depressed or hopeless 1 1 0 1 0 1 1   PHQ-2 Score 1 2 0 1 0 2 2   Q1: Little interest or pleasure in doing things Not at all Several days  Not at all  Several days Several days   Q2: Feeling down, depressed or hopeless Several days Several days  Several days  Several days Several days   PHQ-2 Score 1 2  1  2 2     PHQ9:       8/23/2021     9:12 AM 10/17/2021     5:49 PM 11/19/2021     8:34 AM 12/7/2022     2:29 PM 12/4/2023     3:51 PM 1/5/2024     9:10 AM 3/29/2024     1:09 PM   PHQ-9 SCORE   PHQ-9 Total Score  MyChart 8 (Mild depression) 12 (Moderate depression) 14 (Moderate depression) 3 (Minimal depression) 9 (Mild depression) 9 (Mild depression)    PHQ-9 Total Score 8 12 14 3 9 9 7     GAD2:       12/1/2023    11:23 AM 12/11/2023    10:02 AM 3/11/2024     8:36 AM 3/22/2024     9:33 PM   LYNETTE-2   Feeling nervous, anxious, or on edge 0 0 0 1   Not being able to stop or control worrying 0 0 0 0   LYNETTE-2 Total Score 0    0 0    0 0 1     GAD7:       3/23/2018     1:09 PM 4/3/2018     1:41 PM 1/11/2019     2:41 PM 8/23/2021     9:12 AM 12/7/2022     2:29 PM 12/4/2023     4:45 PM 2/12/2024     4:57 PM   LYNETTE-7 SCORE   Total Score  2 (minimal anxiety) 19 (severe anxiety) 1 (minimal anxiety) 0 (minimal anxiety)     Total Score 1 2 19 1 0 1 2     CAGE-AID:       12/1/2023    11:25 AM   CAGE-AID Total Score   Total Score 0    0   Total Score MyChart 0 (A total score of 2 or greater is considered clinically significant)     PROMIS 10-Global Health (all questions and answers displayed):       12/1/2023    11:24 AM 12/11/2023    10:02 AM 3/11/2024     8:37 AM 3/22/2024     9:34 PM   PROMIS 10   In general, would you say your health is: Good Good Good Good   In general, would you say your quality of life is: Excellent Very good Very good Good   In general, how would you rate your physical health? Good Very good Good Fair   In general, how would you rate your mental health, including your mood and your ability to think? Good Good Very good Very good   In general, how would you rate your satisfaction with your social activities and relationships? Good Good Good Very good   In general, please rate how well you carry out your usual social activities and roles Good Very good Good Very good   To what extent are you able to carry out your everyday physical activities such as walking, climbing stairs, carrying groceries, or moving a chair? Mostly Mostly A little A little   In the past 7 days, how often have you been bothered by emotional  problems such as feeling anxious, depressed, or irritable? Rarely Rarely Rarely Rarely   In the past 7 days, how would you rate your fatigue on average? Moderate Moderate Mild Moderate   In the past 7 days, how would you rate your pain on average, where 0 means no pain, and 10 means worst imaginable pain? 4 6 6 7   In general, would you say your health is: 3 3 3 3   In general, would you say your quality of life is: 5 4 4 3   In general, how would you rate your physical health? 3 4 3 2   In general, how would you rate your mental health, including your mood and your ability to think? 3 3 4 4   In general, how would you rate your satisfaction with your social activities and relationships? 3 3 3 4   In general, please rate how well you carry out your usual social activities and roles. (This includes activities at home, at work and in your community, and responsibilities as a parent, child, spouse, employee, friend, etc.) 3 4 3 4   To what extent are you able to carry out your everyday physical activities such as walking, climbing stairs, carrying groceries, or moving a chair? 4 4 2 2   In the past 7 days, how often have you been bothered by emotional problems such as feeling anxious, depressed, or irritable? 2 2 2 2   In the past 7 days, how would you rate your fatigue on average? 3 3 2 3   In the past 7 days, how would you rate your pain on average, where 0 means no pain, and 10 means worst imaginable pain? 4 6 6 7   Global Mental Health Score 15    15 14    14 15 15   Global Physical Health Score 13    13 14    14 12 9   PROMIS TOTAL - SUBSCORES 28    28 28    28 27 24     [place] Suicide Severity Rating Scale (Lifetime/Recent)      2/12/2024     5:00 PM   [place] Suicide Severity Rating (Lifetime/Recent)   Q1 Wish to be Dead (Lifetime) N   Q2 Non-Specific Active Suicidal Thoughts (Lifetime) N   Actual Attempt (Lifetime) N   Has subject engaged in non-suicidal self-injurious behavior? (Lifetime) N   Interrupted  Attempts (Lifetime) N   Aborted or Self-Interrupted Attempt (Lifetime) N   Preparatory Acts or Behavior (Lifetime) N   Calculated C-SSRS Risk Score (Lifetime/Recent) No Risk Indicated     [place] Suicide Severity Rating Scale (Short Version)      10/6/2022    11:49 AM 10/20/2022    11:44 AM 12/4/2023     4:27 PM   [place] Suicide Severity Rating (Short Version)   Over the past 2 weeks have you felt down, depressed, or hopeless? no no    Over the past 2 weeks have you had thoughts of killing yourself? no no    Have you ever attempted to kill yourself? no no    1. Wish to be Dead (Since Last Contact)   N   2. Non-Specific Active Suicidal Thoughts (Since Last Contact)   N   Actual Attempt (Since Last Contact)   N   Has subject engaged in non-suicidal self-injurious behavior? (Since Last Contact)   N   Interrupted Attempts (Since Last Contact)   N   Aborted or Self-Interrupted Attempt (Since Last Contact)   N   Preparatory Acts or Behavior (Since Last Contact)   N   Suicide (Since Last Contact)   N   Calculated C-SSRS Risk Score (Since Last Contact)   No Risk Indicated      ASSESSMENT: Current Emotional / Mental Status (status of significant symptoms):   Risk status (Self / Other harm or suicidal ideation)   Patient denies current fears or concerns for personal safety.   Patient denies current or recent suicidal ideation or behaviors.   Patient denies current or recent homicidal ideation or behaviors.   Patient denies current or recent self injurious behavior or ideation.   Patient denies other safety concerns.   Patient reports there has been no change in risk factors since their last session.     Patient reports there has been no change in protective factors since their last session.     Recommended that patient call 911 or go to the local ED should there be a change in any of these risk factors.     Appearance:   Appropriate    Eye Contact:   Good    Psychomotor Behavior: Normal    Attitude:   Cooperative     Orientation:   Person Place Time Situation   Speech    Rate / Production: Normal/ Responsive    Volume:  Normal    Mood:    Normal   Affect:    Appropriate    Thought Content:  Clear    Thought Form:  Coherent  Logical    Insight:    Good      Medication Review:   No changes to current psychiatric medication(s)     Medication Compliance:   Yes     Changes in Health Issues:   None reported     Chemical Use Review:   Substance Use: Chemical use reviewed, no active concerns identified      Tobacco Use: No current tobacco use.      Diagnosis:  1. Adjustment disorder with anxious mood      Collateral Reports Completed:   Not Applicable    PLAN: (Patient Tasks / Therapist Tasks / Other)  Patient will practice her 5 senses to distract her mind from the pain  Patient's next visit is in 2 weeks  MAGGI McmullenSW  ____________________________________________________________________  Individual Treatment Plan    Patient's Name: Nasreen Galarza  YOB: 1955    Date of Creation: 2/12/2024    Date Treatment Plan Last Reviewed/Revised: 2/12/2024    DSM5 Diagnoses: 300.02 (F41.1) Generalized Anxiety Disorder or Adjustment Disorders  309.28 (F43.23) With mixed anxiety and depressed mood    Psychosocial / Contextual Factors: Anxiety related to job maintenance.     PROMIS (reviewed every 90 days): 28    Referral / Collaboration:  Referral to another professional/service is not indicated at this time..    Anticipated number of session for this episode of care: 9-12 sessions  Anticipation frequency of session: Biweekly  Anticipated Duration of each session: 38-52 minutes  Treatment plan will be reviewed in 90 days or when goals have been changed.     MeasurableTreatment Goal(s) related to diagnosis / functional impairment(s)  Goal 1: Patient will Stabilize anxiety level while increasing ability to function on a daily basis at least by the next review( 90 days)    I will know I've met my goal when I am able to  control my anxiety at least at 80 % of the time by next review ( 90 days).      Objective #A (Patient Action)    Patient will use thought-stopping strategy daily to reduce intrusive thoughts.  Status: New - Date: 2/12/2024      Intervention(s)  Therapist will teach  teach the CBT skills to challenge any cognitive traps related to her daily functions .    Goal 2: Patient will alleviate depressed mood and return to her the previous level of effective functioning.    I will know I've met my goal when I am able to cope with depressed mood at least at 80 % of the time by the next review ( 90 days).      Objective #A (Patient Action)    Status: New - Date: 2/12/2024      Patient will Decrease frequency and intensity of feeling down, depressed, hopeless.    Intervention(s)  Therapist will assign homework and teach grounding techniques and other mindfulness exercises and reinforce them.    Patient has reviewed and agreed to the above plan.      KIANNA Mcmullen  February 12, 2024

## 2024-04-05 ENCOUNTER — VIRTUAL VISIT (OUTPATIENT)
Dept: PSYCHOLOGY | Facility: CLINIC | Age: 69
End: 2024-04-05
Payer: COMMERCIAL

## 2024-04-05 DIAGNOSIS — F43.22 ADJUSTMENT DISORDER WITH ANXIOUS MOOD: Primary | ICD-10-CM

## 2024-04-05 PROCEDURE — 90832 PSYTX W PT 30 MINUTES: CPT | Mod: 95 | Performed by: SOCIAL WORKER

## 2024-04-05 NOTE — PROGRESS NOTES
[facility]                                     Progress Note    Patient Name: Nasreen Galarza  Date: 4/05/2024         Service Type: Individual      Session Start Time: 8:02 Session End Time:8:34     Session Length:32    Session #:3    Attendees: Client attended alone    Service Modality:  Video Visit:      Provider verified identity through the following two step process.  Patient provided:  Patient is known previously to provider    Telemedicine Visit: The patient's condition can be safely assessed and treated via synchronous audio and visual telemedicine encounter.      Reason for Telemedicine Visit: Services only offered telehealth    Originating Site (Patient Location): Patient's home    Distant Site (Provider Location): [facility] [place]    Consent:  The patient/guardian has verbally consented to: the potential risks and benefits of telemedicine (video visit) versus in person care; bill my insurance or make self-payment for services provided; and responsibility for payment of non-covered services.     Patient would like the video invitation sent by:  My Chart    Mode of Communication:  Video Conference via [employer]well    Distant Location (Provider):  On-site    As the provider I attest to compliance with applicable laws and regulations related to telemedicine.    DATA  Interactive Complexity: No     Crisis: No        Progress Since Last Session (Related to Symptoms / Goals / Homework):   Symptoms:  some anxiety    Homework: Partially completed      Episode of Care Goals: Satisfactory progress - ACTION (Actively working towards change); Intervened by reinforcing change plan / affirming steps taken- ongoing hip pain making her slow down; going to [facility] next week.     Current / Ongoing Stressors and Concerns: Patient has been slowing down due to a hip pain. She is in a process of having surgery at [facility]. Though has managed to keep her work.  "Has been pushing the initial thoughts of self blame and sabotaging her job due to distraction  which was affecting her productivity. She notes also current pay is really low like not even matching her pay by in 2000's. However, she notes she feels good with her team and since she is retired, she has no reason to keep looking for job.  Shared how \" responsible adult) she has been in term of keeping her job. Patient would love to go back to [employer] at the [employer] which helps her to keep busy and feel accomplished. Until her hip is better, she is putting this involvement on hold. No SI. She notes no other concern today. Only had to leave early due to much going on including her job today. Her next visit is a week.     Treatment Objective(s) Addressed in This Session:   identify at least 3 strategies to more effectively address stressors     Intervention:    Reviewed the use of Grounding techniques to distract from pain :  see 5; touch 4; hear 3; smell 2, taste 1 as provided via [employer]             Safety plan:Completed the safety plan as well. CBT: continue to challenge any negative traps related to her employment retention as they     present.     Assessments completed prior to visit:    The following assessments were completed by patient for this visit:  PHQ2:       3/11/2024     8:36 AM 1/4/2024     9:00 AM 4/3/2023     1:17 PM 12/7/2022     2:32 PM 10/21/2022     9:32 AM 3/27/2022     3:12 PM 3/27/2022     3:09 PM   PHQ-2 ( 1999 [employer])   Q1: Little interest or pleasure in doing things 0 1 0 0 0 1 1   Q2: Feeling down, depressed or hopeless 1 1 0 1 0 1 1   PHQ-2 Score 1 2 0 1 0 2 2   Q1: Little interest or pleasure in doing things Not at all Several days  Not at all  Several days Several days   Q2: Feeling down, depressed or hopeless Several days Several days  Several days  Several days Several days   PHQ-2 Score 1 2  1  2 2     PHQ9:       8/23/2021     9:12 AM 10/17/2021     5:49 PM 11/19/2021     8:34 AM " 12/7/2022     2:29 PM 12/4/2023     3:51 PM 1/5/2024     9:10 AM 3/29/2024     1:09 PM   PHQ-9 SCORE   PHQ-9 Total Score MyChart 8 (Mild depression) 12 (Moderate depression) 14 (Moderate depression) 3 (Minimal depression) 9 (Mild depression) 9 (Mild depression)    PHQ-9 Total Score 8 12 14 3 9 9 7     GAD2:       12/1/2023    11:23 AM 12/11/2023    10:02 AM 3/11/2024     8:36 AM 3/22/2024     9:33 PM   LYNETTE-2   Feeling nervous, anxious, or on edge 0 0 0 1   Not being able to stop or control worrying 0 0 0 0   LYNETTE-2 Total Score 0    0 0    0 0 1     GAD7:       3/23/2018     1:09 PM 4/3/2018     1:41 PM 1/11/2019     2:41 PM 8/23/2021     9:12 AM 12/7/2022     2:29 PM 12/4/2023     4:45 PM 2/12/2024     4:57 PM   LYNETTE-7 SCORE   Total Score  2 (minimal anxiety) 19 (severe anxiety) 1 (minimal anxiety) 0 (minimal anxiety)     Total Score 1 2 19 1 0 1 2     CAGE-AID:       12/1/2023    11:25 AM   CAGE-AID Total Score   Total Score 0    0   Total Score MyChart 0 (A total score of 2 or greater is considered clinically significant)     PROMIS 10-Global Health (all questions and answers displayed):       12/1/2023    11:24 AM 12/11/2023    10:02 AM 3/11/2024     8:37 AM 3/22/2024     9:34 PM   PROMIS 10   In general, would you say your health is: Good Good Good Good   In general, would you say your quality of life is: Excellent Very good Very good Good   In general, how would you rate your physical health? Good Very good Good Fair   In general, how would you rate your mental health, including your mood and your ability to think? Good Good Very good Very good   In general, how would you rate your satisfaction with your social activities and relationships? Good Good Good Very good   In general, please rate how well you carry out your usual social activities and roles Good Very good Good Very good   To what extent are you able to carry out your everyday physical activities such as walking, climbing stairs, carrying groceries, or  moving a chair? Mostly Mostly A little A little   In the past 7 days, how often have you been bothered by emotional problems such as feeling anxious, depressed, or irritable? Rarely Rarely Rarely Rarely   In the past 7 days, how would you rate your fatigue on average? Moderate Moderate Mild Moderate   In the past 7 days, how would you rate your pain on average, where 0 means no pain, and 10 means worst imaginable pain? 4 6 6 7   In general, would you say your health is: 3 3 3 3   In general, would you say your quality of life is: 5 4 4 3   In general, how would you rate your physical health? 3 4 3 2   In general, how would you rate your mental health, including your mood and your ability to think? 3 3 4 4   In general, how would you rate your satisfaction with your social activities and relationships? 3 3 3 4   In general, please rate how well you carry out your usual social activities and roles. (This includes activities at home, at work and in your community, and responsibilities as a parent, child, spouse, employee, friend, etc.) 3 4 3 4   To what extent are you able to carry out your everyday physical activities such as walking, climbing stairs, carrying groceries, or moving a chair? 4 4 2 2   In the past 7 days, how often have you been bothered by emotional problems such as feeling anxious, depressed, or irritable? 2 2 2 2   In the past 7 days, how would you rate your fatigue on average? 3 3 2 3   In the past 7 days, how would you rate your pain on average, where 0 means no pain, and 10 means worst imaginable pain? 4 6 6 7   Global Mental Health Score 15    15 14    14 15 15   Global Physical Health Score 13    13 14    14 12 9   PROMIS TOTAL - SUBSCORES 28    28 28    28 27 24     [place] Suicide Severity Rating Scale (Lifetime/Recent)      2/12/2024     5:00 PM   [place] Suicide Severity Rating (Lifetime/Recent)   Q1 Wish to be Dead (Lifetime) N   Q2 Non-Specific Active Suicidal Thoughts (Lifetime) N    Actual Attempt (Lifetime) N   Has subject engaged in non-suicidal self-injurious behavior? (Lifetime) N   Interrupted Attempts (Lifetime) N   Aborted or Self-Interrupted Attempt (Lifetime) N   Preparatory Acts or Behavior (Lifetime) N   Calculated C-SSRS Risk Score (Lifetime/Recent) No Risk Indicated     [place] Suicide Severity Rating Scale (Short Version)      10/6/2022    11:49 AM 10/20/2022    11:44 AM 12/4/2023     4:27 PM   [place] Suicide Severity Rating (Short Version)   Over the past 2 weeks have you felt down, depressed, or hopeless? no no    Over the past 2 weeks have you had thoughts of killing yourself? no no    Have you ever attempted to kill yourself? no no    1. Wish to be Dead (Since Last Contact)   N   2. Non-Specific Active Suicidal Thoughts (Since Last Contact)   N   Actual Attempt (Since Last Contact)   N   Has subject engaged in non-suicidal self-injurious behavior? (Since Last Contact)   N   Interrupted Attempts (Since Last Contact)   N   Aborted or Self-Interrupted Attempt (Since Last Contact)   N   Preparatory Acts or Behavior (Since Last Contact)   N   Suicide (Since Last Contact)   N   Calculated C-SSRS Risk Score (Since Last Contact)   No Risk Indicated      ASSESSMENT: Current Emotional / Mental Status (status of significant symptoms):   Risk status (Self / Other harm or suicidal ideation)   Patient denies current fears or concerns for personal safety.   Patient denies current or recent suicidal ideation or behaviors.   Patient denies current or recent homicidal ideation or behaviors.   Patient denies current or recent self injurious behavior or ideation.   Patient denies other safety concerns.   Patient reports there has been no change in risk factors since their last session.     Patient reports there has been no change in protective factors since their last session.     Recommended that patient call 911 or go to the local ED should there be a change in any of these risk  factors.     Appearance:   Appropriate    Eye Contact:   Good    Psychomotor Behavior: Normal    Attitude:   Cooperative    Orientation:   All   Speech    Rate / Production: Normal/ Responsive    Volume:  Normal    Mood:    Normal   Affect:    Appropriate    Thought Content:  Clear    Thought Form:  Coherent  Logical    Insight:    Good      Medication Review:   No changes to current psychiatric medication(s)     Medication Compliance:   Yes     Changes in Health Issues:   None reported     Chemical Use Review:   Substance Use: Chemical use reviewed, no active concerns identified      Tobacco Use: No current tobacco use.      Diagnosis:  1. Adjustment disorder with anxious mood      Collateral Reports Completed:   Not Applicable    PLAN: (Patient Tasks / Therapist Tasks / Other)  Patient will practice her 5 senses to distract her mind from the pain  Patient will keep up with her healthy life lexx  Patient's next visit is in a week  KIANNA Mcmullen  ____________________________________________________________________  Individual Treatment Plan    Patient's Name: Nasreen Galarza  YOB: 1955    Date of Creation: 2/12/2024    Date Treatment Plan Last Reviewed/Revised: 2/12/2024    DSM5 Diagnoses: 300.02 (F41.1) Generalized Anxiety Disorder or Adjustment Disorders  309.28 (F43.23) With mixed anxiety and depressed mood    Psychosocial / Contextual Factors: Anxiety related to job maintenance.     PROMIS (reviewed every 90 days): 28    Referral / Collaboration:  Referral to another professional/service is not indicated at this time..    Anticipated number of session for this episode of care: 9-12 sessions  Anticipation frequency of session: Biweekly  Anticipated Duration of each session: 38-52 minutes  Treatment plan will be reviewed in 90 days or when goals have been changed.     MeasurableTreatment Goal(s) related to diagnosis / functional impairment(s)  Goal 1: Patient will Stabilize anxiety level  while increasing ability to function on a daily basis at least by the next review( 90 days)    I will know I've met my goal when I am able to control my anxiety at least at 80 % of the time by next review ( 90 days).      Objective #A (Patient Action)    Patient will use thought-stopping strategy daily to reduce intrusive thoughts.  Status: New - Date: 2/12/2024      Intervention(s)  Therapist will teach  teach the CBT skills to challenge any cognitive traps related to her daily functions .    Goal 2: Patient will alleviate depressed mood and return to her the previous level of effective functioning.    I will know I've met my goal when I am able to cope with depressed mood at least at 80 % of the time by the next review ( 90 days).      Objective #A (Patient Action)    Status: New - Date: 2/12/2024      Patient will Decrease frequency and intensity of feeling down, depressed, hopeless.    Intervention(s)  Therapist will assign homework and teach grounding techniques and other mindfulness exercises and reinforce them.    Patient has reviewed and agreed to the above plan.      KIANNA Mcmullen  February 12, 2024

## 2024-04-11 ENCOUNTER — VIRTUAL VISIT (OUTPATIENT)
Dept: PSYCHOLOGY | Facility: CLINIC | Age: 69
End: 2024-04-11
Payer: COMMERCIAL

## 2024-04-11 ENCOUNTER — MYC MEDICAL ADVICE (OUTPATIENT)
Dept: FAMILY MEDICINE | Facility: CLINIC | Age: 69
End: 2024-04-11
Payer: COMMERCIAL

## 2024-04-11 DIAGNOSIS — N32.81 OVERACTIVE BLADDER: ICD-10-CM

## 2024-04-11 DIAGNOSIS — F43.23 ADJUSTMENT DISORDER WITH MIXED ANXIETY AND DEPRESSED MOOD: Primary | ICD-10-CM

## 2024-04-11 DIAGNOSIS — Z96.89 S/P INSERTION OF SACRAL NERVE STIMULATOR: Primary | ICD-10-CM

## 2024-04-11 PROCEDURE — 90834 PSYTX W PT 45 MINUTES: CPT | Mod: 95 | Performed by: SOCIAL WORKER

## 2024-04-11 NOTE — PROGRESS NOTES
[facility]                                     Progress Note    Patient Name: Nasreen Galarza  Date: 4/11/2024         Service Type: Individual      Session Start Time: 12:03 Session End Time:12:50     Session Length:47    Session #:4    Attendees: Client attended alone    Service Modality:  Video Visit:      Provider verified identity through the following two step process.  Patient provided:  Patient is known previously to provider    Telemedicine Visit: The patient's condition can be safely assessed and treated via synchronous audio and visual telemedicine encounter.      Reason for Telemedicine Visit: Services only offered telehealth    Originating Site (Patient Location): Patient's home    Distant Site (Provider Location): Provider Remote Setting- Home Office    Consent:  The patient/guardian has verbally consented to: the potential risks and benefits of telemedicine (video visit) versus in person care; bill my insurance or make self-payment for services provided; and responsibility for payment of non-covered services.     Patient would like the video invitation sent by:  My Chart    Mode of Communication:  Video Conference via [employer]well    Distant Location (Provider):  Off-site    As the provider I attest to compliance with applicable laws and regulations related to telemedicine.    DATA  Interactive Complexity: No     Crisis: No     Progress Since Last Session (Related to Symptoms / Goals / Homework):   Symptoms:  physical pain, sad    Homework: Partially completed      Episode of Care Goals: Satisfactory progress - ACTION (Actively working towards change); Intervened by reinforcing change plan / affirming steps taken- ongoing hip pain and knee pain. Has seen her provider at [facility].     Current / Ongoing Stressors and Concerns: Patient did what she said she will do; attended her appt at [facility] early this week. Has been working on bed due to her hip and knee pain. Will have a  surgery in July. Has been working well and hopes to get a good review after 6 months working at her current job. Has been thinking about making her home better organized and accessible. Has someone coming in to clean and organize and she is working on allowing herself to let go her bedroom to be organized as well. Patient and writer discussed about increasing confidence and even measuring herself where she is at in keeping her job and her home organized the way she feels is better.  Concerns about working at [employer] due to her hip. Will take some time at his moment. Patient reports no other concerns today. Her next visit is in a week.     Treatment Objective(s) Addressed in This Session:   identify at least 3  strategies to more effectively address stressors     Intervention:   Person centered :  offered empathy and space to process thoughts and feelings related to physical health issues             Practice of self confidence scale: Writer discussed with the patient how to use this scale so she can keep track on how she is doing on her own.  Assessments completed prior to visit:    The following assessments were completed by patient for this visit:  PHQ2:       3/11/2024     8:36 AM 1/4/2024     9:00 AM 4/3/2023     1:17 PM 12/7/2022     2:32 PM 10/21/2022     9:32 AM 3/27/2022     3:12 PM 3/27/2022     3:09 PM   PHQ-2 ( 1999 [employer])   Q1: Little interest or pleasure in doing things 0 1 0 0 0 1 1   Q2: Feeling down, depressed or hopeless 1 1 0 1 0 1 1   PHQ-2 Score 1 2 0 1 0 2 2   Q1: Little interest or pleasure in doing things Not at all Several days  Not at all  Several days Several days   Q2: Feeling down, depressed or hopeless Several days Several days  Several days  Several days Several days   PHQ-2 Score 1 2  1  2 2     PHQ9:       8/23/2021     9:12 AM 10/17/2021     5:49 PM 11/19/2021     8:34 AM 12/7/2022     2:29 PM 12/4/2023     3:51 PM 1/5/2024     9:10 AM 3/29/2024     1:09 PM   PHQ-9 SCORE   PHQ-9  Total Score MyChart 8 (Mild depression) 12 (Moderate depression) 14 (Moderate depression) 3 (Minimal depression) 9 (Mild depression) 9 (Mild depression)    PHQ-9 Total Score 8 12 14 3 9 9 7     GAD2:       12/1/2023    11:23 AM 12/11/2023    10:02 AM 3/11/2024     8:36 AM 3/22/2024     9:33 PM   LYNETTE-2   Feeling nervous, anxious, or on edge 0 0 0 1   Not being able to stop or control worrying 0 0 0 0   LYNETTE-2 Total Score 0    0 0    0 0 1     GAD7:       3/23/2018     1:09 PM 4/3/2018     1:41 PM 1/11/2019     2:41 PM 8/23/2021     9:12 AM 12/7/2022     2:29 PM 12/4/2023     4:45 PM 2/12/2024     4:57 PM   LYNETTE-7 SCORE   Total Score  2 (minimal anxiety) 19 (severe anxiety) 1 (minimal anxiety) 0 (minimal anxiety)     Total Score 1 2 19 1 0 1 2     CAGE-AID:       12/1/2023    11:25 AM   CAGE-AID Total Score   Total Score 0    0   Total Score MyChart 0 (A total score of 2 or greater is considered clinically significant)     PROMIS 10-Global Health (all questions and answers displayed):       12/1/2023    11:24 AM 12/11/2023    10:02 AM 3/11/2024     8:37 AM 3/22/2024     9:34 PM   PROMIS 10   In general, would you say your health is: Good Good Good Good   In general, would you say your quality of life is: Excellent Very good Very good Good   In general, how would you rate your physical health? Good Very good Good Fair   In general, how would you rate your mental health, including your mood and your ability to think? Good Good Very good Very good   In general, how would you rate your satisfaction with your social activities and relationships? Good Good Good Very good   In general, please rate how well you carry out your usual social activities and roles Good Very good Good Very good   To what extent are you able to carry out your everyday physical activities such as walking, climbing stairs, carrying groceries, or moving a chair? Mostly Mostly A little A little   In the past 7 days, how often have you been bothered by  emotional problems such as feeling anxious, depressed, or irritable? Rarely Rarely Rarely Rarely   In the past 7 days, how would you rate your fatigue on average? Moderate Moderate Mild Moderate   In the past 7 days, how would you rate your pain on average, where 0 means no pain, and 10 means worst imaginable pain? 4 6 6 7   In general, would you say your health is: 3 3 3 3   In general, would you say your quality of life is: 5 4 4 3   In general, how would you rate your physical health? 3 4 3 2   In general, how would you rate your mental health, including your mood and your ability to think? 3 3 4 4   In general, how would you rate your satisfaction with your social activities and relationships? 3 3 3 4   In general, please rate how well you carry out your usual social activities and roles. (This includes activities at home, at work and in your community, and responsibilities as a parent, child, spouse, employee, friend, etc.) 3 4 3 4   To what extent are you able to carry out your everyday physical activities such as walking, climbing stairs, carrying groceries, or moving a chair? 4 4 2 2   In the past 7 days, how often have you been bothered by emotional problems such as feeling anxious, depressed, or irritable? 2 2 2 2   In the past 7 days, how would you rate your fatigue on average? 3 3 2 3   In the past 7 days, how would you rate your pain on average, where 0 means no pain, and 10 means worst imaginable pain? 4 6 6 7   Global Mental Health Score 15    15 14    14 15 15   Global Physical Health Score 13    13 14    14 12 9   PROMIS TOTAL - SUBSCORES 28    28 28    28 27 24     [place] Suicide Severity Rating Scale (Lifetime/Recent)      2/12/2024     5:00 PM   [place] Suicide Severity Rating (Lifetime/Recent)   Q1 Wish to be Dead (Lifetime) N   Q2 Non-Specific Active Suicidal Thoughts (Lifetime) N   Actual Attempt (Lifetime) N   Has subject engaged in non-suicidal self-injurious behavior? (Lifetime) N    Interrupted Attempts (Lifetime) N   Aborted or Self-Interrupted Attempt (Lifetime) N   Preparatory Acts or Behavior (Lifetime) N   Calculated C-SSRS Risk Score (Lifetime/Recent) No Risk Indicated     [place] Suicide Severity Rating Scale (Short Version)      10/6/2022    11:49 AM 10/20/2022    11:44 AM 12/4/2023     4:27 PM   [place] Suicide Severity Rating (Short Version)   Over the past 2 weeks have you felt down, depressed, or hopeless? no no    Over the past 2 weeks have you had thoughts of killing yourself? no no    Have you ever attempted to kill yourself? no no    1. Wish to be Dead (Since Last Contact)   N   2. Non-Specific Active Suicidal Thoughts (Since Last Contact)   N   Actual Attempt (Since Last Contact)   N   Has subject engaged in non-suicidal self-injurious behavior? (Since Last Contact)   N   Interrupted Attempts (Since Last Contact)   N   Aborted or Self-Interrupted Attempt (Since Last Contact)   N   Preparatory Acts or Behavior (Since Last Contact)   N   Suicide (Since Last Contact)   N   Calculated C-SSRS Risk Score (Since Last Contact)   No Risk Indicated      ASSESSMENT: Current Emotional / Mental Status (status of significant symptoms):   Risk status (Self / Other harm or suicidal ideation)   Patient denies current fears or concerns for personal safety.   Patient denies current or recent suicidal ideation or behaviors.   Patient denies current or recent homicidal ideation or behaviors.   Patient denies current or recent self injurious behavior or ideation.   Patient denies other safety concerns.   Patient reports there has been no change in risk factors since their last session.     Patient reports there has been no change in protective factors since their last session.     Recommended that patient call 911 or go to the local ED should there be a change in any of these risk factors.     Appearance:   Appropriate    Eye Contact:   Good    Psychomotor Behavior: Normal     Attitude:   Cooperative    Orientation:   Person Place Time Situation   Speech    Rate / Production: Normal/ Responsive    Volume:  Normal    Mood:    Normal   Affect:    Appropriate    Thought Content:  Clear    Thought Form:  Coherent  Logical    Insight:    Good      Medication Review:   No changes to current psychiatric medication(s)     Medication Compliance:   Yes     Changes in Health Issues:   None reported     Chemical Use Review:   Substance Use: Chemical use reviewed, no active concerns identified      Tobacco Use: No current tobacco use.      Diagnosis:  1. Adjustment disorder with mixed anxiety and depressed mood        Collateral Reports Completed:   Not Applicable    PLAN: (Patient Tasks / Therapist Tasks / Other): will keep these goals  Patient will practice her 5 senses to distract her mind from the pain  Patient will keep up with her healthy life lexx  Patient's next visit is in a week  KIANNA Mcmullen  ____________________________________________________________________  Individual Treatment Plan    Patient's Name: Nasreen Galarza  YOB: 1955    Date of Creation: 2/12/2024    Date Treatment Plan Last Reviewed/Revised: 2/12/2024    DSM5 Diagnoses: 300.02 (F41.1) Generalized Anxiety Disorder or Adjustment Disorders  309.28 (F43.23) With mixed anxiety and depressed mood    Psychosocial / Contextual Factors: Anxiety related to job maintenance.     PROMIS (reviewed every 90 days): 28    Referral / Collaboration:  Referral to another professional/service is not indicated at this time..    Anticipated number of session for this episode of care: 9-12 sessions  Anticipation frequency of session: Biweekly  Anticipated Duration of each session: 38-52 minutes  Treatment plan will be reviewed in 90 days or when goals have been changed.     MeasurableTreatment Goal(s) related to diagnosis / functional impairment(s)  Goal 1: Patient will Stabilize anxiety level while increasing ability to  function on a daily basis at least by the next review( 90 days)    I will know I've met my goal when I am able to control my anxiety at least at 80 % of the time by next review ( 90 days).      Objective #A (Patient Action)    Patient will use thought-stopping strategy daily to reduce intrusive thoughts.  Status: New - Date: 2/12/2024      Intervention(s)  Therapist will teach  teach the CBT skills to challenge any cognitive traps related to her daily functions .    Goal 2: Patient will alleviate depressed mood and return to her the previous level of effective functioning.    I will know I've met my goal when I am able to cope with depressed mood at least at 80 % of the time by the next review ( 90 days).      Objective #A (Patient Action)    Status: New - Date: 2/12/2024      Patient will Decrease frequency and intensity of feeling down, depressed, hopeless.    Intervention(s)  Therapist will assign homework and teach grounding techniques and other mindfulness exercises and reinforce them.    Patient has reviewed and agreed to the above plan.      KIANNA Mcmullen  February 12, 2024

## 2024-04-15 ENCOUNTER — TELEPHONE (OUTPATIENT)
Dept: UROLOGY | Facility: CLINIC | Age: 69
End: 2024-04-15
Payer: COMMERCIAL

## 2024-04-15 DIAGNOSIS — E78.5 HYPERLIPIDEMIA LDL GOAL <100: ICD-10-CM

## 2024-04-15 RX ORDER — ATORVASTATIN CALCIUM 80 MG/1
80 TABLET, FILM COATED ORAL DAILY
Qty: 90 TABLET | Refills: 1 | OUTPATIENT
Start: 2024-04-15

## 2024-04-15 NOTE — TELEPHONE ENCOUNTER
[facility] Call Center    Phone Message    May a detailed message be left on voicemail: yes     Reason for Call: Other: Patient called in to make an appointment for a referral for uro/gyn. DX: Over Active Bladder, patient is looking to have sacral nerve stimulator for this dx. Sending TE due to being a uro/gyn and unsure of appropriate provider for dx/ what patient is looking to have done. Please review and reach out to patient for appropriate provider/to schedule.      Action Taken: Other: Urology     Travel Screening: Not Applicable

## 2024-04-18 ENCOUNTER — MYC REFILL (OUTPATIENT)
Dept: FAMILY MEDICINE | Facility: CLINIC | Age: 69
End: 2024-04-18
Payer: COMMERCIAL

## 2024-04-18 DIAGNOSIS — E03.9 HYPOTHYROIDISM, UNSPECIFIED TYPE: ICD-10-CM

## 2024-04-18 DIAGNOSIS — E78.5 HYPERLIPIDEMIA LDL GOAL <100: ICD-10-CM

## 2024-04-18 RX ORDER — ATORVASTATIN CALCIUM 80 MG/1
80 TABLET, FILM COATED ORAL DAILY
Qty: 90 TABLET | Refills: 1 | OUTPATIENT
Start: 2024-04-18

## 2024-04-18 RX ORDER — LEVOTHYROXINE SODIUM 150 UG/1
150 TABLET ORAL DAILY
Qty: 90 TABLET | Refills: 1 | OUTPATIENT
Start: 2024-04-18

## 2024-04-18 NOTE — TELEPHONE ENCOUNTER
Patient confirmed scheduled appointment:  Date: April 24th   Time: 2:15pm   Visit type: New Urology   Provider: Dr. Palomino   Location: [facility]  Testing/imaging: N/A   Additional notes: New referral

## 2024-04-18 NOTE — TELEPHONE ENCOUNTER
MEDICAL RECORDS REQUEST   [facility]  [facility]rology Clinic  [address] [place] [address]  PHONE: [phone]  Fax: [phone]        FUTURE VISIT INFORMATION                                                   Nasreenmichela OdellISAIAS humphrey: 1955 scheduled for future visit at [facility]    APPOINTMENT INFORMATION:  Date: 2024  Provider:  Candi Palomino MD  Reason for Visit/Diagnosis: Over Active Bladder -- Patient has an implanted sacral nerve stimulator treatment for OAB. desires to see Urogyn.    REFERRAL INFORMATION:  Referring provider:  Noemi Mayer MD in [facility]      RECORDS REQUESTED FOR VISIT                                                     NOTES  STATUS/DETAILS   OFFICE NOTE from referring provider  yes, 2024 -- Noemi Mayer MD in [facility]   OFFICE NOTE from other specialist  yes   MEDICATION LIST  yes   IMAGES  yes, 2023 -- NM BONE SCAN WHOLE BODY  2022 -- CT ABD        PRE-VISIT CHECKLIST      Joint diagnostic appointment coordinated correctly          (ensure right order & amount of time) Yes   RECORD COLLECTION COMPLETE Yes

## 2024-04-19 ENCOUNTER — VIRTUAL VISIT (OUTPATIENT)
Dept: PSYCHOLOGY | Facility: CLINIC | Age: 69
End: 2024-04-19
Payer: COMMERCIAL

## 2024-04-19 ENCOUNTER — PRE VISIT (OUTPATIENT)
Dept: UROLOGY | Facility: CLINIC | Age: 69
End: 2024-04-19
Payer: COMMERCIAL

## 2024-04-19 DIAGNOSIS — F43.23 ADJUSTMENT DISORDER WITH MIXED ANXIETY AND DEPRESSED MOOD: Primary | ICD-10-CM

## 2024-04-19 PROCEDURE — 90834 PSYTX W PT 45 MINUTES: CPT | Mod: 95 | Performed by: SOCIAL WORKER

## 2024-04-19 NOTE — TELEPHONE ENCOUNTER
Reason for visit: consult     Relevant information: overactive bladder, sacral nerve stimulator consult    Records/imaging/labs/orders: in epic    Pt called: No need for a call    At Rooming: virtual visit    Navi Dan  4/19/2024  9:10 AM

## 2024-04-20 ASSESSMENT — PATIENT HEALTH QUESTIONNAIRE - PHQ9: 5. POOR APPETITE OR OVEREATING: NOT AT ALL

## 2024-04-20 ASSESSMENT — ANXIETY QUESTIONNAIRES
1. FEELING NERVOUS, ANXIOUS, OR ON EDGE: SEVERAL DAYS
IF YOU CHECKED OFF ANY PROBLEMS ON THIS QUESTIONNAIRE, HOW DIFFICULT HAVE THESE PROBLEMS MADE IT FOR YOU TO DO YOUR WORK, TAKE CARE OF THINGS AT HOME, OR GET ALONG WITH OTHER PEOPLE: NOT DIFFICULT AT ALL
7. FEELING AFRAID AS IF SOMETHING AWFUL MIGHT HAPPEN: NOT AT ALL
2. NOT BEING ABLE TO STOP OR CONTROL WORRYING: SEVERAL DAYS
GAD7 TOTAL SCORE: 2
3. WORRYING TOO MUCH ABOUT DIFFERENT THINGS: NOT AT ALL
5. BEING SO RESTLESS THAT IT IS HARD TO SIT STILL: NOT AT ALL
GAD7 TOTAL SCORE: 2
6. BECOMING EASILY ANNOYED OR IRRITABLE: NOT AT ALL

## 2024-04-20 NOTE — PROGRESS NOTES
[facility]                                     Progress Note    Patient Name: Nasreen Galarza  Date: 4/19/2024         Service Type: Individual      Session Start Time: 11:05 Session End Time:11:50     Session Length:45    Session #:5    Attendees: Client attended alone    Service Modality:  Video Visit:      Provider verified identity through the following two step process.  Patient provided:  Patient is known previously to provider    Telemedicine Visit: The patient's condition can be safely assessed and treated via synchronous audio and visual telemedicine encounter.      Reason for Telemedicine Visit: Services only offered telehealth    Originating Site (Patient Location): Patient's home    Distant Site (Provider Location): [facility] [place]    Consent:  The patient/guardian has verbally consented to: the potential risks and benefits of telemedicine (video visit) versus in person care; bill my insurance or make self-payment for services provided; and responsibility for payment of non-covered services.     Patient would like the video invitation sent by:  My Chart    Mode of Communication:  Video Conference via Amwell    Distant Location (Provider):  On-site    As the provider I attest to compliance with applicable laws and regulations related to telemedicine.    DATA  Interactive Complexity: No     Crisis: No     Progress Since Last Session (Related to Symptoms / Goals / Homework):   Symptoms:  physical pain makes her feel sad and anxious    Homework: Partially completed      Episode of Care Goals: Satisfactory progress - ACTION (Actively working towards change); Intervened by reinforcing change plan / affirming steps taken- slow with physical pain.     Current / Ongoing Stressors and Concerns: Patient continues to experience knee pain. She has been working with her providers a [facility] and will need a surgery. Continues to work but the day  gets harder when she has to be onsite. At home she is moves around to find a comfortable spot.  Patient other wise shares she loves her job and hopes to keep it. Has taken a break from working at [employer] where she shares she finds connected to the music. She was playing piano when she was younger. Finds it has been over 30 years without playing. Agreed  to the idea to try playing Piano again. Discussed the benefits even at old age.  Patient had an opportunity to share her experiences while being raised by two moms in 60s. Being adopted by two women did not affect her. She had a good childhood and felt loved.  Had a good education in  private schools and felt she was successful. She had a good experience feeling loved but the parents were fighting all the time.  Has one son(40) who lives in [place] ,  early on and she has not seen her ex  since 2010. Patient provided some details of what was around her growing up and how she became a peace maker between her parents who were always fighting. One of her parents had some issues with her father which patient believes might have had impact in her relationship. Patient shared she is grateful for where she is at. Only her physical is slowing her down. She is optimistic and hopes to keep her job and also to heal her knee. Will return on 5/14.     Treatment Objective(s) Addressed in This Session:   identify at least 3  strategies to more effectively address stressors     Intervention:   Person centered :  opened space to share her past experiences for support.             Practice of self confidence scale  Assessments completed prior to visit:    The following assessments were completed by patient for this visit:  PHQ2:       4/18/2024     6:18 PM 3/11/2024     8:36 AM 1/4/2024     9:00 AM 4/3/2023     1:17 PM 12/7/2022     2:32 PM 10/21/2022     9:32 AM 3/27/2022     3:12 PM   PHQ-2 ( 1999 [employer])   Q1: Little interest or pleasure in doing things 0 0 1 0 0 0 1    Q2: Feeling down, depressed or hopeless 0 1 1 0 1 0 1   PHQ-2 Score 0 1 2 0 1 0 2   Q1: Little interest or pleasure in doing things Not at all Not at all Several days  Not at all  Several days   Q2: Feeling down, depressed or hopeless Not at all Several days Several days  Several days  Several days   PHQ-2 Score 0 1 2  1  2     PHQ9:       8/23/2021     9:12 AM 10/17/2021     5:49 PM 11/19/2021     8:34 AM 12/7/2022     2:29 PM 12/4/2023     3:51 PM 1/5/2024     9:10 AM 3/29/2024     1:09 PM   PHQ-9 SCORE   PHQ-9 Total Score MyChart 8 (Mild depression) 12 (Moderate depression) 14 (Moderate depression) 3 (Minimal depression) 9 (Mild depression) 9 (Mild depression)    PHQ-9 Total Score 8 12 14 3 9 9 7     GAD2:       12/1/2023    11:23 AM 12/11/2023    10:02 AM 3/11/2024     8:36 AM 3/22/2024     9:33 PM 4/18/2024     6:18 PM   LYNETTE-2   Feeling nervous, anxious, or on edge 0 0 0 1 0   Not being able to stop or control worrying 0 0 0 0 0   LYNETTE-2 Total Score 0    0 0    0 0 1 0     GAD7:       4/3/2018     1:41 PM 1/11/2019     2:41 PM 8/23/2021     9:12 AM 12/7/2022     2:29 PM 12/4/2023     4:45 PM 2/12/2024     4:57 PM 4/20/2024    10:47 AM   LYNETTE-7 SCORE   Total Score 2 (minimal anxiety) 19 (severe anxiety) 1 (minimal anxiety) 0 (minimal anxiety)      Total Score 2 19 1 0 1 2 2     CAGE-AID:       12/1/2023    11:25 AM   CAGE-AID Total Score   Total Score 0    0   Total Score MyChart 0 (A total score of 2 or greater is considered clinically significant)     PROMIS 10-Global Health (all questions and answers displayed):       12/1/2023    11:24 AM 12/11/2023    10:02 AM 3/11/2024     8:37 AM 3/22/2024     9:34 PM 4/18/2024     6:19 PM   PROMIS 10   In general, would you say your health is: Good Good Good Good Good   In general, would you say your quality of life is: Excellent Very good Very good Good Good   In general, how would you rate your physical health? Good Very good Good Fair Good   In general, how would you  rate your mental health, including your mood and your ability to think? Good Good Very good Very good Very good   In general, how would you rate your satisfaction with your social activities and relationships? Good Good Good Very good Very good   In general, please rate how well you carry out your usual social activities and roles Good Very good Good Very good Good   To what extent are you able to carry out your everyday physical activities such as walking, climbing stairs, carrying groceries, or moving a chair? Mostly Mostly A little A little Moderately   In the past 7 days, how often have you been bothered by emotional problems such as feeling anxious, depressed, or irritable? Rarely Rarely Rarely Rarely Never   In the past 7 days, how would you rate your fatigue on average? Moderate Moderate Mild Moderate Moderate   In the past 7 days, how would you rate your pain on average, where 0 means no pain, and 10 means worst imaginable pain? 4 6 6 7 7   In general, would you say your health is: 3 3 3 3 3   In general, would you say your quality of life is: 5 4 4 3 3   In general, how would you rate your physical health? 3 4 3 2 3   In general, how would you rate your mental health, including your mood and your ability to think? 3 3 4 4 4   In general, how would you rate your satisfaction with your social activities and relationships? 3 3 3 4 4   In general, please rate how well you carry out your usual social activities and roles. (This includes activities at home, at work and in your community, and responsibilities as a parent, child, spouse, employee, friend, etc.) 3 4 3 4 3   To what extent are you able to carry out your everyday physical activities such as walking, climbing stairs, carrying groceries, or moving a chair? 4 4 2 2 3   In the past 7 days, how often have you been bothered by emotional problems such as feeling anxious, depressed, or irritable? 2 2 2 2 1   In the past 7 days, how would you rate your fatigue  on average? 3 3 2 3 3   In the past 7 days, how would you rate your pain on average, where 0 means no pain, and 10 means worst imaginable pain? 4 6 6 7 7   Global Mental Health Score 15    15 14    14 15 15 16   Global Physical Health Score 13    13 14    14 12 9 11   PROMIS TOTAL - SUBSCORES 28    28 28    28 27 24 27     Bradley Suicide Severity Rating Scale (Lifetime/Recent)      2/12/2024     5:00 PM   Bradley Suicide Severity Rating (Lifetime/Recent)   Q1 Wish to be Dead (Lifetime) N   Q2 Non-Specific Active Suicidal Thoughts (Lifetime) N   Actual Attempt (Lifetime) N   Has subject engaged in non-suicidal self-injurious behavior? (Lifetime) N   Interrupted Attempts (Lifetime) N   Aborted or Self-Interrupted Attempt (Lifetime) N   Preparatory Acts or Behavior (Lifetime) N   Calculated C-SSRS Risk Score (Lifetime/Recent) No Risk Indicated     Bradley Suicide Severity Rating Scale (Short Version)      10/6/2022    11:49 AM 10/20/2022    11:44 AM 12/4/2023     4:27 PM   Bradley Suicide Severity Rating (Short Version)   Over the past 2 weeks have you felt down, depressed, or hopeless? no no    Over the past 2 weeks have you had thoughts of killing yourself? no no    Have you ever attempted to kill yourself? no no    1. Wish to be Dead (Since Last Contact)   N   2. Non-Specific Active Suicidal Thoughts (Since Last Contact)   N   Actual Attempt (Since Last Contact)   N   Has subject engaged in non-suicidal self-injurious behavior? (Since Last Contact)   N   Interrupted Attempts (Since Last Contact)   N   Aborted or Self-Interrupted Attempt (Since Last Contact)   N   Preparatory Acts or Behavior (Since Last Contact)   N   Suicide (Since Last Contact)   N   Calculated C-SSRS Risk Score (Since Last Contact)   No Risk Indicated      ASSESSMENT: Current Emotional / Mental Status (status of significant symptoms):   Risk status (Self / Other harm or suicidal ideation)   Patient denies current fears or concerns for  personal safety.   Patient denies current or recent suicidal ideation or behaviors.   Patient denies current or recent homicidal ideation or behaviors.   Patient denies current or recent self injurious behavior or ideation.   Patient denies other safety concerns.   Patient reports there has been no change in risk factors since their last session.     Patient reports there has been no change in protective factors since their last session.     Recommended that patient call 911 or go to the local ED should there be a change in any of these risk factors.     Appearance:   Appropriate    Eye Contact:   Good    Psychomotor Behavior: Normal    Attitude:   Cooperative    Orientation:   Person Place Time Situation   Speech    Rate / Production: Normal/ Responsive    Volume:  Normal    Mood:    Normal   Affect:    Appropriate    Thought Content:  Clear    Thought Form:  Coherent  Logical    Insight:    Good      Medication Review:   No changes to current psychiatric medication(s)     Medication Compliance:   Yes     Changes in Health Issues:   None reported     Chemical Use Review:   Substance Use: Chemical use reviewed, no active concerns identified      Tobacco Use: No current tobacco use.      Diagnosis:  1. Adjustment disorder with mixed anxiety and depressed mood      Collateral Reports Completed:   Not Applicable    PLAN: (Patient Tasks / Therapist Tasks / Other):   Patient plans to explore the the idea of playing piano again.   Patient will keep up with positive self talk  Patient will keep up with her self confidence scale  Patient's next visit is in 2 weeks.   KIANNA Mcmullen  ____________________________________________________________________  Individual Treatment Plan    Patient's Name: Nasreen Galarza  YOB: 1955    Date of Creation: 2/12/2024    Date Treatment Plan Last Reviewed/Revised: 2/12/2024    DSM5 Diagnoses: 300.02 (F41.1) Generalized Anxiety Disorder or Adjustment Disorders  309.28  (F43.23) With mixed anxiety and depressed mood    Psychosocial / Contextual Factors: Anxiety related to job maintenance.     PROMIS (reviewed every 90 days): 28    Referral / Collaboration:  Referral to another professional/service is not indicated at this time..    Anticipated number of session for this episode of care: 9-12 sessions  Anticipation frequency of session: Biweekly  Anticipated Duration of each session: 38-52 minutes  Treatment plan will be reviewed in 90 days or when goals have been changed.     MeasurableTreatment Goal(s) related to diagnosis / functional impairment(s)  Goal 1: Patient will Stabilize anxiety level while increasing ability to function on a daily basis at least by the next review( 90 days)    I will know I've met my goal when I am able to control my anxiety at least at 80 % of the time by next review ( 90 days).      Objective #A (Patient Action)    Patient will use thought-stopping strategy daily to reduce intrusive thoughts.  Status: New - Date: 2/12/2024      Intervention(s)  Therapist will teach  teach the CBT skills to challenge any cognitive traps related to her daily functions .    Goal 2: Patient will alleviate depressed mood and return to her the previous level of effective functioning.    I will know I've met my goal when I am able to cope with depressed mood at least at 80 % of the time by the next review ( 90 days).      Objective #A (Patient Action)    Status: New - Date: 2/12/2024      Patient will Decrease frequency and intensity of feeling down, depressed, hopeless.    Intervention(s)  Therapist will assign homework and teach grounding techniques and other mindfulness exercises and reinforce them.    Patient has reviewed and agreed to the above plan.      KIANNA Mcmullen  February 12, 2024

## 2024-04-24 ENCOUNTER — PRE VISIT (OUTPATIENT)
Dept: UROLOGY | Facility: CLINIC | Age: 69
End: 2024-04-24

## 2024-04-24 ENCOUNTER — VIRTUAL VISIT (OUTPATIENT)
Dept: UROLOGY | Facility: CLINIC | Age: 69
End: 2024-04-24
Payer: COMMERCIAL

## 2024-04-24 DIAGNOSIS — R39.15 URGENCY OF URINATION: Primary | ICD-10-CM

## 2024-04-24 DIAGNOSIS — Z96.89 S/P INSERTION OF SACRAL NERVE STIMULATOR: ICD-10-CM

## 2024-04-24 DIAGNOSIS — R35.0 FREQUENCY OF URINATION: ICD-10-CM

## 2024-04-24 DIAGNOSIS — N32.81 OVERACTIVE BLADDER: ICD-10-CM

## 2024-04-24 DIAGNOSIS — N95.8 GENITOURINARY SYNDROME OF MENOPAUSE: ICD-10-CM

## 2024-04-24 DIAGNOSIS — N39.41 URGE INCONTINENCE: ICD-10-CM

## 2024-04-24 PROCEDURE — 99204 OFFICE O/P NEW MOD 45 MIN: CPT | Mod: 95 | Performed by: UROLOGY

## 2024-04-24 RX ORDER — ESTRADIOL 0.1 MG/G
2 CREAM VAGINAL
Qty: 42.5 G | Refills: 11 | Status: SHIPPED | OUTPATIENT
Start: 2024-04-25

## 2024-04-24 NOTE — NURSING NOTE
Is the patient currently in the state of [place]? YES    Visit mode:VIDEO    If the visit is dropped, the patient can be reconnected by: VIDEO VISIT: Text to cell phone:   Telephone Information:   Mobile [phone]       Will anyone else be joining the visit? NO  (If patient encounters technical issues they should call [phone]    How would you like to obtain your AVS? MyChart    Are changes needed to the allergy or medication list? No, Pt stated no changes to allergies, and Pt stated no med changes    Are refills needed on medications prescribed by this physician? NO    Reason for visit: Consult    Mariel WHITE

## 2024-04-24 NOTE — PROGRESS NOTES
HPI:  Nasreen Galarza is a 68 year old female with urinary urgency and frequency as well as urge incontinence.  She has used Toviaz since 2016.  However she is would like to stop the medication.  It helps but her symptoms are getting a little worse.  She also has some fecal incontinence.    Reviewed previous notes from Dr. Mayer from 1/5/24    Exam:  LMP  (LMP Unknown)   GENERAL: alert and no distress  EYES: Eyes grossly normal to inspection.  No discharge or erythema, or obvious scleral/conjunctival abnormalities.  RESP: No audible wheeze, cough, or visible cyanosis.    SKIN: Visible skin clear. No significant rash, abnormal pigmentation or lesions.  NEURO: Cranial nerves grossly intact.  Mentation and speech appropriate for age.  PSYCH: Appropriate affect, tone, and pace of words    Review of Imaging:  The following imaging exams were reviewed by me and discussed with patient:  4/18/22 CT abd/pelvis  IMPRESSION:   1.  Normal CT study of the abdomen. Note that the pelvis was not scanned.       Review of Labs:  The following labs were reviewed by me and discussed with the patient:  Lab Results   Component Value Date    WBC 6.8 08/02/2023    WBC 7.5 08/31/2020     Lab Results   Component Value Date    RBC 4.38 08/02/2023    RBC 4.67 08/31/2020     Lab Results   Component Value Date    HGB 12.9 08/02/2023    HGB 13.2 08/31/2020     Lab Results   Component Value Date    HCT 38.2 08/02/2023    HCT 41.2 08/31/2020     Lab Results   Component Value Date    MCV 87 08/02/2023    MCV 88 08/31/2020     Lab Results   Component Value Date    MCH 29.5 08/02/2023    MCH 28.3 08/31/2020     Lab Results   Component Value Date    MCHC 33.8 08/02/2023    MCHC 32.0 08/31/2020     Lab Results   Component Value Date    RDW 13.4 08/02/2023    RDW 14.0 08/31/2020     Lab Results   Component Value Date     08/02/2023     08/31/2020        Last Comprehensive Metabolic Panel:  Sodium   Date Value Ref Range Status   08/02/2023 141  136 - 145 mmol/L Final   08/31/2020 137 133 - 144 mmol/L Final     Potassium   Date Value Ref Range Status   08/02/2023 4.3 3.4 - 5.3 mmol/L Final   08/26/2021 4.1 3.4 - 5.3 mmol/L Final   08/31/2020 4.0 3.4 - 5.3 mmol/L Final     Chloride   Date Value Ref Range Status   08/02/2023 105 98 - 107 mmol/L Final   08/26/2021 107 94 - 109 mmol/L Final   08/31/2020 105 94 - 109 mmol/L Final     Carbon Dioxide   Date Value Ref Range Status   08/31/2020 28 20 - 32 mmol/L Final     Carbon Dioxide (CO2)   Date Value Ref Range Status   08/02/2023 25 22 - 29 mmol/L Final   08/26/2021 24 20 - 32 mmol/L Final     Anion Gap   Date Value Ref Range Status   08/02/2023 11 7 - 15 mmol/L Final   08/26/2021 7 3 - 14 mmol/L Final   08/31/2020 4 3 - 14 mmol/L Final     Glucose   Date Value Ref Range Status   08/02/2023 111 (H) 70 - 99 mg/dL Final   08/26/2021 95 70 - 99 mg/dL Final   08/31/2020 108 (H) 70 - 99 mg/dL Final     Comment:     Fasting specimen     Urea Nitrogen   Date Value Ref Range Status   08/02/2023 17.9 8.0 - 23.0 mg/dL Final   08/26/2021 16 7 - 30 mg/dL Final   08/31/2020 16 7 - 30 mg/dL Final     Creatinine   Date Value Ref Range Status   08/02/2023 0.82 0.51 - 0.95 mg/dL Final   08/31/2020 0.83 0.52 - 1.04 mg/dL Final     GFR Estimate   Date Value Ref Range Status   08/02/2023 77 >60 mL/min/1.73m2 Final   08/31/2020 74 >60 mL/min/[1.73_m2] Final     Comment:     Non  GFR Calc  Starting 12/18/2018, serum creatinine based estimated GFR (eGFR) will be   calculated using the Chronic Kidney Disease Epidemiology Collaboration   (CKD-EPI) equation.       Calcium   Date Value Ref Range Status   08/02/2023 9.8 8.8 - 10.2 mg/dL Final   08/31/2020 9.4 8.5 - 10.1 mg/dL Final     Bilirubin Total   Date Value Ref Range Status   08/02/2023 0.4 <=1.2 mg/dL Final   08/31/2020 0.4 0.2 - 1.3 mg/dL Final     Alkaline Phosphatase   Date Value Ref Range Status   08/02/2023 112 (H) 35 - 104 U/L Final   08/31/2020 140 40 - 150  U/L Final     ALT   Date Value Ref Range Status   08/02/2023 15 0 - 50 U/L Final     Comment:     Reference intervals for this test were updated on 6/12/2023 to more accurately reflect our healthy population. There may be differences in the flagging of prior results with similar values performed with this method. Interpretation of those prior results can be made in the context of the updated reference intervals.     08/31/2020 38 0 - 50 U/L Final     AST   Date Value Ref Range Status   08/02/2023 28 0 - 45 U/L Final     Comment:     Reference intervals for this test were updated on 6/12/2023 to more accurately reflect our healthy population. There may be differences in the flagging of prior results with similar values performed with this method. Interpretation of those prior results can be made in the context of the updated reference intervals.   08/31/2020 28 0 - 45 U/L Final       Assessment & Plan   69 y/o female with urinary urgency and urge incontinence as well as frequency and GUSM.  We discussed options of observation, continued medication, estrogen cream, PTNS, SNS, and botox.  She would like to try estrogen cream and SNS.  -estrace cream Rx   -schedule PNE with [employer]    Candi Palomino MD  [facility] [place]

## 2024-04-24 NOTE — LETTER
4/24/2024       RE: Nasreen Galarza  [address]  [address]  [place] [address]     Dear Colleague,    Thank you for referring your patient, Nasreen Galarza, to the [facility] UROLOGY CLINIC [place] at [facility]. Please see a copy of my visit note below.    Virtual Visit Details    Type of service:  Video Visit   Video Start Time: 2:21 PM  Video End Time:2:35 PM    Originating Location (pt. Location): Home    Distant Location (provider location):  Off-site  Platform used for Video Visit: Hugo    HPI:  Nasreen Galarza is a 68 year old female with urinary urgency and frequency as well as urge incontinence.  She has used Toviaz since 2016.  However she is would like to stop the medication.  It helps but her symptoms are getting a little worse.  She also has some fecal incontinence.    Reviewed previous notes from Dr. Mayer from 1/5/24    Exam:  LMP  (LMP Unknown)   GENERAL: alert and no distress  EYES: Eyes grossly normal to inspection.  No discharge or erythema, or obvious scleral/conjunctival abnormalities.  RESP: No audible wheeze, cough, or visible cyanosis.    SKIN: Visible skin clear. No significant rash, abnormal pigmentation or lesions.  NEURO: Cranial nerves grossly intact.  Mentation and speech appropriate for age.  PSYCH: Appropriate affect, tone, and pace of words    Review of Imaging:  The following imaging exams were reviewed by me and discussed with patient:  4/18/22 CT abd/pelvis  IMPRESSION:   1.  Normal CT study of the abdomen. Note that the pelvis was not scanned.       Review of Labs:  The following labs were reviewed by me and discussed with the patient:  Lab Results   Component Value Date    WBC 6.8 08/02/2023    WBC 7.5 08/31/2020     Lab Results   Component Value Date    RBC 4.38 08/02/2023    RBC 4.67 08/31/2020     Lab Results   Component Value Date    HGB 12.9 08/02/2023    HGB 13.2 08/31/2020     Lab Results   Component Value Date    HCT  38.2 08/02/2023    HCT 41.2 08/31/2020     Lab Results   Component Value Date    MCV 87 08/02/2023    MCV 88 08/31/2020     Lab Results   Component Value Date    MCH 29.5 08/02/2023    MCH 28.3 08/31/2020     Lab Results   Component Value Date    MCHC 33.8 08/02/2023    MCHC 32.0 08/31/2020     Lab Results   Component Value Date    RDW 13.4 08/02/2023    RDW 14.0 08/31/2020     Lab Results   Component Value Date     08/02/2023     08/31/2020        Last Comprehensive Metabolic Panel:  Sodium   Date Value Ref Range Status   08/02/2023 141 136 - 145 mmol/L Final   08/31/2020 137 133 - 144 mmol/L Final     Potassium   Date Value Ref Range Status   08/02/2023 4.3 3.4 - 5.3 mmol/L Final   08/26/2021 4.1 3.4 - 5.3 mmol/L Final   08/31/2020 4.0 3.4 - 5.3 mmol/L Final     Chloride   Date Value Ref Range Status   08/02/2023 105 98 - 107 mmol/L Final   08/26/2021 107 94 - 109 mmol/L Final   08/31/2020 105 94 - 109 mmol/L Final     Carbon Dioxide   Date Value Ref Range Status   08/31/2020 28 20 - 32 mmol/L Final     Carbon Dioxide (CO2)   Date Value Ref Range Status   08/02/2023 25 22 - 29 mmol/L Final   08/26/2021 24 20 - 32 mmol/L Final     Anion Gap   Date Value Ref Range Status   08/02/2023 11 7 - 15 mmol/L Final   08/26/2021 7 3 - 14 mmol/L Final   08/31/2020 4 3 - 14 mmol/L Final     Glucose   Date Value Ref Range Status   08/02/2023 111 (H) 70 - 99 mg/dL Final   08/26/2021 95 70 - 99 mg/dL Final   08/31/2020 108 (H) 70 - 99 mg/dL Final     Comment:     Fasting specimen     Urea Nitrogen   Date Value Ref Range Status   08/02/2023 17.9 8.0 - 23.0 mg/dL Final   08/26/2021 16 7 - 30 mg/dL Final   08/31/2020 16 7 - 30 mg/dL Final     Creatinine   Date Value Ref Range Status   08/02/2023 0.82 0.51 - 0.95 mg/dL Final   08/31/2020 0.83 0.52 - 1.04 mg/dL Final     GFR Estimate   Date Value Ref Range Status   08/02/2023 77 >60 mL/min/1.73m2 Final   08/31/2020 74 >60 mL/min/[1.73_m2] Final     Comment:     Non   GFR Calc  Starting 12/18/2018, serum creatinine based estimated GFR (eGFR) will be   calculated using the Chronic Kidney Disease Epidemiology Collaboration   (CKD-EPI) equation.       Calcium   Date Value Ref Range Status   08/02/2023 9.8 8.8 - 10.2 mg/dL Final   08/31/2020 9.4 8.5 - 10.1 mg/dL Final     Bilirubin Total   Date Value Ref Range Status   08/02/2023 0.4 <=1.2 mg/dL Final   08/31/2020 0.4 0.2 - 1.3 mg/dL Final     Alkaline Phosphatase   Date Value Ref Range Status   08/02/2023 112 (H) 35 - 104 U/L Final   08/31/2020 140 40 - 150 U/L Final     ALT   Date Value Ref Range Status   08/02/2023 15 0 - 50 U/L Final     Comment:     Reference intervals for this test were updated on 6/12/2023 to more accurately reflect our healthy population. There may be differences in the flagging of prior results with similar values performed with this method. Interpretation of those prior results can be made in the context of the updated reference intervals.     08/31/2020 38 0 - 50 U/L Final     AST   Date Value Ref Range Status   08/02/2023 28 0 - 45 U/L Final     Comment:     Reference intervals for this test were updated on 6/12/2023 to more accurately reflect our healthy population. There may be differences in the flagging of prior results with similar values performed with this method. Interpretation of those prior results can be made in the context of the updated reference intervals.   08/31/2020 28 0 - 45 U/L Final       Assessment & Plan  69 y/o female with urinary urgency and urge incontinence as well as frequency and GUSM.  We discussed options of observation, continued medication, estrogen cream, PTNS, SNS, and botox.  She would like to try estrogen cream and SNS.  -estrace cream Rx   -schedule PNE with [facility]    Candi Palomino MD  [facility] UROLOGY CLINIC [place]

## 2024-04-24 NOTE — PROGRESS NOTES
Virtual Visit Details    Type of service:  Video Visit   Video Start Time: 2:21 PM  Video End Time:2:35 PM    Originating Location (pt. Location): Home    Distant Location (provider location):  Off-site  Platform used for Video Visit: Hugo

## 2024-05-01 ENCOUNTER — TELEPHONE (OUTPATIENT)
Dept: UROLOGY | Facility: CLINIC | Age: 69
End: 2024-05-01
Payer: COMMERCIAL

## 2024-05-01 NOTE — TELEPHONE ENCOUNTER
Called patient to schedule surgery/procedure with Dr. Palomino there was no answer, left voice message for patient to callback direct line to schedule.     Michaelle Shanks on 5/1/2024 at 3:26 PM

## 2024-05-06 DIAGNOSIS — E03.9 HYPOTHYROIDISM, UNSPECIFIED TYPE: ICD-10-CM

## 2024-05-06 DIAGNOSIS — E78.5 HYPERLIPIDEMIA LDL GOAL <100: ICD-10-CM

## 2024-05-06 RX ORDER — LEVOTHYROXINE SODIUM 150 UG/1
150 TABLET ORAL DAILY
Qty: 90 TABLET | Refills: 1 | OUTPATIENT
Start: 2024-05-06

## 2024-05-06 RX ORDER — ATORVASTATIN CALCIUM 80 MG/1
80 TABLET, FILM COATED ORAL DAILY
Qty: 90 TABLET | Refills: 1 | Status: SHIPPED | OUTPATIENT
Start: 2024-05-06 | End: 2024-09-25

## 2024-05-10 NOTE — TELEPHONE ENCOUNTER
Patient is having hip surgery in June, she wants to call and schedule sometime in August when she is ready, direct number provided.     Michaelle Shanks on 5/10/2024 at 11:01 AM

## 2024-06-27 DIAGNOSIS — N95.1 MENOPAUSAL SYNDROME (HOT FLASHES): ICD-10-CM

## 2024-06-27 RX ORDER — PAROXETINE 20 MG/1
20 TABLET, FILM COATED ORAL EVERY MORNING
Qty: 90 TABLET | Refills: 1 | Status: SHIPPED | OUTPATIENT
Start: 2024-06-27

## 2024-07-15 DIAGNOSIS — E03.9 HYPOTHYROIDISM, UNSPECIFIED TYPE: ICD-10-CM

## 2024-07-15 DIAGNOSIS — E78.5 HYPERLIPIDEMIA LDL GOAL <100: ICD-10-CM

## 2024-07-15 RX ORDER — LEVOTHYROXINE SODIUM 150 UG/1
150 TABLET ORAL DAILY
Qty: 90 TABLET | Refills: 0 | Status: SHIPPED | OUTPATIENT
Start: 2024-07-15

## 2024-07-15 RX ORDER — ATORVASTATIN CALCIUM 80 MG/1
80 TABLET, FILM COATED ORAL DAILY
Qty: 90 TABLET | Refills: 1 | OUTPATIENT
Start: 2024-07-15

## 2024-07-18 ENCOUNTER — MYC REFILL (OUTPATIENT)
Dept: FAMILY MEDICINE | Facility: CLINIC | Age: 69
End: 2024-07-18
Payer: COMMERCIAL

## 2024-07-18 DIAGNOSIS — R39.15 URINARY URGENCY: ICD-10-CM

## 2024-07-19 RX ORDER — FESOTERODINE FUMARATE 8 MG/1
8 TABLET, FILM COATED, EXTENDED RELEASE ORAL DAILY
Qty: 90 TABLET | Refills: 3 | Status: SHIPPED | OUTPATIENT
Start: 2024-07-19

## 2024-07-20 ENCOUNTER — MYC REFILL (OUTPATIENT)
Dept: FAMILY MEDICINE | Facility: CLINIC | Age: 69
End: 2024-07-20
Payer: COMMERCIAL

## 2024-07-20 DIAGNOSIS — E78.5 HYPERLIPIDEMIA LDL GOAL <100: ICD-10-CM

## 2024-07-22 RX ORDER — ATORVASTATIN CALCIUM 80 MG/1
80 TABLET, FILM COATED ORAL DAILY
Qty: 90 TABLET | Refills: 1 | OUTPATIENT
Start: 2024-07-22

## 2024-07-29 ENCOUNTER — MYC REFILL (OUTPATIENT)
Dept: FAMILY MEDICINE | Facility: CLINIC | Age: 69
End: 2024-07-29
Payer: COMMERCIAL

## 2024-07-29 DIAGNOSIS — E78.5 HYPERLIPIDEMIA LDL GOAL <100: ICD-10-CM

## 2024-07-29 RX ORDER — ATORVASTATIN CALCIUM 80 MG/1
80 TABLET, FILM COATED ORAL DAILY
Qty: 90 TABLET | Refills: 1 | OUTPATIENT
Start: 2024-07-29

## 2024-08-26 ENCOUNTER — E-VISIT (OUTPATIENT)
Dept: FAMILY MEDICINE | Facility: CLINIC | Age: 69
End: 2024-08-26
Payer: COMMERCIAL

## 2024-08-26 DIAGNOSIS — L30.9 DERMATITIS: Primary | ICD-10-CM

## 2024-08-26 PROCEDURE — 99421 OL DIG E/M SVC 5-10 MIN: CPT | Performed by: FAMILY MEDICINE

## 2024-08-26 RX ORDER — TRIAMCINOLONE ACETONIDE 1 MG/G
OINTMENT TOPICAL 2 TIMES DAILY
Qty: 80 G | Refills: 1 | Status: SHIPPED | OUTPATIENT
Start: 2024-08-26

## 2024-08-29 ENCOUNTER — PATIENT OUTREACH (OUTPATIENT)
Dept: CARE COORDINATION | Facility: CLINIC | Age: 69
End: 2024-08-29
Payer: COMMERCIAL

## 2024-09-08 DIAGNOSIS — E03.9 HYPOTHYROIDISM, UNSPECIFIED TYPE: ICD-10-CM

## 2024-09-09 RX ORDER — LEVOTHYROXINE SODIUM 150 UG/1
150 TABLET ORAL DAILY
Qty: 90 TABLET | Refills: 3 | OUTPATIENT
Start: 2024-09-09

## 2024-09-10 ENCOUNTER — TELEPHONE (OUTPATIENT)
Dept: FAMILY MEDICINE | Facility: CLINIC | Age: 69
End: 2024-09-10
Payer: COMMERCIAL

## 2024-09-10 NOTE — TELEPHONE ENCOUNTER
Home Care is calling regarding an established patient with [facility].        9/10/2024    11:11 AM   Home Care Information    Name/Phone Number [facility] [phone] option 1 for intake   Home Care agency [facility]     Requesting orders from: Noemi Mayer  Provider is following patient: No       Orders Requested    Skilled Nursing  Request for initial evaluation and treatment (one time) (first set of orders)   Frequency:     Information was gathered and will be sent to provider for review.  RN will contact Home Care with information after provider review.  Confirmed ok to leave a detailed message with call back.  Contact information confirmed and updated as needed.    Thanks,  Ingris DOWNEY RN

## 2024-09-11 NOTE — TELEPHONE ENCOUNTER
"I agree with home care referral. In order to place a \"written order\", the insurance requires an encounter with patient.  If they request a physical referral, contact patient and advise her to schedule a post hospital follow-up.     MD Leyla  "

## 2024-09-11 NOTE — TELEPHONE ENCOUNTER
Updated [facility]  Reports patient begins home care tomorrow  Using initial referral and orders from discharge location - [facility]    Would expect future written orders will be needed  Assisted patient with scheduling PCP hospital follow up appointment  Patient states she is doing okay and will call back if questions or concerns prior to appointment    Ingris Jimenez RN

## 2024-09-13 ENCOUNTER — TELEPHONE (OUTPATIENT)
Dept: FAMILY MEDICINE | Facility: CLINIC | Age: 69
End: 2024-09-13
Payer: COMMERCIAL

## 2024-09-13 NOTE — TELEPHONE ENCOUNTER
FS,  Please see below request for homecare orders.      Home Care is calling regarding an established patient with [facility].    Requesting orders from: Noemi Mayer  Provider is following patient: Yes  Is this a 60-day recertification request?  No    Orders Requested    Skilled Nursing  Request for initial certification (first set of orders)   Frequency:  one time a week for six weeks for PICC line management and lab draws    Confirmed ok to leave a detailed message with call back.  Contact information confirmed and updated as needed.    Callback number: [phone]    Nicole Barakat RN

## 2024-09-16 ENCOUNTER — MYC MEDICAL ADVICE (OUTPATIENT)
Dept: FAMILY MEDICINE | Facility: CLINIC | Age: 69
End: 2024-09-16

## 2024-09-16 ENCOUNTER — LAB REQUISITION (OUTPATIENT)
Dept: LAB | Facility: HOSPITAL | Age: 69
End: 2024-09-16
Payer: COMMERCIAL

## 2024-09-16 DIAGNOSIS — T84.51XA INFECTION AND INFLAMMATORY REACTION DUE TO INTERNAL RIGHT HIP PROSTHESIS, INITIAL ENCOUNTER (H): ICD-10-CM

## 2024-09-16 LAB
ALP SERPL-CCNC: 122 U/L (ref 40–150)
ALT SERPL W P-5'-P-CCNC: 8 U/L (ref 0–50)
BASOPHILS # BLD AUTO: 0 10E3/UL (ref 0–0.2)
BASOPHILS NFR BLD AUTO: 1 %
CREAT SERPL-MCNC: 0.49 MG/DL (ref 0.51–0.95)
EGFRCR SERPLBLD CKD-EPI 2021: >90 ML/MIN/1.73M2
EOSINOPHIL # BLD AUTO: 0.2 10E3/UL (ref 0–0.7)
EOSINOPHIL NFR BLD AUTO: 3 %
ERYTHROCYTE [DISTWIDTH] IN BLOOD BY AUTOMATED COUNT: 16.7 % (ref 10–15)
HCT VFR BLD AUTO: 25.3 % (ref 35–47)
HGB BLD-MCNC: 7.8 G/DL (ref 11.7–15.7)
IMM GRANULOCYTES # BLD: 0 10E3/UL
IMM GRANULOCYTES NFR BLD: 1 %
LYMPHOCYTES # BLD AUTO: 1.3 10E3/UL (ref 0.8–5.3)
LYMPHOCYTES NFR BLD AUTO: 16 %
MCH RBC QN AUTO: 28.3 PG (ref 26.5–33)
MCHC RBC AUTO-ENTMCNC: 30.8 G/DL (ref 31.5–36.5)
MCV RBC AUTO: 92 FL (ref 78–100)
MONOCYTES # BLD AUTO: 0.6 10E3/UL (ref 0–1.3)
MONOCYTES NFR BLD AUTO: 7 %
NEUTROPHILS # BLD AUTO: 5.7 10E3/UL (ref 1.6–8.3)
NEUTROPHILS NFR BLD AUTO: 73 %
NRBC # BLD AUTO: 0 10E3/UL
NRBC BLD AUTO-RTO: 0 /100
PLATELET # BLD AUTO: 564 10E3/UL (ref 150–450)
RBC # BLD AUTO: 2.76 10E6/UL (ref 3.8–5.2)
WBC # BLD AUTO: 7.8 10E3/UL (ref 4–11)

## 2024-09-16 PROCEDURE — 85025 COMPLETE CBC W/AUTO DIFF WBC: CPT | Mod: ORL

## 2024-09-16 PROCEDURE — 84075 ASSAY ALKALINE PHOSPHATASE: CPT | Mod: ORL

## 2024-09-16 PROCEDURE — 82565 ASSAY OF CREATININE: CPT | Mod: ORL

## 2024-09-16 PROCEDURE — 84460 ALANINE AMINO (ALT) (SGPT): CPT | Mod: ORL

## 2024-09-20 ENCOUNTER — OFFICE VISIT (OUTPATIENT)
Dept: FAMILY MEDICINE | Facility: CLINIC | Age: 69
End: 2024-09-20
Payer: COMMERCIAL

## 2024-09-20 VITALS
OXYGEN SATURATION: 100 % | DIASTOLIC BLOOD PRESSURE: 64 MMHG | HEIGHT: 64 IN | BODY MASS INDEX: 32.85 KG/M2 | SYSTOLIC BLOOD PRESSURE: 105 MMHG | RESPIRATION RATE: 18 BRPM | TEMPERATURE: 97.2 F | HEART RATE: 70 BPM | WEIGHT: 192.4 LBS

## 2024-09-20 DIAGNOSIS — Z95.828 STATUS POST PERIPHERALLY INSERTED CENTRAL CATHETER (PICC) CENTRAL LINE PLACEMENT: ICD-10-CM

## 2024-09-20 DIAGNOSIS — E66.01 MORBID OBESITY (H): ICD-10-CM

## 2024-09-20 DIAGNOSIS — R78.81 BACTEREMIA: Primary | ICD-10-CM

## 2024-09-20 DIAGNOSIS — T84.7XXA HARDWARE COMPLICATING WOUND INFECTION, INITIAL ENCOUNTER (H): ICD-10-CM

## 2024-09-20 DIAGNOSIS — E78.5 HYPERLIPIDEMIA LDL GOAL <100: ICD-10-CM

## 2024-09-20 DIAGNOSIS — T14.8XXA WOUND INFECTION: ICD-10-CM

## 2024-09-20 DIAGNOSIS — L08.9 WOUND INFECTION: ICD-10-CM

## 2024-09-20 DIAGNOSIS — E03.9 HYPOTHYROIDISM, UNSPECIFIED TYPE: ICD-10-CM

## 2024-09-20 LAB
ALBUMIN SERPL BCG-MCNC: 3 G/DL (ref 3.5–5.2)
ALP SERPL-CCNC: 125 U/L (ref 40–150)
ALT SERPL W P-5'-P-CCNC: <5 U/L (ref 0–50)
ANION GAP SERPL CALCULATED.3IONS-SCNC: 10 MMOL/L (ref 7–15)
AST SERPL W P-5'-P-CCNC: 30 U/L (ref 0–45)
BASOPHILS # BLD AUTO: 0 10E3/UL (ref 0–0.2)
BASOPHILS NFR BLD AUTO: 1 %
BILIRUB SERPL-MCNC: 0.3 MG/DL
BUN SERPL-MCNC: 10.2 MG/DL (ref 8–23)
CALCIUM SERPL-MCNC: 9.5 MG/DL (ref 8.8–10.4)
CHLORIDE SERPL-SCNC: 100 MMOL/L (ref 98–107)
CHOLEST SERPL-MCNC: 170 MG/DL
CREAT SERPL-MCNC: 0.67 MG/DL (ref 0.51–0.95)
EGFRCR SERPLBLD CKD-EPI 2021: >90 ML/MIN/1.73M2
EOSINOPHIL # BLD AUTO: 0.1 10E3/UL (ref 0–0.7)
EOSINOPHIL NFR BLD AUTO: 2 %
ERYTHROCYTE [DISTWIDTH] IN BLOOD BY AUTOMATED COUNT: 16.6 % (ref 10–15)
FASTING STATUS PATIENT QL REPORTED: YES
FASTING STATUS PATIENT QL REPORTED: YES
GLUCOSE SERPL-MCNC: 111 MG/DL (ref 70–99)
HCO3 SERPL-SCNC: 25 MMOL/L (ref 22–29)
HCT VFR BLD AUTO: 32.2 % (ref 35–47)
HDLC SERPL-MCNC: 41 MG/DL
HGB BLD-MCNC: 9.6 G/DL (ref 11.7–15.7)
IMM GRANULOCYTES # BLD: 0.1 10E3/UL
IMM GRANULOCYTES NFR BLD: 1 %
LDLC SERPL CALC-MCNC: 104 MG/DL
LYMPHOCYTES # BLD AUTO: 1 10E3/UL (ref 0.8–5.3)
LYMPHOCYTES NFR BLD AUTO: 11 %
MCH RBC QN AUTO: 28.1 PG (ref 26.5–33)
MCHC RBC AUTO-ENTMCNC: 29.8 G/DL (ref 31.5–36.5)
MCV RBC AUTO: 94 FL (ref 78–100)
MONOCYTES # BLD AUTO: 0.7 10E3/UL (ref 0–1.3)
MONOCYTES NFR BLD AUTO: 9 %
NEUTROPHILS # BLD AUTO: 6.7 10E3/UL (ref 1.6–8.3)
NEUTROPHILS NFR BLD AUTO: 78 %
NONHDLC SERPL-MCNC: 129 MG/DL
NRBC # BLD AUTO: 0 10E3/UL
NRBC BLD AUTO-RTO: 0 /100
PLATELET # BLD AUTO: 654 10E3/UL (ref 150–450)
POTASSIUM SERPL-SCNC: 4.5 MMOL/L (ref 3.4–5.3)
PROT SERPL-MCNC: 7.5 G/DL (ref 6.4–8.3)
RBC # BLD AUTO: 3.42 10E6/UL (ref 3.8–5.2)
SODIUM SERPL-SCNC: 135 MMOL/L (ref 135–145)
T4 FREE SERPL-MCNC: 0.82 NG/DL (ref 0.9–1.7)
TRIGL SERPL-MCNC: 126 MG/DL
TSH SERPL DL<=0.005 MIU/L-ACNC: 39.5 UIU/ML (ref 0.3–4.2)
WBC # BLD AUTO: 8.6 10E3/UL (ref 4–11)

## 2024-09-20 PROCEDURE — 80053 COMPREHEN METABOLIC PANEL: CPT | Performed by: FAMILY MEDICINE

## 2024-09-20 PROCEDURE — 85025 COMPLETE CBC W/AUTO DIFF WBC: CPT | Performed by: FAMILY MEDICINE

## 2024-09-20 PROCEDURE — G0008 ADMIN INFLUENZA VIRUS VAC: HCPCS | Performed by: FAMILY MEDICINE

## 2024-09-20 PROCEDURE — 99495 TRANSJ CARE MGMT MOD F2F 14D: CPT | Performed by: FAMILY MEDICINE

## 2024-09-20 PROCEDURE — 84439 ASSAY OF FREE THYROXINE: CPT | Performed by: FAMILY MEDICINE

## 2024-09-20 PROCEDURE — 90662 IIV NO PRSV INCREASED AG IM: CPT | Performed by: FAMILY MEDICINE

## 2024-09-20 PROCEDURE — 84443 ASSAY THYROID STIM HORMONE: CPT | Performed by: FAMILY MEDICINE

## 2024-09-20 PROCEDURE — 80061 LIPID PANEL: CPT | Performed by: FAMILY MEDICINE

## 2024-09-20 PROCEDURE — 36415 COLL VENOUS BLD VENIPUNCTURE: CPT | Performed by: FAMILY MEDICINE

## 2024-09-20 RX ORDER — TRAMADOL HYDROCHLORIDE 50 MG/1
50 TABLET ORAL
COMMUNITY
Start: 2024-09-07

## 2024-09-20 RX ORDER — CELECOXIB 200 MG/1
200 CAPSULE ORAL
COMMUNITY
Start: 2024-09-05 | End: 2024-10-09

## 2024-09-20 RX ORDER — ONDANSETRON 4 MG/1
4 TABLET, ORALLY DISINTEGRATING ORAL
COMMUNITY
Start: 2024-09-05

## 2024-09-20 RX ORDER — ASPIRIN 81 MG/1
81 TABLET ORAL
COMMUNITY
Start: 2024-09-05 | End: 2024-10-05

## 2024-09-20 RX ORDER — OXYCODONE HYDROCHLORIDE 5 MG/1
5 TABLET ORAL
COMMUNITY
Start: 2024-09-05

## 2024-09-20 RX ORDER — RIFAMPIN 300 MG/1
300 CAPSULE ORAL
COMMUNITY
Start: 2024-09-09 | End: 2024-12-03

## 2024-09-20 RX ORDER — CEFAZOLIN SODIUM 1 G/3ML
2 INJECTION, POWDER, FOR SOLUTION INTRAMUSCULAR; INTRAVENOUS EVERY 8 HOURS
COMMUNITY
Start: 2024-09-08 | End: 2024-10-16

## 2024-09-20 RX ORDER — PANTOPRAZOLE SODIUM 40 MG/1
40 TABLET, DELAYED RELEASE ORAL
COMMUNITY
Start: 2024-09-05 | End: 2024-10-09

## 2024-09-20 ASSESSMENT — PATIENT HEALTH QUESTIONNAIRE - PHQ9
10. IF YOU CHECKED OFF ANY PROBLEMS, HOW DIFFICULT HAVE THESE PROBLEMS MADE IT FOR YOU TO DO YOUR WORK, TAKE CARE OF THINGS AT HOME, OR GET ALONG WITH OTHER PEOPLE: VERY DIFFICULT
SUM OF ALL RESPONSES TO PHQ QUESTIONS 1-9: 9
SUM OF ALL RESPONSES TO PHQ QUESTIONS 1-9: 9

## 2024-09-20 ASSESSMENT — PAIN SCALES - GENERAL: PAINLEVEL: MILD PAIN (3)

## 2024-09-20 NOTE — PROGRESS NOTES
Assessment & Plan       ICD-10-CM    1. Bacteremia  R78.81       2. Hardware complicating wound infection, initial encounter (H24)  T84.7XXA       3. Status post peripherally inserted central catheter (PICC) central line placement  Z95.828       4. Wound infection  T14.8XXA CBC with platelets and differential    L08.9 Comprehensive metabolic panel (BMP + Alb, Alk Phos, ALT, AST, Total. Bili, TP)     CBC with platelets and differential     Comprehensive metabolic panel (BMP + Alb, Alk Phos, ALT, AST, Total. Bili, TP)      5. Morbid obesity (H)  E66.01       6. Hyperlipidemia LDL goal <100  E78.5 Lipid panel reflex to direct LDL Fasting     Lipid panel reflex to direct LDL Fasting      7. Hypothyroidism, unspecified type  E03.9 TSH WITH FREE T4 REFLEX     TSH WITH FREE T4 REFLEX        Nasreen is a pleasant 69-year-old who presented to the clinic for a post hospital follow-up. primary OA to R hip s/p R total hip arthroplasty 06/24/2024 by Dr. Ramos complicated by fall found to have periprosthetic femoral fracture s/p R total hip joint revision & ORIF 07/03/2024 by Dr. Ramos. Patient presented to [facility] on August 31, 2024 with a chief complaint of increased drainage and erythema.  She was noted to have a wound dehiscence and surgical site infection.  Evaluation at that time revealed bacteremia (staphylococci aureus) patient received 2 doses of vancomycin on September 1 and September 2 and decided to transfer her care to [facility].  Operative cultures from [facility] September 3, 2024 were also positive for staphylococci aureus.  She had a PICC line in place and was started on cefazolin and oral rifampin with a planned stop date of October 15, 2024.  Most recent complete blood count revealed anemia with a hemoglobin of 7.8 g/dL.  Repeat CBC was ordered today.  If hemoglobin is less than 7 I would recommend proceeding with a blood transfusion prior to her upcoming planned surgery at [facility] on  "Thursday, September 26, 2024.  Patient has a history of hypothyroidism and currently taking levothyroxine.  Previous TSH levels were within normal limits.  If current T is abnormal I would recommend repeating after 2 months.SH     OPAT plan:  Phase 1  IV cefazolin + oral rifampin-stop date 10/15/2024    Phase 2-beginning 10/16/2024  Oral levofloxacin and oral rifampin-stop date 12/03/2024    Phase 3-beginning 12/04/2024  Oral cefadroxil for long term suppression        MED REC REQUIRED  Post Medication Reconciliation Status:  Discharge medications reconciled, continue medications without change  BMI  Estimated body mass index is 33.03 kg/m  as calculated from the following:    Height as of this encounter: 1.626 m (5' 4\").    Weight as of this encounter: 87.3 kg (192 lb 6.4 oz).       Woodrow Downey is a 69 year old, presenting for the following health issues:  Hospital F/U        1/5/2024     9:14 AM   Additional Questions   Roomed by Ammy ENRIQUE     [facility]     Hospital Follow-up Visit:    Hospital/Nursing Home/IP Rehab Facility:  [place], [place]  Date of Admission: 9/2/24  Date of Discharge: 9/9/24  Reason(s) for Admission: Infection Total Hip Arthroplasty Right  Was the patient in the ICU or did the patient experience delirium during hospitalization?  No  Do you have any other stressors you would like to discuss with your provider? Job Concerns    Problems taking medications regularly:  None  Medication changes since discharge: Cefazolin IV, Oxycodone PRN, Tramadol, Aspirin 81mg  Problems adhering to non-medication therapy:  None    Summary of hospitalization:  [facility] discharge summary reviewed  Diagnostic Tests/Treatments reviewed.  Follow up needed: none  Other Healthcare Providers Involved in Patient s Care:         None  Update since discharge: improvedKings Downey is a 69-year-old female w/ a PMH significant for HTN, HLD, hypothyroidism, GERD, migraines, morbid obesity, " "anxiety, & primary OA to R hip s/p R total hip arthroplasty 06/24/2024 by Dr. Ramos complicated by fall found to have periprosthetic femoral fracture s/p R total hip joint revision & ORIF 07/03/2024 by Dr. Ramos. Patient presented to [facility] on August 31, 2024 with a chief complaint of increased drainage and erythema.  She was noted to have a wound dehiscence and surgical site infection.  Evaluation at that time revealed bacteremia (staphylococci aureus) patient received 2 doses of vancomycin on September 1 and September 2 and decided to transfer her care to [facility].  Operative cultures from [facility] September 3, 2024 were also positive for staphylococci aureus.  She had a PICC line in place and was started on cefazolin and oral rifampin with a planned stop date of October 15, 2024.    Phase 1  IV cefazolin + oral rifampin-stop date 10/15/2024    Phase 2-beginning 10/16/2024  Oral levofloxacin and oral rifampin-stop date 12/03/2024    Phase 3-beginning 12/04/2024  Oral cefadroxil for long term suppression     In today's visit, patient reports improvement in pain control after taking 2 tablets of oxycodone earlier today.  Drainage decreased in the last 2 days.  She continues to notice watery bloody drainage without an odor.  Currently ambulating with a walker.    Review of Systems  Constitutional, HEENT, cardiovascular, pulmonary, gi and gu systems are negative, except as otherwise noted.      Objective    /64   Pulse 70   Temp 97.2  F (36.2  C) (Temporal)   Resp 18   Ht 1.626 m (5' 4\")   Wt 87.3 kg (192 lb 6.4 oz)   LMP  (LMP Unknown)   SpO2 100%   BMI 33.03 kg/m    Body mass index is 33.03 kg/m .  Physical Exam   GENERAL: alert and no distress  NECK: no adenopathy, no asymmetry, masses, or scars  RESP: lungs clear to auscultation - no rales, rhonchi or wheezes  CV: regular rate and rhythm, normal S1 S2, no S3 or S4, no murmur, click or rub, no peripheral edema  ABDOMEN: soft, " nontender, no hepatosplenomegaly, no masses and bowel sounds normal  MS: no gross musculoskeletal defects noted, no edema    Results for orders placed or performed in visit on 09/20/24   CBC with platelets and differential     Status: None (In process)    Narrative    The following orders were created for panel order CBC with platelets and differential.  Procedure                               Abnormality         Status                     ---------                               -----------         ------                     CBC with platelets and d...[[id number]]                      In process                   Please view results for these tests on the individual orders.         Signed Electronically by: Noemi Mayer MD

## 2024-09-23 ENCOUNTER — LAB REQUISITION (OUTPATIENT)
Dept: LAB | Facility: HOSPITAL | Age: 69
End: 2024-09-23
Payer: COMMERCIAL

## 2024-09-23 DIAGNOSIS — T84.51XA INFECTION AND INFLAMMATORY REACTION DUE TO INTERNAL RIGHT HIP PROSTHESIS, INITIAL ENCOUNTER (H): ICD-10-CM

## 2024-09-23 LAB
ALP SERPL-CCNC: 121 U/L (ref 40–150)
ALT SERPL W P-5'-P-CCNC: <5 U/L (ref 0–50)
BASOPHILS # BLD AUTO: 0 10E3/UL (ref 0–0.2)
BASOPHILS NFR BLD AUTO: 0 %
CREAT SERPL-MCNC: 0.56 MG/DL (ref 0.51–0.95)
EGFRCR SERPLBLD CKD-EPI 2021: >90 ML/MIN/1.73M2
EOSINOPHIL # BLD AUTO: 0.3 10E3/UL (ref 0–0.7)
EOSINOPHIL NFR BLD AUTO: 5 %
ERYTHROCYTE [DISTWIDTH] IN BLOOD BY AUTOMATED COUNT: 15.9 % (ref 10–15)
HCT VFR BLD AUTO: 27.6 % (ref 35–47)
HGB BLD-MCNC: 8.5 G/DL (ref 11.7–15.7)
IMM GRANULOCYTES # BLD: 0 10E3/UL
IMM GRANULOCYTES NFR BLD: 0 %
LYMPHOCYTES # BLD AUTO: 1.2 10E3/UL (ref 0.8–5.3)
LYMPHOCYTES NFR BLD AUTO: 21 %
MCH RBC QN AUTO: 27.9 PG (ref 26.5–33)
MCHC RBC AUTO-ENTMCNC: 30.8 G/DL (ref 31.5–36.5)
MCV RBC AUTO: 91 FL (ref 78–100)
MONOCYTES # BLD AUTO: 0.5 10E3/UL (ref 0–1.3)
MONOCYTES NFR BLD AUTO: 10 %
NEUTROPHILS # BLD AUTO: 3.5 10E3/UL (ref 1.6–8.3)
NEUTROPHILS NFR BLD AUTO: 64 %
NRBC # BLD AUTO: 0 10E3/UL
NRBC BLD AUTO-RTO: 0 /100
PLATELET # BLD AUTO: 436 10E3/UL (ref 150–450)
RBC # BLD AUTO: 3.05 10E6/UL (ref 3.8–5.2)
WBC # BLD AUTO: 5.5 10E3/UL (ref 4–11)

## 2024-09-23 PROCEDURE — 84460 ALANINE AMINO (ALT) (SGPT): CPT | Mod: ORL | Performed by: ORTHOPAEDIC SURGERY

## 2024-09-23 PROCEDURE — 85025 COMPLETE CBC W/AUTO DIFF WBC: CPT | Mod: ORL | Performed by: ORTHOPAEDIC SURGERY

## 2024-09-23 PROCEDURE — 84075 ASSAY ALKALINE PHOSPHATASE: CPT | Mod: ORL | Performed by: ORTHOPAEDIC SURGERY

## 2024-09-23 PROCEDURE — 82565 ASSAY OF CREATININE: CPT | Mod: ORL | Performed by: ORTHOPAEDIC SURGERY

## 2024-09-24 DIAGNOSIS — E78.5 HYPERLIPIDEMIA LDL GOAL <100: ICD-10-CM

## 2024-09-25 RX ORDER — ATORVASTATIN CALCIUM 80 MG/1
80 TABLET, FILM COATED ORAL DAILY
Qty: 80 TABLET | Refills: 3 | Status: SHIPPED | OUTPATIENT
Start: 2024-09-25

## 2024-09-25 NOTE — RESULT ENCOUNTER NOTE
Dear Nasreen,   Good news! Your blood counts are improving.   Your thyroid test (TSH) is exteremly elevated. This is likely secondary to your current medications. Let's keep you on the same dose of levothyroxine and recheck it in 2-3 months.    Protein level is low. You had multiple hospitalizations and procedures in the last few months, I'm assuming that your diet was not the best at that time. Please increase your protein intake.       All other labs are within expected ranges.     Best Regards  Noemi Mayer MD

## 2024-09-26 ENCOUNTER — PATIENT OUTREACH (OUTPATIENT)
Dept: CARE COORDINATION | Facility: CLINIC | Age: 69
End: 2024-09-26
Payer: COMMERCIAL

## 2024-10-09 ENCOUNTER — LAB REQUISITION (OUTPATIENT)
Dept: LAB | Facility: HOSPITAL | Age: 69
End: 2024-10-09
Payer: COMMERCIAL

## 2024-10-09 DIAGNOSIS — T84.51XA INFECTION AND INFLAMMATORY REACTION DUE TO INTERNAL RIGHT HIP PROSTHESIS, INITIAL ENCOUNTER (H): ICD-10-CM

## 2024-10-09 LAB
ALP SERPL-CCNC: 129 U/L (ref 40–150)
ALT SERPL W P-5'-P-CCNC: <5 U/L (ref 0–50)
BASOPHILS # BLD AUTO: 0 10E3/UL (ref 0–0.2)
BASOPHILS NFR BLD AUTO: 1 %
CREAT SERPL-MCNC: 0.66 MG/DL (ref 0.51–0.95)
EGFRCR SERPLBLD CKD-EPI 2021: >90 ML/MIN/1.73M2
EOSINOPHIL # BLD AUTO: 0.2 10E3/UL (ref 0–0.7)
EOSINOPHIL NFR BLD AUTO: 3 %
ERYTHROCYTE [DISTWIDTH] IN BLOOD BY AUTOMATED COUNT: 14.6 % (ref 10–15)
HCT VFR BLD AUTO: 28.6 % (ref 35–47)
HGB BLD-MCNC: 8.8 G/DL (ref 11.7–15.7)
IMM GRANULOCYTES # BLD: 0 10E3/UL
IMM GRANULOCYTES NFR BLD: 1 %
LYMPHOCYTES # BLD AUTO: 1.3 10E3/UL (ref 0.8–5.3)
LYMPHOCYTES NFR BLD AUTO: 17 %
MCH RBC QN AUTO: 27.8 PG (ref 26.5–33)
MCHC RBC AUTO-ENTMCNC: 30.8 G/DL (ref 31.5–36.5)
MCV RBC AUTO: 91 FL (ref 78–100)
MONOCYTES # BLD AUTO: 0.6 10E3/UL (ref 0–1.3)
MONOCYTES NFR BLD AUTO: 8 %
NEUTROPHILS # BLD AUTO: 5.3 10E3/UL (ref 1.6–8.3)
NEUTROPHILS NFR BLD AUTO: 71 %
NRBC # BLD AUTO: 0 10E3/UL
NRBC BLD AUTO-RTO: 0 /100
PLATELET # BLD AUTO: 419 10E3/UL (ref 150–450)
RBC # BLD AUTO: 3.16 10E6/UL (ref 3.8–5.2)
WBC # BLD AUTO: 7.5 10E3/UL (ref 4–11)

## 2024-10-09 PROCEDURE — 85025 COMPLETE CBC W/AUTO DIFF WBC: CPT | Mod: ORL | Performed by: ORTHOPAEDIC SURGERY

## 2024-10-09 PROCEDURE — 82565 ASSAY OF CREATININE: CPT | Mod: ORL | Performed by: ORTHOPAEDIC SURGERY

## 2024-10-09 PROCEDURE — 84460 ALANINE AMINO (ALT) (SGPT): CPT | Mod: ORL | Performed by: ORTHOPAEDIC SURGERY

## 2024-10-09 PROCEDURE — 84075 ASSAY ALKALINE PHOSPHATASE: CPT | Mod: ORL | Performed by: ORTHOPAEDIC SURGERY

## 2024-10-10 ENCOUNTER — TELEPHONE (OUTPATIENT)
Dept: FAMILY MEDICINE | Facility: CLINIC | Age: 69
End: 2024-10-10
Payer: COMMERCIAL

## 2024-10-10 NOTE — TELEPHONE ENCOUNTER
Forms/Letter Request    Type of form/letter: Home Health Certification and Plan Of Care 9/12/24--11/10/24        Do we have the form/letter: Yes:     Who is the form from? [facility]    Where did/will the form come from? form was faxed in    When is form/letter needed by: asap    How would you like the form/letter returned: Fax : [phone]

## 2024-10-14 ENCOUNTER — TELEPHONE (OUTPATIENT)
Dept: FAMILY MEDICINE | Facility: CLINIC | Age: 69
End: 2024-10-14
Payer: COMMERCIAL

## 2024-10-14 DIAGNOSIS — T84.7XXD HARDWARE COMPLICATING WOUND INFECTION, SUBSEQUENT ENCOUNTER: Primary | ICD-10-CM

## 2024-10-14 RX ORDER — MICAFUNGIN 20 MG/ML
100 INJECTION, POWDER, LYOPHILIZED, FOR SOLUTION INTRAVENOUS EVERY 24 HOURS
Status: CANCELLED
Start: 2024-10-14

## 2024-10-14 RX ORDER — MICAFUNGIN 20 MG/ML
100 INJECTION, POWDER, LYOPHILIZED, FOR SOLUTION INTRAVENOUS EVERY 24 HOURS
COMMUNITY
Start: 2024-10-14 | End: 2024-11-12

## 2024-10-14 NOTE — TELEPHONE ENCOUNTER
Mariah calling from [facility]  Requesting if PCP would be willing to co-sign one time IV Micafungin order  Patient has been seen at [facility]  Fluconazole was discontinued due to dizziness/fall   Micafungin recommended instead  Patient prefers to complete in [place] area instead of [facility] talked to [facility] infusion [place] who stated an 'in house provider needs to co-sign orders so patient can receive these medications at [place]'  [facility] will follow labs and manage care for IV Micafungin one time dose of 100mg at [facility] [place]  The patient would be monitored for the first dose to ensure no infusion reactions  Option "[facility] [facility] in [place] will be providing home care/infusion for any future doses if tolerated niko    [facility] has Epic - writer pulled micafungin medication and [facility] information through on care everywhere     Please advise if PCP would approve of cosign   [place] could send any records needed for PCP review     If signed or needing further information please call Richar Lemus RN back   Call back number for Nursing and Infectious Diseases: [phone]    Thanks,  Ingris DOWNEY RN

## 2024-10-14 NOTE — TELEPHONE ENCOUNTER
I tried to put an infusion order for micafungin (MYCAMINE) 100 MG IV infusion but it was not able to find it in the infusion list. I'm willing to sign her orders but I'm unable to add it.   Can you please contact the infusion center and advise me on how to add it.       MD Leyla

## 2024-10-15 ENCOUNTER — TELEPHONE (OUTPATIENT)
Dept: FAMILY MEDICINE | Facility: CLINIC | Age: 69
End: 2024-10-15
Payer: COMMERCIAL

## 2024-10-15 PROBLEM — T84.7XXA HARDWARE COMPLICATING WOUND INFECTION (H): Status: ACTIVE | Noted: 2024-10-15

## 2024-10-15 RX ORDER — MEPERIDINE HYDROCHLORIDE 25 MG/ML
25 INJECTION INTRAMUSCULAR; INTRAVENOUS; SUBCUTANEOUS EVERY 30 MIN PRN
Status: CANCELLED | OUTPATIENT
Start: 2024-10-15

## 2024-10-15 RX ORDER — DIPHENHYDRAMINE HYDROCHLORIDE 50 MG/ML
50 INJECTION INTRAMUSCULAR; INTRAVENOUS
Status: CANCELLED
Start: 2024-10-15

## 2024-10-15 RX ORDER — ALBUTEROL SULFATE 90 UG/1
1-2 INHALANT RESPIRATORY (INHALATION)
Status: CANCELLED
Start: 2024-10-15

## 2024-10-15 RX ORDER — METHYLPREDNISOLONE SODIUM SUCCINATE 125 MG/2ML
125 INJECTION INTRAMUSCULAR; INTRAVENOUS
Status: CANCELLED
Start: 2024-10-15

## 2024-10-15 RX ORDER — HEPARIN SODIUM (PORCINE) LOCK FLUSH IV SOLN 100 UNIT/ML 100 UNIT/ML
5 SOLUTION INTRAVENOUS
Status: CANCELLED | OUTPATIENT
Start: 2024-10-15

## 2024-10-15 RX ORDER — ALBUTEROL SULFATE 0.83 MG/ML
2.5 SOLUTION RESPIRATORY (INHALATION)
Status: CANCELLED | OUTPATIENT
Start: 2024-10-15

## 2024-10-15 RX ORDER — HEPARIN SODIUM,PORCINE 10 UNIT/ML
5-20 VIAL (ML) INTRAVENOUS DAILY PRN
Status: CANCELLED | OUTPATIENT
Start: 2024-10-15

## 2024-10-15 RX ORDER — EPINEPHRINE 1 MG/ML
0.3 INJECTION, SOLUTION, CONCENTRATE INTRAVENOUS EVERY 5 MIN PRN
Status: CANCELLED | OUTPATIENT
Start: 2024-10-15

## 2024-10-15 NOTE — TELEPHONE ENCOUNTER
Markus, [facility] also updated that order was placed, as requested  Thanks,  Ingris DOWNEY RN

## 2024-10-15 NOTE — TELEPHONE ENCOUNTER
Called the infusion center and entered the recommended generic adult infusion plan with micafungin (MYCAMINE) 100 MG IV infusion.    Please provide Nasreen with the infusion center number to schedule her appointment  MD Leyla

## 2024-10-15 NOTE — TELEPHONE ENCOUNTER
Markus calling back from the [facility] call stating that PCP can call the  ITC pharmacist at the [facility] to get assistance in ordering.  Their number is [phone].       Please call Markus back at [phone] once medication has been order. Thanks    Marci French RN  [facility]

## 2024-10-15 NOTE — TELEPHONE ENCOUNTER
Markus from [facility] ID calling back to get an update on this. Relayed message from Dr. Mayer. She is going to try to call the infusion center and call back.    Marguerite Carrillo RN

## 2024-10-16 ENCOUNTER — LAB REQUISITION (OUTPATIENT)
Dept: LAB | Facility: HOSPITAL | Age: 69
End: 2024-10-16
Payer: COMMERCIAL

## 2024-10-16 DIAGNOSIS — T84.51XA INFECTION AND INFLAMMATORY REACTION DUE TO INTERNAL RIGHT HIP PROSTHESIS, INITIAL ENCOUNTER: ICD-10-CM

## 2024-10-16 DIAGNOSIS — B95.8 UNSPECIFIED STAPHYLOCOCCUS AS THE CAUSE OF DISEASES CLASSIFIED ELSEWHERE: ICD-10-CM

## 2024-10-16 LAB
ALP SERPL-CCNC: 151 U/L (ref 40–150)
ALT SERPL W P-5'-P-CCNC: 7 U/L (ref 0–50)
BASOPHILS # BLD AUTO: 0 10E3/UL (ref 0–0.2)
BASOPHILS NFR BLD AUTO: 1 %
CREAT SERPL-MCNC: 0.81 MG/DL (ref 0.51–0.95)
EGFRCR SERPLBLD CKD-EPI 2021: 78 ML/MIN/1.73M2
EOSINOPHIL # BLD AUTO: 0.2 10E3/UL (ref 0–0.7)
EOSINOPHIL NFR BLD AUTO: 4 %
ERYTHROCYTE [DISTWIDTH] IN BLOOD BY AUTOMATED COUNT: 14.2 % (ref 10–15)
HCT VFR BLD AUTO: 29 % (ref 35–47)
HGB BLD-MCNC: 8.8 G/DL (ref 11.7–15.7)
IMM GRANULOCYTES # BLD: 0 10E3/UL
IMM GRANULOCYTES NFR BLD: 0 %
LYMPHOCYTES # BLD AUTO: 0.8 10E3/UL (ref 0.8–5.3)
LYMPHOCYTES NFR BLD AUTO: 22 %
MCH RBC QN AUTO: 27.2 PG (ref 26.5–33)
MCHC RBC AUTO-ENTMCNC: 30.3 G/DL (ref 31.5–36.5)
MCV RBC AUTO: 90 FL (ref 78–100)
MONOCYTES # BLD AUTO: 0.5 10E3/UL (ref 0–1.3)
MONOCYTES NFR BLD AUTO: 14 %
NEUTROPHILS # BLD AUTO: 2.2 10E3/UL (ref 1.6–8.3)
NEUTROPHILS NFR BLD AUTO: 59 %
NRBC # BLD AUTO: 0 10E3/UL
NRBC BLD AUTO-RTO: 0 /100
PLATELET # BLD AUTO: 357 10E3/UL (ref 150–450)
RBC # BLD AUTO: 3.23 10E6/UL (ref 3.8–5.2)
WBC # BLD AUTO: 3.7 10E3/UL (ref 4–11)

## 2024-10-16 PROCEDURE — 84460 ALANINE AMINO (ALT) (SGPT): CPT | Mod: ORL | Performed by: FAMILY MEDICINE

## 2024-10-16 PROCEDURE — 84075 ASSAY ALKALINE PHOSPHATASE: CPT | Mod: ORL | Performed by: FAMILY MEDICINE

## 2024-10-16 PROCEDURE — 82565 ASSAY OF CREATININE: CPT | Mod: ORL | Performed by: FAMILY MEDICINE

## 2024-10-16 PROCEDURE — 85025 COMPLETE CBC W/AUTO DIFF WBC: CPT | Mod: ORL | Performed by: FAMILY MEDICINE

## 2024-10-18 ENCOUNTER — TELEPHONE (OUTPATIENT)
Dept: ONCOLOGY | Facility: HOSPITAL | Age: 69
End: 2024-10-18
Payer: COMMERCIAL

## 2024-10-18 NOTE — TELEPHONE ENCOUNTER
I was able to speak with Nasreen and she identified herself using full name and . I asked if she knew what was causing the dizziness and she said she assumed it was from orthostatic hypotension. In her previous message she had mentioned [facility] and I asked if she was interested in [facility] for her missed infusion or for support for the hypotension and she said for the missed infusion. I told her I did not know if they would be able to see her [facility] orders and if they do med infusions or just support like blood products, etc. I told her I would be more than happy to call and find out then call her back. She thanked me for that. COTY Rothman RN, OCN, CBCN      I called [facility] and they were full this weekend. They advised to try the [facility]. And gave me the number.     I called Nasreen back again and she did not answer so I had to leave a message. [facility] has openings in infusion this weekend. Contact information left in the message

## 2024-10-18 NOTE — TELEPHONE ENCOUNTER
I called Nasreen and was able to speak with her and she said she rec'd the VM I left her and she will make the appt. I told her that her orders were in EPIC so there should not be a problem seeing them. She thanked me for checking. COTY Rothman RN, OCN, CBCN

## 2024-10-20 NOTE — RESULT ENCOUNTER NOTE
Dear Nasreen,   Here are your recent results  Your white blood cell count and hemoglobin are stable.  Continue to work with your team at [facility].    Best Regards  Noemi Mayer MD

## 2024-10-23 ENCOUNTER — LAB REQUISITION (OUTPATIENT)
Dept: LAB | Facility: HOSPITAL | Age: 69
End: 2024-10-23
Payer: COMMERCIAL

## 2024-10-23 DIAGNOSIS — T84.51XA INFECTION AND INFLAMMATORY REACTION DUE TO INTERNAL RIGHT HIP PROSTHESIS, INITIAL ENCOUNTER (H): ICD-10-CM

## 2024-10-23 LAB
ALP SERPL-CCNC: 145 U/L (ref 40–150)
ALT SERPL W P-5'-P-CCNC: <5 U/L (ref 0–50)
BASOPHILS # BLD AUTO: 0 10E3/UL (ref 0–0.2)
BASOPHILS NFR BLD AUTO: 1 %
CREAT SERPL-MCNC: 0.63 MG/DL (ref 0.51–0.95)
EGFRCR SERPLBLD CKD-EPI 2021: >90 ML/MIN/1.73M2
EOSINOPHIL # BLD AUTO: 0.3 10E3/UL (ref 0–0.7)
EOSINOPHIL NFR BLD AUTO: 5 %
ERYTHROCYTE [DISTWIDTH] IN BLOOD BY AUTOMATED COUNT: 14.1 % (ref 10–15)
HCT VFR BLD AUTO: 29.3 % (ref 35–47)
HGB BLD-MCNC: 9.2 G/DL (ref 11.7–15.7)
IMM GRANULOCYTES # BLD: 0 10E3/UL
IMM GRANULOCYTES NFR BLD: 0 %
LYMPHOCYTES # BLD AUTO: 1.2 10E3/UL (ref 0.8–5.3)
LYMPHOCYTES NFR BLD AUTO: 21 %
MCH RBC QN AUTO: 27.4 PG (ref 26.5–33)
MCHC RBC AUTO-ENTMCNC: 31.4 G/DL (ref 31.5–36.5)
MCV RBC AUTO: 87 FL (ref 78–100)
MONOCYTES # BLD AUTO: 0.6 10E3/UL (ref 0–1.3)
MONOCYTES NFR BLD AUTO: 10 %
NEUTROPHILS # BLD AUTO: 3.5 10E3/UL (ref 1.6–8.3)
NEUTROPHILS NFR BLD AUTO: 63 %
NRBC # BLD AUTO: 0 10E3/UL
NRBC BLD AUTO-RTO: 0 /100
PLATELET # BLD AUTO: 387 10E3/UL (ref 150–450)
RBC # BLD AUTO: 3.36 10E6/UL (ref 3.8–5.2)
WBC # BLD AUTO: 5.5 10E3/UL (ref 4–11)

## 2024-10-23 PROCEDURE — 85025 COMPLETE CBC W/AUTO DIFF WBC: CPT | Mod: ORL | Performed by: ORTHOPAEDIC SURGERY

## 2024-10-23 PROCEDURE — 82565 ASSAY OF CREATININE: CPT | Mod: ORL | Performed by: ORTHOPAEDIC SURGERY

## 2024-10-23 PROCEDURE — 84460 ALANINE AMINO (ALT) (SGPT): CPT | Mod: ORL | Performed by: ORTHOPAEDIC SURGERY

## 2024-10-23 PROCEDURE — 84075 ASSAY ALKALINE PHOSPHATASE: CPT | Mod: ORL | Performed by: ORTHOPAEDIC SURGERY

## 2024-10-24 ENCOUNTER — VIRTUAL VISIT (OUTPATIENT)
Dept: FAMILY MEDICINE | Facility: CLINIC | Age: 69
End: 2024-10-24
Payer: COMMERCIAL

## 2024-10-24 DIAGNOSIS — I95.1 ORTHOSTATIC HYPOTENSION: Primary | ICD-10-CM

## 2024-10-24 PROCEDURE — 99213 OFFICE O/P EST LOW 20 MIN: CPT | Mod: 95 | Performed by: FAMILY MEDICINE

## 2024-10-24 NOTE — PROGRESS NOTES
"Nasreen is a 69 year old who is being evaluated via a billable video visit.    How would you like to obtain your AVS? MyChart  If the video visit is dropped, the invitation should be resent by: Text to cell phone: [phone]  Will anyone else be joining your video visit? No    Assessment & Plan   Diagnoses and all orders for this visit:    Orthostatic hypotension  Mary 69-year-old with history of low blood pressures who scheduled a virtual visit to discuss recurrent episodes of dizziness triggered by orthostatic hypotension.  We reviewed her current medication list and patient is not taking any medications causing hypotension.  I advised her to increase her salt intake and continue to monitor her blood pressure at home.  I also advised her to proceed to the emergency room if symptoms fail to improve.  Comprehensive metabolic panel ordered to rule out any electrolyte abnormalities causing her symptoms.  -     Comprehensive metabolic panel (BMP + Alb, Alk Phos, ALT, AST, Total. Bili, TP); Future    Follow-up:  Return if symptoms worsen or fail to improve.      Post Medication Reconciliation Status:  Discharge medications reconciled, continue medications without change  BMI  Estimated body mass index is 33.03 kg/m  as calculated from the following:    Height as of 9/20/24: 1.626 m (5' 4\").    Weight as of 9/20/24: 87.3 kg (192 lb 6.4 oz).       Subjective   Nasreen is a 69 year old, presenting for the following health issues:  No chief complaint on file.        10/24/2024    11:20 AM   Additional Questions   Roomed by Chele GOODWIN     Video Start Time: 12:05 PM    [facility]     ED/UC Followup:    Facility:  [facility]  Date of visit: 09/25-10/03  Reason for visit: Hip  Current Status: Ronny Hernandez 69-year-old with primary osteoarthritis of the right hip status post right total hip arthroplasty on June 24, 2024.  Patient had a complex recovery postsurgery with multiple complications. (Please see Office note from September 20, 2024 " "for details).  Patient is currently recovering at home with her son being the primary caregiver.  On October 11, 2024, patient had a sudden syncopal episode in the bathroom and recalls hitting her head on her way down.  She recalls loss of consciousness for a few minutes but unsure regarding the exact duration.  She recalls waking up sitting on the bathroom floor.  Patient denies any symptoms prior to her syncopal episode.  Denies any chest pain, difficulty breathing or palpitations.  EMS was called and she was evaluated at home.  Vitals were stable at the time of the evaluation and they mutually agreed to proceed to the emergency room for evaluation.  Patient states that her initial blood pressure was 90/46 and when they rechecked her blood pressure was normal.    Since her syncopal episode on October 11, 2024, patient reports having another \"dizzy spell \".  Describes it as feeling as if the room is spinning around her.  She denies any weakness numbness or tingling.  Denies any headaches.  Sensation of dizziness is triggered by sudden changes in position.      Review of Systems  Constitutional, neuro, ENT, endocrine, pulmonary, cardiac, gastrointestinal, genitourinary, musculoskeletal, integument and psychiatric systems are negative, except as otherwise noted.      Objective           Vitals:  No vitals were obtained today due to virtual visit.    Physical Exam   GENERAL: alert and no distress  EYES: Eyes grossly normal to inspection.  No discharge or erythema, or obvious scleral/conjunctival abnormalities.  RESP: No audible wheeze, cough, or visible cyanosis.    SKIN: Visible skin clear. No significant rash, abnormal pigmentation or lesions.  NEURO: Cranial nerves grossly intact.  Mentation and speech appropriate for age.  PSYCH: Appropriate affect, tone, and pace of words    Lab Requisition on 10/23/2024   Component Date Value Ref Range Status    ALT 10/23/2024 <5  0 - 50 U/L Final    Creatinine 10/23/2024 0.63  " 0.51 - 0.95 mg/dL Final    GFR Estimate 10/23/2024 >90  >60 mL/min/1.73m2 Final    eGFR calculated using 2021 CKD-EPI equation.    Alkaline Phosphatase 10/23/2024 145  40 - 150 U/L Final    WBC Count 10/23/2024 5.5  4.0 - 11.0 10e3/uL Final    RBC Count 10/23/2024 3.36 (L)  3.80 - 5.20 10e6/uL Final    Hemoglobin 10/23/2024 9.2 (L)  11.7 - 15.7 g/dL Final    Hematocrit 10/23/2024 29.3 (L)  35.0 - 47.0 % Final    MCV 10/23/2024 87  78 - 100 fL Final    MCH 10/23/2024 27.4  26.5 - 33.0 pg Final    MCHC 10/23/2024 31.4 (L)  31.5 - 36.5 g/dL Final    RDW 10/23/2024 14.1  10.0 - 15.0 % Final    Platelet Count 10/23/2024 387  150 - 450 10e3/uL Final    % Neutrophils 10/23/2024 63  % Final    % Lymphocytes 10/23/2024 21  % Final    % Monocytes 10/23/2024 10  % Final    % Eosinophils 10/23/2024 5  % Final    % Basophils 10/23/2024 1  % Final    % Immature Granulocytes 10/23/2024 0  % Final    NRBCs per 100 WBC 10/23/2024 0  <1 /100 Final    Absolute Neutrophils 10/23/2024 3.5  1.6 - 8.3 10e3/uL Final    Absolute Lymphocytes 10/23/2024 1.2  0.8 - 5.3 10e3/uL Final    Absolute Monocytes 10/23/2024 0.6  0.0 - 1.3 10e3/uL Final    Absolute Eosinophils 10/23/2024 0.3  0.0 - 0.7 10e3/uL Final    Absolute Basophils 10/23/2024 0.0  0.0 - 0.2 10e3/uL Final    Absolute Immature Granulocytes 10/23/2024 0.0  <=0.4 10e3/uL Final    Absolute NRBCs 10/23/2024 0.0  10e3/uL Final         Video-Visit Details    Type of service:  Video Visit   Video End Time:12:31 PM  Originating Location (pt. Location): Home  Distant Location (provider location):  Off-site  Platform used for Video Visit: Hugo  Signed Electronically by: Noemi Mayer MD

## 2024-10-28 RX ORDER — DIPHENHYDRAMINE HYDROCHLORIDE 50 MG/ML
25 INJECTION INTRAMUSCULAR; INTRAVENOUS
Status: CANCELLED
Start: 2024-10-28

## 2024-10-28 RX ORDER — METHYLPREDNISOLONE SODIUM SUCCINATE 40 MG/ML
40 INJECTION INTRAMUSCULAR; INTRAVENOUS
Status: CANCELLED
Start: 2024-10-28

## 2024-10-28 RX ORDER — ALBUTEROL SULFATE 0.83 MG/ML
2.5 SOLUTION RESPIRATORY (INHALATION)
Status: CANCELLED | OUTPATIENT
Start: 2024-10-28

## 2024-10-28 RX ORDER — EPINEPHRINE 1 MG/ML
0.3 INJECTION, SOLUTION, CONCENTRATE INTRAVENOUS EVERY 5 MIN PRN
Status: CANCELLED | OUTPATIENT
Start: 2024-10-28

## 2024-10-28 RX ORDER — ALBUTEROL SULFATE 90 UG/1
1-2 INHALANT RESPIRATORY (INHALATION)
Status: CANCELLED
Start: 2024-10-28

## 2024-10-28 RX ORDER — MEPERIDINE HYDROCHLORIDE 25 MG/ML
25 INJECTION INTRAMUSCULAR; INTRAVENOUS; SUBCUTANEOUS
Status: CANCELLED | OUTPATIENT
Start: 2024-10-28

## 2024-10-28 RX ORDER — DIPHENHYDRAMINE HYDROCHLORIDE 50 MG/ML
50 INJECTION INTRAMUSCULAR; INTRAVENOUS
Status: CANCELLED
Start: 2024-10-28

## 2024-10-30 ENCOUNTER — LAB REQUISITION (OUTPATIENT)
Dept: LAB | Facility: HOSPITAL | Age: 69
End: 2024-10-30
Payer: COMMERCIAL

## 2024-10-30 DIAGNOSIS — T84.51XA INFECTION AND INFLAMMATORY REACTION DUE TO INTERNAL RIGHT HIP PROSTHESIS, INITIAL ENCOUNTER (H): ICD-10-CM

## 2024-10-30 LAB
ALP SERPL-CCNC: 139 U/L (ref 40–150)
ALT SERPL W P-5'-P-CCNC: <5 U/L (ref 0–50)
BASOPHILS # BLD AUTO: 0 10E3/UL (ref 0–0.2)
BASOPHILS NFR BLD AUTO: 1 %
CREAT SERPL-MCNC: 0.54 MG/DL (ref 0.51–0.95)
EGFRCR SERPLBLD CKD-EPI 2021: >90 ML/MIN/1.73M2
EOSINOPHIL # BLD AUTO: 0.1 10E3/UL (ref 0–0.7)
EOSINOPHIL NFR BLD AUTO: 3 %
ERYTHROCYTE [DISTWIDTH] IN BLOOD BY AUTOMATED COUNT: 14.6 % (ref 10–15)
HCT VFR BLD AUTO: 30.6 % (ref 35–47)
HGB BLD-MCNC: 9.4 G/DL (ref 11.7–15.7)
IMM GRANULOCYTES # BLD: 0 10E3/UL
IMM GRANULOCYTES NFR BLD: 0 %
LYMPHOCYTES # BLD AUTO: 1 10E3/UL (ref 0.8–5.3)
LYMPHOCYTES NFR BLD AUTO: 31 %
MCH RBC QN AUTO: 26.8 PG (ref 26.5–33)
MCHC RBC AUTO-ENTMCNC: 30.7 G/DL (ref 31.5–36.5)
MCV RBC AUTO: 87 FL (ref 78–100)
MONOCYTES # BLD AUTO: 0.5 10E3/UL (ref 0–1.3)
MONOCYTES NFR BLD AUTO: 14 %
NEUTROPHILS # BLD AUTO: 1.7 10E3/UL (ref 1.6–8.3)
NEUTROPHILS NFR BLD AUTO: 51 %
NRBC # BLD AUTO: 0 10E3/UL
NRBC BLD AUTO-RTO: 0 /100
PLATELET # BLD AUTO: 264 10E3/UL (ref 150–450)
RBC # BLD AUTO: 3.51 10E6/UL (ref 3.8–5.2)
WBC # BLD AUTO: 3.3 10E3/UL (ref 4–11)

## 2024-10-30 PROCEDURE — 85025 COMPLETE CBC W/AUTO DIFF WBC: CPT | Mod: ORL | Performed by: ORTHOPAEDIC SURGERY

## 2024-10-30 PROCEDURE — 82565 ASSAY OF CREATININE: CPT | Mod: ORL | Performed by: ORTHOPAEDIC SURGERY

## 2024-10-30 PROCEDURE — 84460 ALANINE AMINO (ALT) (SGPT): CPT | Mod: ORL | Performed by: ORTHOPAEDIC SURGERY

## 2024-10-30 PROCEDURE — 84075 ASSAY ALKALINE PHOSPHATASE: CPT | Mod: ORL | Performed by: ORTHOPAEDIC SURGERY

## 2024-10-31 ENCOUNTER — TELEPHONE (OUTPATIENT)
Dept: FAMILY MEDICINE | Facility: CLINIC | Age: 69
End: 2024-10-31

## 2024-10-31 ENCOUNTER — INFUSION THERAPY VISIT (OUTPATIENT)
Dept: INFUSION THERAPY | Facility: HOSPITAL | Age: 69
End: 2024-10-31
Attending: FAMILY MEDICINE
Payer: COMMERCIAL

## 2024-10-31 VITALS
DIASTOLIC BLOOD PRESSURE: 76 MMHG | TEMPERATURE: 98.5 F | OXYGEN SATURATION: 96 % | HEART RATE: 98 BPM | RESPIRATION RATE: 18 BRPM | SYSTOLIC BLOOD PRESSURE: 120 MMHG

## 2024-10-31 DIAGNOSIS — T84.7XXD HARDWARE COMPLICATING WOUND INFECTION, SUBSEQUENT ENCOUNTER: Primary | ICD-10-CM

## 2024-10-31 PROCEDURE — 250N000011 HC RX IP 250 OP 636: Performed by: FAMILY MEDICINE

## 2024-10-31 PROCEDURE — 258N000003 HC RX IP 258 OP 636: Performed by: FAMILY MEDICINE

## 2024-10-31 PROCEDURE — 96365 THER/PROPH/DIAG IV INF INIT: CPT

## 2024-10-31 RX ORDER — MEPERIDINE HYDROCHLORIDE 50 MG/ML
25 INJECTION INTRAMUSCULAR; INTRAVENOUS; SUBCUTANEOUS
Status: DISCONTINUED | OUTPATIENT
Start: 2024-10-31 | End: 2024-10-31 | Stop reason: HOSPADM

## 2024-10-31 RX ORDER — ALBUTEROL SULFATE 90 UG/1
1-2 INHALANT RESPIRATORY (INHALATION)
Status: DISCONTINUED | OUTPATIENT
Start: 2024-10-31 | End: 2024-10-31 | Stop reason: HOSPADM

## 2024-10-31 RX ORDER — HEPARIN SODIUM (PORCINE) LOCK FLUSH IV SOLN 100 UNIT/ML 100 UNIT/ML
5 SOLUTION INTRAVENOUS
OUTPATIENT
Start: 2024-10-31

## 2024-10-31 RX ORDER — HEPARIN SODIUM,PORCINE 10 UNIT/ML
5-20 VIAL (ML) INTRAVENOUS DAILY PRN
OUTPATIENT
Start: 2024-10-31

## 2024-10-31 RX ORDER — DIPHENHYDRAMINE HYDROCHLORIDE 50 MG/ML
25 INJECTION INTRAMUSCULAR; INTRAVENOUS
Start: 2024-10-31

## 2024-10-31 RX ORDER — MEPERIDINE HYDROCHLORIDE 50 MG/ML
25 INJECTION INTRAMUSCULAR; INTRAVENOUS; SUBCUTANEOUS
OUTPATIENT
Start: 2024-10-31

## 2024-10-31 RX ORDER — DIPHENHYDRAMINE HYDROCHLORIDE 50 MG/ML
25 INJECTION INTRAMUSCULAR; INTRAVENOUS
Status: DISCONTINUED | OUTPATIENT
Start: 2024-10-31 | End: 2024-10-31 | Stop reason: HOSPADM

## 2024-10-31 RX ORDER — ALBUTEROL SULFATE 0.83 MG/ML
2.5 SOLUTION RESPIRATORY (INHALATION)
Status: DISCONTINUED | OUTPATIENT
Start: 2024-10-31 | End: 2024-10-31 | Stop reason: HOSPADM

## 2024-10-31 RX ORDER — DIPHENHYDRAMINE HYDROCHLORIDE 50 MG/ML
50 INJECTION INTRAMUSCULAR; INTRAVENOUS
Start: 2024-10-31

## 2024-10-31 RX ORDER — METHYLPREDNISOLONE SODIUM SUCCINATE 40 MG/ML
40 INJECTION INTRAMUSCULAR; INTRAVENOUS
Status: DISCONTINUED | OUTPATIENT
Start: 2024-10-31 | End: 2024-10-31 | Stop reason: HOSPADM

## 2024-10-31 RX ORDER — ALBUTEROL SULFATE 90 UG/1
1-2 INHALANT RESPIRATORY (INHALATION)
Start: 2024-10-31

## 2024-10-31 RX ORDER — METHYLPREDNISOLONE SODIUM SUCCINATE 40 MG/ML
40 INJECTION INTRAMUSCULAR; INTRAVENOUS
Start: 2024-10-31

## 2024-10-31 RX ORDER — EPINEPHRINE 1 MG/ML
0.3 INJECTION, SOLUTION INTRAMUSCULAR; SUBCUTANEOUS EVERY 5 MIN PRN
OUTPATIENT
Start: 2024-10-31

## 2024-10-31 RX ORDER — DIPHENHYDRAMINE HYDROCHLORIDE 50 MG/ML
50 INJECTION INTRAMUSCULAR; INTRAVENOUS
Status: DISCONTINUED | OUTPATIENT
Start: 2024-10-31 | End: 2024-10-31 | Stop reason: HOSPADM

## 2024-10-31 RX ORDER — ALBUTEROL SULFATE 0.83 MG/ML
2.5 SOLUTION RESPIRATORY (INHALATION)
OUTPATIENT
Start: 2024-10-31

## 2024-10-31 RX ORDER — EPINEPHRINE 1 MG/ML
0.3 INJECTION, SOLUTION INTRAMUSCULAR; SUBCUTANEOUS EVERY 5 MIN PRN
Status: DISCONTINUED | OUTPATIENT
Start: 2024-10-31 | End: 2024-10-31 | Stop reason: HOSPADM

## 2024-10-31 RX ADMIN — MICAFUNGIN SODIUM 100 MG: 50 INJECTION, POWDER, LYOPHILIZED, FOR SOLUTION INTRAVENOUS at 14:36

## 2024-10-31 RX ADMIN — SODIUM CHLORIDE 50 ML: 900 INJECTION INTRAVENOUS at 14:32

## 2024-10-31 NOTE — PROGRESS NOTES
Infusion Nursing Note:  Nasreen Galarza presents today for Micafungin.    Patient seen by provider today: No   present during visit today: Not Applicable.    Note: Nasreen arrived via w/c accompanied by her son for micafungin. Plan of care reviewed and they have no additional questions. Nasreen will be receiving additional doses through home infusion..    Intravenous Access:  PICC. Patient is receiving services from home infusion. PICC dressing change was completed yesterday. Noted redness around securement device only. PICC insertion site without redness, swelling or drainage. Nasreen will contact home infusion for further evaluation and enquire about alternate securement devices.    Treatment Conditions:  Not Applicable.    Post Infusion Assessment:  Patient tolerated infusion without incident.  Blood return noted pre and post infusion.  Site patent and intact, free from redness, edema or discomfort.  PICC line flushed with normal saline following completion of micafungin. Curos cap applied.     Discharge Plan:    Patient discharged in stable condition accompanied by: son.  Departure Mode: Wheelchair.      Corinne Hamilton RN

## 2024-10-31 NOTE — TELEPHONE ENCOUNTER
FS,  FYI.    Verbal ok given for skilled nursing twice a week for two weeks and once a week for one week for labs and picc changes.    You signed homecare orders on 10/11 but RN from [facility] states there was computer issue.    No action needed at this time.    Nicole MOLINA RN

## 2024-11-06 ENCOUNTER — LAB REQUISITION (OUTPATIENT)
Dept: LAB | Facility: HOSPITAL | Age: 69
End: 2024-11-06
Payer: COMMERCIAL

## 2024-11-06 ENCOUNTER — TRANSFERRED RECORDS (OUTPATIENT)
Dept: HEALTH INFORMATION MANAGEMENT | Facility: CLINIC | Age: 69
End: 2024-11-06

## 2024-11-06 ENCOUNTER — TELEPHONE (OUTPATIENT)
Dept: FAMILY MEDICINE | Facility: CLINIC | Age: 69
End: 2024-11-06

## 2024-11-06 DIAGNOSIS — Z79.2 LONG TERM (CURRENT) USE OF ANTIBIOTICS: ICD-10-CM

## 2024-11-06 LAB — ALT SERPL W P-5'-P-CCNC: <5 U/L (ref 0–50)

## 2024-11-06 PROCEDURE — 84460 ALANINE AMINO (ALT) (SGPT): CPT | Mod: ORL | Performed by: PHYSICIAN ASSISTANT

## 2024-11-06 NOTE — TELEPHONE ENCOUNTER
FS,  Please see below recertification orders request.      Home Care is calling regarding an established patient with  [facility].    Requesting orders from: Noemi Mayer  Provider is following patient: Yes  Is this a 60-day recertification request?  Yes    Orders Requested    Skilled Nursing  Request for recertification   Frequency:  continue with skilled nursing once a week for four weeks starting 11/11    This is for picc line dressing changes, labs, and skin and general wellness plus two PRN for status changes.    Confirmed ok to leave a detailed message with call back.  Contact information confirmed and updated as needed.    Can reach Dorie's confidential voicemail at [phone]    Nicole Barakat RN

## 2024-11-07 ENCOUNTER — TELEPHONE (OUTPATIENT)
Dept: FAMILY MEDICINE | Facility: CLINIC | Age: 69
End: 2024-11-07
Payer: COMMERCIAL

## 2024-11-07 NOTE — TELEPHONE ENCOUNTER
Forms/Letter Request    Type of form/letter: OTHER: a change to treatment and or medication resumption of care 10/06/2024 and 10/13/204 1 x week x4 weeks        Do we have the form/letter: Yes:  order    Who is the form from? Home care    Where did/will the form come from? form was faxed in    When is form/letter needed by: asap    How would you like the form/letter returned: Fax : [phone]    Patient Notified form requests are processed in 5-7 business days:Yes    Could we send this information to you in [employer] or would you prefer to receive a phone call?:   No preference   Okay to leave a detailed message?: Yes at Other phone number:  [phone]

## 2024-11-11 ENCOUNTER — MEDICAL CORRESPONDENCE (OUTPATIENT)
Dept: HEALTH INFORMATION MANAGEMENT | Facility: CLINIC | Age: 69
End: 2024-11-11
Payer: COMMERCIAL

## 2024-11-13 ENCOUNTER — LAB REQUISITION (OUTPATIENT)
Dept: LAB | Facility: HOSPITAL | Age: 69
End: 2024-11-13
Payer: COMMERCIAL

## 2024-11-13 DIAGNOSIS — T84.51XA INFECTION AND INFLAMMATORY REACTION DUE TO INTERNAL RIGHT HIP PROSTHESIS, INITIAL ENCOUNTER (H): ICD-10-CM

## 2024-11-13 LAB — ALT SERPL W P-5'-P-CCNC: 6 U/L (ref 0–50)

## 2024-11-13 PROCEDURE — 84460 ALANINE AMINO (ALT) (SGPT): CPT | Mod: ORL | Performed by: ORTHOPAEDIC SURGERY

## 2024-11-14 ENCOUNTER — TELEPHONE (OUTPATIENT)
Dept: FAMILY MEDICINE | Facility: CLINIC | Age: 69
End: 2024-11-14
Payer: COMMERCIAL

## 2024-11-14 NOTE — TELEPHONE ENCOUNTER
Forms/Letter Request    Type of form/letter: Home Health Certification      Do we have the form/letter: Yes:    Who is the form from? Home care    Where did/will the form come from? form was faxed in    When is form/letter needed by:     How would you like the form/letter returned: Fax : [phone]    Patient Notified form requests are processed in 5-7 business days:No    Could we send this information to you in [employer] or would you prefer to receive a phone call?:   No preference   Okay to leave a detailed message?: No at Home number on file [phone] ([place])

## 2024-11-15 DIAGNOSIS — Z53.9 DIAGNOSIS NOT YET DEFINED: Primary | ICD-10-CM

## 2024-11-15 PROCEDURE — G0179 MD RECERTIFICATION HHA PT: HCPCS | Performed by: FAMILY MEDICINE

## 2024-11-15 NOTE — TELEPHONE ENCOUNTER
Signed, faxed and sent for scanning  [facility] Unit Coordinator     Laryngeal candidiasis

## 2024-11-19 ENCOUNTER — LAB REQUISITION (OUTPATIENT)
Dept: LAB | Facility: HOSPITAL | Age: 69
End: 2024-11-19
Payer: COMMERCIAL

## 2024-11-19 DIAGNOSIS — T84.51XA INFECTION AND INFLAMMATORY REACTION DUE TO INTERNAL RIGHT HIP PROSTHESIS, INITIAL ENCOUNTER (H): ICD-10-CM

## 2024-11-19 LAB — ALT SERPL W P-5'-P-CCNC: 7 U/L (ref 0–50)

## 2024-11-19 PROCEDURE — 84460 ALANINE AMINO (ALT) (SGPT): CPT | Mod: ORL | Performed by: ORTHOPAEDIC SURGERY

## 2024-11-25 ENCOUNTER — LAB REQUISITION (OUTPATIENT)
Dept: LAB | Facility: HOSPITAL | Age: 69
End: 2024-11-25
Payer: COMMERCIAL

## 2024-11-25 DIAGNOSIS — T84.51XA INFECTION AND INFLAMMATORY REACTION DUE TO INTERNAL RIGHT HIP PROSTHESIS, INITIAL ENCOUNTER (H): ICD-10-CM

## 2024-11-25 LAB — ALT SERPL W P-5'-P-CCNC: 13 U/L (ref 0–50)

## 2024-12-02 ENCOUNTER — LAB REQUISITION (OUTPATIENT)
Dept: LAB | Facility: HOSPITAL | Age: 69
End: 2024-12-02
Payer: COMMERCIAL

## 2024-12-02 ENCOUNTER — TELEPHONE (OUTPATIENT)
Dept: FAMILY MEDICINE | Facility: CLINIC | Age: 69
End: 2024-12-02

## 2024-12-02 DIAGNOSIS — Z79.2 LONG TERM (CURRENT) USE OF ANTIBIOTICS: ICD-10-CM

## 2024-12-02 LAB — ALT SERPL W P-5'-P-CCNC: 13 U/L (ref 0–50)

## 2024-12-02 PROCEDURE — 84460 ALANINE AMINO (ALT) (SGPT): CPT | Mod: ORL | Performed by: PHYSICIAN ASSISTANT

## 2024-12-02 NOTE — TELEPHONE ENCOUNTER
[facility] calling for verbal orders     Extend skill nursing for 1x/wk for 5 wks  For picc line dressing changes, labs, and general wellness      Tamara --[phone] (confidential vm)     Thanks   Marguerite LATIF RN  [facility]

## 2024-12-04 ENCOUNTER — MEDICAL CORRESPONDENCE (OUTPATIENT)
Dept: HEALTH INFORMATION MANAGEMENT | Facility: CLINIC | Age: 69
End: 2024-12-04
Payer: COMMERCIAL

## 2024-12-04 NOTE — TELEPHONE ENCOUNTER
LVM with Tamara ENRIQUE  Relayed okay for verbal orders given by MD (see below)    Jen PIERCE RN

## 2024-12-09 ENCOUNTER — LAB REQUISITION (OUTPATIENT)
Dept: LAB | Facility: HOSPITAL | Age: 69
End: 2024-12-09
Payer: COMMERCIAL

## 2024-12-09 DIAGNOSIS — T84.51XA INFECTION AND INFLAMMATORY REACTION DUE TO INTERNAL RIGHT HIP PROSTHESIS, INITIAL ENCOUNTER (H): ICD-10-CM

## 2024-12-09 LAB — ALT SERPL W P-5'-P-CCNC: 12 U/L (ref 0–50)

## 2024-12-09 PROCEDURE — 84460 ALANINE AMINO (ALT) (SGPT): CPT | Mod: ORL | Performed by: ORTHOPAEDIC SURGERY

## 2024-12-11 ENCOUNTER — MEDICAL CORRESPONDENCE (OUTPATIENT)
Dept: HEALTH INFORMATION MANAGEMENT | Facility: CLINIC | Age: 69
End: 2024-12-11
Payer: COMMERCIAL

## 2024-12-16 ENCOUNTER — LAB REQUISITION (OUTPATIENT)
Dept: LAB | Facility: HOSPITAL | Age: 69
End: 2024-12-16
Payer: COMMERCIAL

## 2024-12-16 DIAGNOSIS — T84.51XA INFECTION AND INFLAMMATORY REACTION DUE TO INTERNAL RIGHT HIP PROSTHESIS, INITIAL ENCOUNTER (H): ICD-10-CM

## 2024-12-16 LAB — ALT SERPL W P-5'-P-CCNC: 14 U/L (ref 0–50)

## 2024-12-16 PROCEDURE — 84460 ALANINE AMINO (ALT) (SGPT): CPT | Mod: ORL | Performed by: ORTHOPAEDIC SURGERY

## 2024-12-23 ENCOUNTER — LAB REQUISITION (OUTPATIENT)
Dept: LAB | Facility: HOSPITAL | Age: 69
End: 2024-12-23
Payer: COMMERCIAL

## 2024-12-23 DIAGNOSIS — B95.8 UNSPECIFIED STAPHYLOCOCCUS AS THE CAUSE OF DISEASES CLASSIFIED ELSEWHERE: ICD-10-CM

## 2024-12-23 DIAGNOSIS — T84.51XA INFECTION AND INFLAMMATORY REACTION DUE TO INTERNAL RIGHT HIP PROSTHESIS, INITIAL ENCOUNTER (H): ICD-10-CM

## 2024-12-23 LAB — ALT SERPL W P-5'-P-CCNC: 20 U/L (ref 0–50)

## 2024-12-23 PROCEDURE — 84460 ALANINE AMINO (ALT) (SGPT): CPT | Mod: ORL | Performed by: FAMILY MEDICINE

## 2024-12-30 ENCOUNTER — LAB REQUISITION (OUTPATIENT)
Dept: LAB | Facility: HOSPITAL | Age: 69
End: 2024-12-30
Payer: COMMERCIAL

## 2024-12-30 DIAGNOSIS — T84.51XA INFECTION AND INFLAMMATORY REACTION DUE TO INTERNAL RIGHT HIP PROSTHESIS, INITIAL ENCOUNTER: ICD-10-CM

## 2024-12-30 LAB — ALT SERPL W P-5'-P-CCNC: 18 U/L (ref 0–50)

## 2024-12-30 PROCEDURE — 84460 ALANINE AMINO (ALT) (SGPT): CPT | Mod: ORL | Performed by: ORTHOPAEDIC SURGERY

## 2024-12-31 DIAGNOSIS — N95.1 MENOPAUSAL SYNDROME (HOT FLASHES): ICD-10-CM

## 2025-01-02 RX ORDER — PAROXETINE 20 MG/1
20 TABLET, FILM COATED ORAL EVERY MORNING
Qty: 90 TABLET | Refills: 3 | Status: SHIPPED | OUTPATIENT
Start: 2025-01-02

## 2025-01-10 ENCOUNTER — HOSPITAL ENCOUNTER (OUTPATIENT)
Dept: MAMMOGRAPHY | Facility: CLINIC | Age: 70
Discharge: HOME | End: 2025-01-10
Attending: FAMILY MEDICINE | Admitting: FAMILY MEDICINE
Payer: COMMERCIAL

## 2025-01-10 DIAGNOSIS — Z12.31 VISIT FOR SCREENING MAMMOGRAM: ICD-10-CM

## 2025-01-10 PROCEDURE — 77063 BREAST TOMOSYNTHESIS BI: CPT

## 2025-01-12 ENCOUNTER — HEALTH MAINTENANCE LETTER (OUTPATIENT)
Age: 70
End: 2025-01-12

## 2025-01-17 PROBLEM — E66.811 CLASS 1 OBESITY WITH SERIOUS COMORBIDITY AND BODY MASS INDEX (BMI) OF 32.0 TO 32.9 IN ADULT, UNSPECIFIED OBESITY TYPE: Status: ACTIVE | Noted: 2022-03-30

## 2025-03-09 ENCOUNTER — MYC REFILL (OUTPATIENT)
Dept: FAMILY MEDICINE | Facility: CLINIC | Age: 70
End: 2025-03-09
Payer: COMMERCIAL

## 2025-03-09 DIAGNOSIS — R39.15 URINARY URGENCY: ICD-10-CM

## 2025-03-10 RX ORDER — FESOTERODINE FUMARATE 8 MG/1
8 TABLET, FILM COATED, EXTENDED RELEASE ORAL DAILY
Qty: 90 TABLET | Refills: 1 | Status: SHIPPED | OUTPATIENT
Start: 2025-03-10

## 2025-05-17 ENCOUNTER — HEALTH MAINTENANCE LETTER (OUTPATIENT)
Age: 70
End: 2025-05-17

## 2025-06-05 DIAGNOSIS — Z79.2 ENCOUNTER FOR LONG-TERM (CURRENT) USE OF ANTIBIOTICS: ICD-10-CM

## 2025-06-05 DIAGNOSIS — T84.51XA INFECTION OF RIGHT PROSTHETIC HIP JOINT: Primary | ICD-10-CM

## 2025-07-16 ENCOUNTER — MYC MEDICAL ADVICE (OUTPATIENT)
Dept: FAMILY MEDICINE | Facility: CLINIC | Age: 70
End: 2025-07-16
Payer: COMMERCIAL

## 2025-07-16 DIAGNOSIS — R41.3 DECLINE IN VERBAL MEMORY: Primary | ICD-10-CM

## 2025-07-16 PROCEDURE — 99212 OFFICE O/P EST SF 10 MIN: CPT | Performed by: FAMILY MEDICINE

## 2025-07-16 NOTE — TELEPHONE ENCOUNTER
I would recommend a virtual visit. This will allow me to gather important information and place correct referral and testing.     MD Leyla

## 2025-07-16 NOTE — TELEPHONE ENCOUNTER
Dr. Mayer,    Please see below [employer] message and advise.   Neuro Referral pended, if desired.      Thanks,   Jen PIERCE RN

## 2025-07-17 ENCOUNTER — VIRTUAL VISIT (OUTPATIENT)
Dept: FAMILY MEDICINE | Facility: CLINIC | Age: 70
End: 2025-07-17
Payer: COMMERCIAL

## 2025-07-17 DIAGNOSIS — G31.84 MCI (MILD COGNITIVE IMPAIRMENT): ICD-10-CM

## 2025-07-17 DIAGNOSIS — F43.23 ADJUSTMENT DISORDER WITH MIXED ANXIETY AND DEPRESSED MOOD: ICD-10-CM

## 2025-07-17 DIAGNOSIS — R41.3 DECLINE IN VERBAL MEMORY: Primary | ICD-10-CM

## 2025-07-17 NOTE — PROGRESS NOTES
"Nasreen is a 69 year old who is being evaluated via a billable video visit.    How would you like to obtain your AVS? MyChart  If the video visit is dropped, the invitation should be resent by: Text to cell phone: [phone]  Will anyone else be joining your video visit? No    Assessment & Plan     Decline in verbal memory  MCI (mild cognitive impairment)  I reviewed recent labs completed at [facility].  Patient CBC shows mild leukocytosis with leukocyte count of 10.3 mostly neutrophils. Basic metabolic panel revealed a mild decrease in her GFR with mild increase in creatinine.  This is the first decline in her GFR below 60 and it is likely transient. Patient had a normal renal function recently.    - Vitamin B12; Future  - LYME DISEASE TOTAL ANTIBODIES WITH REFLEX TO CONFIRMATION; Future  - TSH with free T4 reflex; Future  - Adult Neurology  Referral; Future  - Adult Neuropsychology  Referral; Future    Adjustment disorder with mixed anxiety and depressed mood  - Adult Mental Health  Referral; Future    BMI  Estimated body mass index is 32.38 kg/m  as calculated from the following:    Height as of 1/17/25: 1.654 m (5' 5.1\").    Weight as of 1/17/25: 88.5 kg (195 lb 3.2 oz).     Follow-up       Subjective   Nasreen is a 69 year old, presenting for the following health issues:  Memory Loss        7/17/2025     8:05 AM   Additional Questions   Roomed by Chele GOODWIN     Video Start Time: 10:03 AM    HPI    Pleasant 69-year-old with a history of primary osteoarthritis of the right hip status post right hip arthroplasty on June 24, 2024.  Postsurgical complications included periprosthetic femoral fracture status post right total hip joint revision and ORIF on July 3, 2024, wound dehiscence of surgical site infection and bacteremia.  Patient had a difficult year and lost her job in March 2025.  Patient scheduled a virtual visit to discuss memory changes.  Initial symptoms were noted more than a year " "ago while she was working.  Patient had to write down information in order to remember it later.  She had a recent incident where she forgot the name of favorite author\" Beverly Hutchinson\". Episodes of forgetfulness were happening often but this episode was bothersome. She was previously able to recall names after a few minutes but for this episode she couldn't remember despite giving herself enough time.  Patient also recalls another episode where she forgot specific directions to her friend's house.  Mood/behavior: Notices recent increase in depression.  Isolating herself from friends.  Denies changes in personality, delusions/paranoia or hallucinations)  Sleep: denies any previous diagnoses of sleep apnea.   Background medical problems: recent extensive hospitalization as mentioned in HPI.  ADLs: Patient continues to perform all of her ADLs    FHx:  - Patient is adopted.     SHx:  Head trauma history: Patient had a fungal infection and was given oral fluconazole and had a QT prolongation and had a syncopal episode in the bathroom and hit her head on the wall.   Alcohol, marijuana or CBD, recreational drugs- Denies     Objective Testing:  Checking B12 levels and thyroid functioning  If already taken, check other labs like metabolic panel and CBC  Family hx:  - MOCA test: Unable to complete MoCA testing today. patient had a virtual visit-    {SUPERLIST (Optional):202780}  {additonal problems for provider to add (Optional):277808}    {ROS Picklists (Optional):580891}      Objective    Vitals - Patient Reported  Pain Score: No Pain (0)      Vitals:  No vitals were obtained today due to virtual visit.    Physical Exam   GENERAL: alert and no distress  EYES: Eyes grossly normal to inspection.  No discharge or erythema, or obvious scleral/conjunctival abnormalities.  RESP: No audible wheeze, cough, or visible cyanosis.    SKIN: Visible skin clear. No significant rash, abnormal pigmentation or lesions.  NEURO: Cranial " "nerves grossly intact.  Mentation and speech appropriate for age.  PSYCH: Appropriate affect, tone, and pace of words    Lab on 06/06/2025   Component Date Value Ref Range Status    Erythrocyte Sedimentation Rate 06/06/2025 28  0 - 30 mm/hr Final    CRP Inflammation 06/06/2025 4.00  <5.00 mg/L Final    WBC Count 06/06/2025 5.7  4.0 - 11.0 10e3/uL Final    RBC Count 06/06/2025 4.27  3.80 - 5.20 10e6/uL Final    Hemoglobin 06/06/2025 11.9  11.7 - 15.7 g/dL Final    Hematocrit 06/06/2025 36.9  35.0 - 47.0 % Final    MCV 06/06/2025 86  78 - 100 fL Final    MCH 06/06/2025 27.9  26.5 - 33.0 pg Final    MCHC 06/06/2025 32.2  31.5 - 36.5 g/dL Final    RDW 06/06/2025 13.1  10.0 - 15.0 % Final    Platelet Count 06/06/2025 294  150 - 450 10e3/uL Final    % Neutrophils 06/06/2025 63  % Final    % Lymphocytes 06/06/2025 24  % Final    % Monocytes 06/06/2025 9  % Final    % Eosinophils 06/06/2025 4  % Final    % Basophils 06/06/2025 0  % Final    % Immature Granulocytes 06/06/2025 0  % Final    Absolute Neutrophils 06/06/2025 3.6  1.6 - 8.3 10e3/uL Final    Absolute Lymphocytes 06/06/2025 1.4  0.8 - 5.3 10e3/uL Final    Absolute Monocytes 06/06/2025 0.5  0.0 - 1.3 10e3/uL Final    Absolute Eosinophils 06/06/2025 0.2  0.0 - 0.7 10e3/uL Final    Absolute Basophils 06/06/2025 0.0  0.0 - 0.2 10e3/uL Final    Absolute Immature Granulocytes 06/06/2025 0.0  <=0.4 10e3/uL Final         Video-Visit Details    Type of service:  Video Visit   Video End Time: 10:41 AM  Originating Location (pt. Location): Home  Distant Location (provider location):  {virtual location provider:520070}  Platform used for Video Visit: {Virtual Visit Platforms:523678::\"Hugo\"}  Signed Electronically by: Noemi aMyer MD    "

## 2025-07-21 ENCOUNTER — PATIENT OUTREACH (OUTPATIENT)
Dept: CARE COORDINATION | Facility: CLINIC | Age: 70
End: 2025-07-21
Payer: COMMERCIAL

## 2025-08-01 PROBLEM — F43.23 ADJUSTMENT DISORDER WITH MIXED ANXIETY AND DEPRESSED MOOD: Status: ACTIVE | Noted: 2025-08-01

## 2025-08-04 ENCOUNTER — PATIENT OUTREACH (OUTPATIENT)
Dept: CARE COORDINATION | Facility: CLINIC | Age: 70
End: 2025-08-04
Payer: COMMERCIAL

## 2025-08-06 ENCOUNTER — PATIENT OUTREACH (OUTPATIENT)
Dept: CARE COORDINATION | Facility: CLINIC | Age: 70
End: 2025-08-06
Payer: COMMERCIAL

## 2025-09-01 ENCOUNTER — MYC REFILL (OUTPATIENT)
Dept: FAMILY MEDICINE | Facility: CLINIC | Age: 70
End: 2025-09-01
Payer: COMMERCIAL

## 2025-09-01 RX ORDER — TRETINOIN 0.5 MG/G
CREAM TOPICAL AT BEDTIME
Status: CANCELLED | OUTPATIENT
Start: 2025-09-01

## 2025-09-02 ENCOUNTER — MYC REFILL (OUTPATIENT)
Dept: FAMILY MEDICINE | Facility: CLINIC | Age: 70
End: 2025-09-02
Payer: COMMERCIAL

## 2025-09-02 DIAGNOSIS — E78.5 HYPERLIPIDEMIA LDL GOAL <100: ICD-10-CM

## 2025-09-03 RX ORDER — ATORVASTATIN CALCIUM 80 MG/1
80 TABLET, FILM COATED ORAL DAILY
Qty: 100 TABLET | Refills: 2 | OUTPATIENT
Start: 2025-09-03

## 2025-09-03 RX ORDER — ATORVASTATIN CALCIUM 80 MG/1
80 TABLET, FILM COATED ORAL DAILY
Qty: 90 TABLET | Refills: 1 | Status: SHIPPED | OUTPATIENT
Start: 2025-09-03

## (undated) DEVICE — EYE KNIFE SLIT XSTAR VISITEC 2.4MM 45DEG BEVEL UP 373724

## (undated) DEVICE — EYE TIP IRRIGATION & ASPIRATION POLYMER 35D BENT 8065751511

## (undated) DEVICE — EYE PACK CUSTOM ANTERIOR 30DEG TIP CENTURION PPK6682-04

## (undated) DEVICE — EYE KNIFE STILETTO VISITEC 1.1MM ANG 45DEG SIDEPORT 376620

## (undated) DEVICE — GLOVE PROTEXIS MICRO 7.5  2D73PM75

## (undated) DEVICE — EYE CANN IRR 25GA CYSTOTOME 581610

## (undated) DEVICE — SOL WATER IRRIG 500ML BOTTLE 2F7113

## (undated) DEVICE — EYE SHIELD PLASTIC

## (undated) DEVICE — EYE CANN IRR 27GA ANTERIOR CHAMBER 581280

## (undated) DEVICE — PACK CATARACT CUSTOM ASC SEY15CPUMC

## (undated) DEVICE — LINEN TOWEL PACK X5 5464

## (undated) RX ORDER — ACETAMINOPHEN 325 MG/1
TABLET ORAL
Status: DISPENSED
Start: 2022-10-20

## (undated) RX ORDER — FENTANYL CITRATE 50 UG/ML
INJECTION, SOLUTION INTRAMUSCULAR; INTRAVENOUS
Status: DISPENSED
Start: 2022-10-20

## (undated) RX ORDER — FENTANYL CITRATE 50 UG/ML
INJECTION, SOLUTION INTRAMUSCULAR; INTRAVENOUS
Status: DISPENSED
Start: 2022-12-29

## (undated) RX ORDER — FENTANYL CITRATE 50 UG/ML
INJECTION, SOLUTION INTRAMUSCULAR; INTRAVENOUS
Status: DISPENSED
Start: 2022-10-06

## (undated) RX ORDER — ACETAMINOPHEN 325 MG/1
TABLET ORAL
Status: DISPENSED
Start: 2022-10-06